# Patient Record
Sex: FEMALE | Race: WHITE | NOT HISPANIC OR LATINO | Employment: FULL TIME | URBAN - NONMETROPOLITAN AREA
[De-identification: names, ages, dates, MRNs, and addresses within clinical notes are randomized per-mention and may not be internally consistent; named-entity substitution may affect disease eponyms.]

---

## 2017-01-06 RX ORDER — WARFARIN SODIUM 2.5 MG/1
2.5 TABLET ORAL DAILY
Qty: 90 TABLET | Refills: 1 | Status: SHIPPED | OUTPATIENT
Start: 2017-01-06 | End: 2017-06-01 | Stop reason: SDUPTHER

## 2017-01-06 RX ORDER — WARFARIN SODIUM 5 MG/1
5 TABLET ORAL
Qty: 90 TABLET | Refills: 1 | Status: SHIPPED | OUTPATIENT
Start: 2017-01-06 | End: 2017-04-18 | Stop reason: SDUPTHER

## 2017-01-09 ENCOUNTER — LAB (OUTPATIENT)
Dept: LAB | Facility: HOSPITAL | Age: 32
End: 2017-01-09

## 2017-01-09 ENCOUNTER — ANTICOAGULATION VISIT (OUTPATIENT)
Dept: CARDIOLOGY | Facility: CLINIC | Age: 32
End: 2017-01-09

## 2017-01-09 ENCOUNTER — TRANSCRIBE ORDERS (OUTPATIENT)
Dept: ADMINISTRATIVE | Facility: HOSPITAL | Age: 32
End: 2017-01-09

## 2017-01-09 DIAGNOSIS — Z79.01 LONG TERM (CURRENT) USE OF ANTICOAGULANTS: Primary | ICD-10-CM

## 2017-01-09 DIAGNOSIS — Z79.01 LONG TERM (CURRENT) USE OF ANTICOAGULANTS: ICD-10-CM

## 2017-01-09 LAB
INR PPP: 1.32 (ref 0.8–1.1)
PROTHROMBIN TIME: 15 SECONDS (ref 9.8–11.9)

## 2017-01-09 PROCEDURE — 85610 PROTHROMBIN TIME: CPT | Performed by: INTERNAL MEDICINE

## 2017-01-09 PROCEDURE — 36415 COLL VENOUS BLD VENIPUNCTURE: CPT

## 2017-01-09 NOTE — PATIENT INSTRUCTIONS
Spoke with pt, she has been eating more vegetables including broccoli and asparagus. She will take a one time dose of 12.5 mg tonight only then resume usual dose. Recheck in 1 week. ALVA

## 2017-01-09 NOTE — PROGRESS NOTES
I have reviewed the anticoagulation track calender, labs, and dosage adjustments made by Chantale Conde and I agree.    Electronically signed by ENMANUEL Finley 01/09/17 6:06 PM

## 2017-01-16 ENCOUNTER — LAB (OUTPATIENT)
Dept: LAB | Facility: HOSPITAL | Age: 32
End: 2017-01-16

## 2017-01-16 ENCOUNTER — TRANSCRIBE ORDERS (OUTPATIENT)
Dept: ADMINISTRATIVE | Facility: HOSPITAL | Age: 32
End: 2017-01-16

## 2017-01-16 ENCOUNTER — ANTICOAGULATION VISIT (OUTPATIENT)
Dept: CARDIOLOGY | Facility: CLINIC | Age: 32
End: 2017-01-16

## 2017-01-16 DIAGNOSIS — Z79.01 LONG TERM (CURRENT) USE OF ANTICOAGULANTS: ICD-10-CM

## 2017-01-16 DIAGNOSIS — Z79.01 LONG TERM (CURRENT) USE OF ANTICOAGULANTS: Primary | ICD-10-CM

## 2017-01-16 LAB
INR PPP: 2.18
INR PPP: 2.18 (ref 0.8–1.1)
PROTHROMBIN TIME: 24.8 SECONDS (ref 9.8–11.9)

## 2017-01-16 PROCEDURE — 36415 COLL VENOUS BLD VENIPUNCTURE: CPT

## 2017-01-16 PROCEDURE — 85610 PROTHROMBIN TIME: CPT | Performed by: INTERNAL MEDICINE

## 2017-01-16 NOTE — PROGRESS NOTES
I have reviewed the anticoagulation track calender, labs, and dosage adjustments made by Amber Hess and I agree.    Electronically signed by ENMANUEL Finley 01/16/17 4:15 PM

## 2017-01-16 NOTE — PATIENT INSTRUCTIONS
Verified dose with PT- last week she had eaten a lot of green vegetables- instructed in new dose- recheck 1 week- LC

## 2017-01-24 ENCOUNTER — TRANSCRIBE ORDERS (OUTPATIENT)
Dept: ADMINISTRATIVE | Facility: HOSPITAL | Age: 32
End: 2017-01-24

## 2017-01-24 ENCOUNTER — LAB (OUTPATIENT)
Dept: LAB | Facility: HOSPITAL | Age: 32
End: 2017-01-24

## 2017-01-24 ENCOUNTER — ANTICOAGULATION VISIT (OUTPATIENT)
Dept: CARDIOLOGY | Facility: CLINIC | Age: 32
End: 2017-01-24

## 2017-01-24 DIAGNOSIS — Z79.01 LONG TERM (CURRENT) USE OF ANTICOAGULANTS: ICD-10-CM

## 2017-01-24 DIAGNOSIS — Z79.01 LONG TERM (CURRENT) USE OF ANTICOAGULANTS: Primary | ICD-10-CM

## 2017-01-24 LAB
INR PPP: 2.12 (ref 0.8–1.1)
PROTHROMBIN TIME: 24.1 SECONDS (ref 9.8–11.9)

## 2017-01-24 PROCEDURE — 36415 COLL VENOUS BLD VENIPUNCTURE: CPT

## 2017-01-24 PROCEDURE — 85610 PROTHROMBIN TIME: CPT | Performed by: INTERNAL MEDICINE

## 2017-01-24 NOTE — PATIENT INSTRUCTIONS
LVM for pt advising to continue current dose and recheck in 2 weeks.  Phone number left should she have any questions or concerns.

## 2017-01-30 ENCOUNTER — LAB (OUTPATIENT)
Dept: LAB | Facility: HOSPITAL | Age: 32
End: 2017-01-30

## 2017-01-30 ENCOUNTER — ANTICOAGULATION VISIT (OUTPATIENT)
Dept: CARDIOLOGY | Facility: CLINIC | Age: 32
End: 2017-01-30

## 2017-01-30 ENCOUNTER — TRANSCRIBE ORDERS (OUTPATIENT)
Dept: ADMINISTRATIVE | Facility: HOSPITAL | Age: 32
End: 2017-01-30

## 2017-01-30 DIAGNOSIS — Z79.01 LONG TERM (CURRENT) USE OF ANTICOAGULANTS: ICD-10-CM

## 2017-01-30 DIAGNOSIS — Z79.01 LONG TERM (CURRENT) USE OF ANTICOAGULANTS: Primary | ICD-10-CM

## 2017-01-30 LAB
INR PPP: 1.62 (ref 0.8–1.1)
PROTHROMBIN TIME: 18.4 SECONDS (ref 9.8–11.9)

## 2017-01-30 PROCEDURE — 36415 COLL VENOUS BLD VENIPUNCTURE: CPT

## 2017-01-30 PROCEDURE — 85610 PROTHROMBIN TIME: CPT | Performed by: INTERNAL MEDICINE

## 2017-02-06 ENCOUNTER — LAB (OUTPATIENT)
Dept: LAB | Facility: HOSPITAL | Age: 32
End: 2017-02-06

## 2017-02-06 ENCOUNTER — TRANSCRIBE ORDERS (OUTPATIENT)
Dept: CARDIOLOGY | Facility: CLINIC | Age: 32
End: 2017-02-06

## 2017-02-06 DIAGNOSIS — Z79.01 LONG TERM (CURRENT) USE OF ANTICOAGULANTS: Primary | ICD-10-CM

## 2017-02-06 DIAGNOSIS — Z79.01 LONG TERM (CURRENT) USE OF ANTICOAGULANTS: ICD-10-CM

## 2017-02-06 LAB
INR PPP: 1.46 (ref 0.8–1.1)
PROTHROMBIN TIME: 16.6 SECONDS (ref 9.8–11.9)

## 2017-02-06 PROCEDURE — 85610 PROTHROMBIN TIME: CPT | Performed by: INTERNAL MEDICINE

## 2017-02-06 PROCEDURE — 36415 COLL VENOUS BLD VENIPUNCTURE: CPT

## 2017-02-07 ENCOUNTER — ANTICOAGULATION VISIT (OUTPATIENT)
Dept: CARDIOLOGY | Facility: CLINIC | Age: 32
End: 2017-02-07

## 2017-02-07 LAB
INR PPP: 1.46
INR PPP: 1.46

## 2017-02-21 ENCOUNTER — ANTICOAGULATION VISIT (OUTPATIENT)
Dept: CARDIOLOGY | Facility: CLINIC | Age: 32
End: 2017-02-21

## 2017-02-21 ENCOUNTER — TRANSCRIBE ORDERS (OUTPATIENT)
Dept: ADMINISTRATIVE | Facility: HOSPITAL | Age: 32
End: 2017-02-21

## 2017-02-21 ENCOUNTER — LAB (OUTPATIENT)
Dept: LAB | Facility: HOSPITAL | Age: 32
End: 2017-02-21

## 2017-02-21 DIAGNOSIS — Z79.01 LONG TERM (CURRENT) USE OF ANTICOAGULANTS: ICD-10-CM

## 2017-02-21 DIAGNOSIS — Z79.01 LONG TERM (CURRENT) USE OF ANTICOAGULANTS: Primary | ICD-10-CM

## 2017-02-21 LAB
INR PPP: 1.73 (ref 0.8–1.1)
PROTHROMBIN TIME: 19.7 SECONDS (ref 9.8–11.9)

## 2017-02-21 PROCEDURE — 85610 PROTHROMBIN TIME: CPT | Performed by: INTERNAL MEDICINE

## 2017-02-21 PROCEDURE — 36415 COLL VENOUS BLD VENIPUNCTURE: CPT

## 2017-03-06 ENCOUNTER — LAB (OUTPATIENT)
Dept: LAB | Facility: HOSPITAL | Age: 32
End: 2017-03-06

## 2017-03-06 ENCOUNTER — ANTICOAGULATION VISIT (OUTPATIENT)
Dept: CARDIOLOGY | Facility: CLINIC | Age: 32
End: 2017-03-06

## 2017-03-06 ENCOUNTER — TRANSCRIBE ORDERS (OUTPATIENT)
Dept: ADMINISTRATIVE | Facility: HOSPITAL | Age: 32
End: 2017-03-06

## 2017-03-06 DIAGNOSIS — Z79.01 LONG TERM (CURRENT) USE OF ANTICOAGULANTS: Primary | ICD-10-CM

## 2017-03-06 DIAGNOSIS — Z79.01 LONG TERM (CURRENT) USE OF ANTICOAGULANTS: ICD-10-CM

## 2017-03-06 LAB
INR PPP: 1.6
INR PPP: 1.6 (ref 0.8–1.1)
PROTHROMBIN TIME: 18.3 SECONDS (ref 9.8–11.9)

## 2017-03-06 PROCEDURE — 36415 COLL VENOUS BLD VENIPUNCTURE: CPT

## 2017-03-06 PROCEDURE — 85610 PROTHROMBIN TIME: CPT | Performed by: INTERNAL MEDICINE

## 2017-03-10 ENCOUNTER — ANTICOAGULATION VISIT (OUTPATIENT)
Dept: CARDIOLOGY | Facility: CLINIC | Age: 32
End: 2017-03-10

## 2017-03-10 ENCOUNTER — LAB (OUTPATIENT)
Dept: LAB | Facility: HOSPITAL | Age: 32
End: 2017-03-10

## 2017-03-10 ENCOUNTER — TRANSCRIBE ORDERS (OUTPATIENT)
Dept: ADMINISTRATIVE | Facility: HOSPITAL | Age: 32
End: 2017-03-10

## 2017-03-10 DIAGNOSIS — Z79.01 LONG TERM (CURRENT) USE OF ANTICOAGULANTS: Primary | ICD-10-CM

## 2017-03-10 DIAGNOSIS — Z79.01 LONG TERM (CURRENT) USE OF ANTICOAGULANTS: ICD-10-CM

## 2017-03-10 LAB
INR PPP: 1.98 (ref 0.8–1.1)
INR PPP: 3.2
PROTHROMBIN TIME: 22.9 SECONDS (ref 9.8–11.9)

## 2017-03-10 PROCEDURE — 85610 PROTHROMBIN TIME: CPT | Performed by: INTERNAL MEDICINE

## 2017-03-10 PROCEDURE — 36415 COLL VENOUS BLD VENIPUNCTURE: CPT

## 2017-03-10 NOTE — PROGRESS NOTES
I have reviewed the anticoagulation track calender, labs, and dosage adjustments made by Cori Gonsalves RN and I agree.    Electronically signed by ENMANUEL Finley 03/10/17 2:01 PM

## 2017-03-14 ENCOUNTER — LAB (OUTPATIENT)
Dept: LAB | Facility: HOSPITAL | Age: 32
End: 2017-03-14

## 2017-03-14 ENCOUNTER — TRANSCRIBE ORDERS (OUTPATIENT)
Dept: ADMINISTRATIVE | Facility: HOSPITAL | Age: 32
End: 2017-03-14

## 2017-03-14 DIAGNOSIS — Z79.01 LONG TERM (CURRENT) USE OF ANTICOAGULANTS: Primary | ICD-10-CM

## 2017-03-14 DIAGNOSIS — Z79.01 LONG TERM (CURRENT) USE OF ANTICOAGULANTS: ICD-10-CM

## 2017-03-14 LAB
INR PPP: 2.06 (ref 0.8–1.1)
PROTHROMBIN TIME: 23.9 SECONDS (ref 9.8–11.9)

## 2017-03-14 PROCEDURE — 36415 COLL VENOUS BLD VENIPUNCTURE: CPT

## 2017-03-14 PROCEDURE — 85610 PROTHROMBIN TIME: CPT | Performed by: INTERNAL MEDICINE

## 2017-03-15 ENCOUNTER — ANTICOAGULATION VISIT (OUTPATIENT)
Dept: CARDIOLOGY | Facility: CLINIC | Age: 32
End: 2017-03-15

## 2017-03-21 ENCOUNTER — TRANSCRIBE ORDERS (OUTPATIENT)
Dept: ADMINISTRATIVE | Facility: HOSPITAL | Age: 32
End: 2017-03-21

## 2017-03-21 ENCOUNTER — LAB (OUTPATIENT)
Dept: LAB | Facility: HOSPITAL | Age: 32
End: 2017-03-21

## 2017-03-21 ENCOUNTER — ANTICOAGULATION VISIT (OUTPATIENT)
Dept: CARDIOLOGY | Facility: CLINIC | Age: 32
End: 2017-03-21

## 2017-03-21 DIAGNOSIS — Z79.01 LONG TERM (CURRENT) USE OF ANTICOAGULANTS: Primary | ICD-10-CM

## 2017-03-21 DIAGNOSIS — Z79.01 LONG TERM (CURRENT) USE OF ANTICOAGULANTS: ICD-10-CM

## 2017-03-21 LAB
INR PPP: 1.08 (ref 0.8–1.1)
PROTHROMBIN TIME: 12.1 SECONDS (ref 9.8–11.9)

## 2017-03-21 PROCEDURE — 85610 PROTHROMBIN TIME: CPT | Performed by: INTERNAL MEDICINE

## 2017-03-21 PROCEDURE — 36415 COLL VENOUS BLD VENIPUNCTURE: CPT

## 2017-03-21 NOTE — PATIENT INSTRUCTIONS
Patient say that she had not missed any doses or new medicine.  Gave her a new dosage and to recheck 1 week  AH

## 2017-03-24 NOTE — PROGRESS NOTES
I have reviewed the anticoagulation track calender, labs, and dosage adjustments made by Cori Gonsalves RN and I agree.    Electronically signed by ENMANUEL Finley 03/24/17 4:29 PM

## 2017-03-28 ENCOUNTER — LAB (OUTPATIENT)
Dept: LAB | Facility: HOSPITAL | Age: 32
End: 2017-03-28

## 2017-03-28 ENCOUNTER — ANTICOAGULATION VISIT (OUTPATIENT)
Dept: CARDIOLOGY | Facility: CLINIC | Age: 32
End: 2017-03-28

## 2017-03-28 ENCOUNTER — TRANSCRIBE ORDERS (OUTPATIENT)
Dept: ADMINISTRATIVE | Facility: HOSPITAL | Age: 32
End: 2017-03-28

## 2017-03-28 DIAGNOSIS — Z79.01 LONG TERM (CURRENT) USE OF ANTICOAGULANTS: Primary | ICD-10-CM

## 2017-03-28 DIAGNOSIS — Z79.01 LONG TERM (CURRENT) USE OF ANTICOAGULANTS: ICD-10-CM

## 2017-03-28 LAB
INR PPP: 2.15 (ref 0.8–1.1)
PROTHROMBIN TIME: 25 SECONDS (ref 9.8–11.9)

## 2017-03-28 PROCEDURE — 85610 PROTHROMBIN TIME: CPT | Performed by: INTERNAL MEDICINE

## 2017-03-28 PROCEDURE — 36415 COLL VENOUS BLD VENIPUNCTURE: CPT

## 2017-04-05 ENCOUNTER — TRANSCRIBE ORDERS (OUTPATIENT)
Dept: ADMINISTRATIVE | Facility: HOSPITAL | Age: 32
End: 2017-04-05

## 2017-04-05 ENCOUNTER — LAB (OUTPATIENT)
Dept: LAB | Facility: HOSPITAL | Age: 32
End: 2017-04-05

## 2017-04-05 ENCOUNTER — ANTICOAGULATION VISIT (OUTPATIENT)
Dept: CARDIOLOGY | Facility: CLINIC | Age: 32
End: 2017-04-05

## 2017-04-05 DIAGNOSIS — Z79.01 LONG TERM (CURRENT) USE OF ANTICOAGULANTS: Primary | ICD-10-CM

## 2017-04-05 DIAGNOSIS — Z79.01 LONG TERM (CURRENT) USE OF ANTICOAGULANTS: ICD-10-CM

## 2017-04-05 LAB
INR PPP: 1.3 (ref 0.8–1.1)
PROTHROMBIN TIME: 14.7 SECONDS (ref 9.8–11.9)

## 2017-04-05 PROCEDURE — 85610 PROTHROMBIN TIME: CPT | Performed by: INTERNAL MEDICINE

## 2017-04-05 PROCEDURE — 36415 COLL VENOUS BLD VENIPUNCTURE: CPT

## 2017-04-06 NOTE — PROGRESS NOTES
I have reviewed the anticoagulation track calender, labs, and dosage adjustments made by Cori Gonsalves RN and I agree.    Electronically signed by ENMANUEL Finley 04/06/17 12:36 PM

## 2017-04-12 ENCOUNTER — TRANSCRIBE ORDERS (OUTPATIENT)
Dept: ADMINISTRATIVE | Facility: HOSPITAL | Age: 32
End: 2017-04-12

## 2017-04-12 ENCOUNTER — ANTICOAGULATION VISIT (OUTPATIENT)
Dept: CARDIOLOGY | Facility: CLINIC | Age: 32
End: 2017-04-12

## 2017-04-12 ENCOUNTER — LAB (OUTPATIENT)
Dept: LAB | Facility: HOSPITAL | Age: 32
End: 2017-04-12

## 2017-04-12 DIAGNOSIS — Z79.01 LONG TERM (CURRENT) USE OF ANTICOAGULANTS: ICD-10-CM

## 2017-04-12 DIAGNOSIS — Z79.01 LONG TERM (CURRENT) USE OF ANTICOAGULANTS: Primary | ICD-10-CM

## 2017-04-12 DIAGNOSIS — Z95.2 S/P AVR (AORTIC VALVE REPLACEMENT): Primary | ICD-10-CM

## 2017-04-12 LAB
INR PPP: 1.53 (ref 0.8–1.1)
PROTHROMBIN TIME: 17.4 SECONDS (ref 9.8–11.9)

## 2017-04-12 PROCEDURE — 36415 COLL VENOUS BLD VENIPUNCTURE: CPT

## 2017-04-12 PROCEDURE — 85610 PROTHROMBIN TIME: CPT

## 2017-04-18 RX ORDER — WARFARIN SODIUM 5 MG/1
5 TABLET ORAL
Qty: 90 TABLET | Refills: 1 | Status: SHIPPED | OUTPATIENT
Start: 2017-04-18 | End: 2017-04-28 | Stop reason: SDUPTHER

## 2017-04-24 ENCOUNTER — LAB (OUTPATIENT)
Dept: LAB | Facility: HOSPITAL | Age: 32
End: 2017-04-24

## 2017-04-24 ENCOUNTER — ANTICOAGULATION VISIT (OUTPATIENT)
Dept: CARDIOLOGY | Facility: CLINIC | Age: 32
End: 2017-04-24

## 2017-04-24 DIAGNOSIS — Z95.2 S/P AVR (AORTIC VALVE REPLACEMENT): ICD-10-CM

## 2017-04-24 LAB
INR PPP: 1.75 (ref 0.8–1.1)
PROTHROMBIN TIME: 20.1 SECONDS (ref 9.8–11.9)

## 2017-04-24 PROCEDURE — 36415 COLL VENOUS BLD VENIPUNCTURE: CPT

## 2017-04-24 PROCEDURE — 85610 PROTHROMBIN TIME: CPT | Performed by: INTERNAL MEDICINE

## 2017-04-28 RX ORDER — WARFARIN SODIUM 5 MG/1
5 TABLET ORAL
Qty: 90 TABLET | Refills: 1 | Status: SHIPPED | OUTPATIENT
Start: 2017-04-28 | End: 2017-06-01 | Stop reason: SDUPTHER

## 2017-05-08 ENCOUNTER — OFFICE VISIT (OUTPATIENT)
Dept: CARDIOLOGY | Facility: CLINIC | Age: 32
End: 2017-05-08

## 2017-05-08 VITALS
HEIGHT: 62 IN | SYSTOLIC BLOOD PRESSURE: 128 MMHG | BODY MASS INDEX: 31.1 KG/M2 | OXYGEN SATURATION: 99 % | WEIGHT: 169 LBS | HEART RATE: 62 BPM | DIASTOLIC BLOOD PRESSURE: 86 MMHG

## 2017-05-08 DIAGNOSIS — I35.0 AORTIC VALVE STENOSIS, UNSPECIFIED ETIOLOGY: Primary | ICD-10-CM

## 2017-05-08 PROCEDURE — 99213 OFFICE O/P EST LOW 20 MIN: CPT | Performed by: INTERNAL MEDICINE

## 2017-05-15 ENCOUNTER — ANTICOAGULATION VISIT (OUTPATIENT)
Dept: CARDIOLOGY | Facility: CLINIC | Age: 32
End: 2017-05-15

## 2017-05-15 ENCOUNTER — LAB (OUTPATIENT)
Dept: LAB | Facility: HOSPITAL | Age: 32
End: 2017-05-15

## 2017-05-15 DIAGNOSIS — Z95.2 S/P AVR (AORTIC VALVE REPLACEMENT): ICD-10-CM

## 2017-05-15 LAB
INR PPP: 1.91 (ref 0.8–1.1)
PROTHROMBIN TIME: 22.1 SECONDS (ref 9.8–11.9)

## 2017-05-15 PROCEDURE — 85610 PROTHROMBIN TIME: CPT | Performed by: INTERNAL MEDICINE

## 2017-05-15 PROCEDURE — 36415 COLL VENOUS BLD VENIPUNCTURE: CPT

## 2017-06-01 RX ORDER — WARFARIN SODIUM 5 MG/1
5 TABLET ORAL
Qty: 120 TABLET | Refills: 0 | Status: SHIPPED | OUTPATIENT
Start: 2017-06-01 | End: 2018-04-24 | Stop reason: SDUPTHER

## 2017-06-01 RX ORDER — WARFARIN SODIUM 2.5 MG/1
2.5 TABLET ORAL DAILY
Qty: 120 TABLET | Refills: 0 | Status: SHIPPED | OUTPATIENT
Start: 2017-06-01 | End: 2017-11-16 | Stop reason: SDUPTHER

## 2017-06-06 ENCOUNTER — LAB (OUTPATIENT)
Dept: LAB | Facility: HOSPITAL | Age: 32
End: 2017-06-06

## 2017-06-06 DIAGNOSIS — Z95.2 S/P AVR (AORTIC VALVE REPLACEMENT): ICD-10-CM

## 2017-06-06 LAB
INR PPP: 2.86 (ref 0.8–1.1)
PROTHROMBIN TIME: 34 SECONDS (ref 9.8–11.9)

## 2017-06-06 PROCEDURE — 36415 COLL VENOUS BLD VENIPUNCTURE: CPT

## 2017-06-06 PROCEDURE — 85610 PROTHROMBIN TIME: CPT | Performed by: INTERNAL MEDICINE

## 2017-06-07 ENCOUNTER — ANTICOAGULATION VISIT (OUTPATIENT)
Dept: CARDIOLOGY | Facility: CLINIC | Age: 32
End: 2017-06-07

## 2017-06-09 NOTE — PROGRESS NOTES
I have reviewed the anticoagulation track calender, labs, and dosage adjustments made by Cori Gonsalves RN and I agree.    Electronically signed by ENMANUEL Lees 06/09/17 12:02 PM

## 2017-06-30 ENCOUNTER — LAB (OUTPATIENT)
Dept: LAB | Facility: HOSPITAL | Age: 32
End: 2017-06-30

## 2017-06-30 ENCOUNTER — ANTICOAGULATION VISIT (OUTPATIENT)
Dept: CARDIOLOGY | Facility: CLINIC | Age: 32
End: 2017-06-30

## 2017-06-30 DIAGNOSIS — Z95.2 S/P AVR (AORTIC VALVE REPLACEMENT): ICD-10-CM

## 2017-06-30 LAB
INR PPP: 2.14 (ref 0.9–1.1)
PROTHROMBIN TIME: 24.2 SECONDS (ref 11–15.4)

## 2017-06-30 PROCEDURE — 85610 PROTHROMBIN TIME: CPT | Performed by: INTERNAL MEDICINE

## 2017-06-30 PROCEDURE — 36415 COLL VENOUS BLD VENIPUNCTURE: CPT

## 2017-08-03 ENCOUNTER — ANTICOAGULATION VISIT (OUTPATIENT)
Dept: CARDIOLOGY | Facility: CLINIC | Age: 32
End: 2017-08-03

## 2017-08-03 ENCOUNTER — LAB (OUTPATIENT)
Dept: LAB | Facility: HOSPITAL | Age: 32
End: 2017-08-03

## 2017-08-03 DIAGNOSIS — Z95.2 S/P AVR (AORTIC VALVE REPLACEMENT): ICD-10-CM

## 2017-08-03 LAB
INR PPP: 3.19 (ref 0.9–1.1)
PROTHROMBIN TIME: 33.1 SECONDS (ref 11–15.4)

## 2017-08-03 PROCEDURE — 85610 PROTHROMBIN TIME: CPT | Performed by: INTERNAL MEDICINE

## 2017-08-03 PROCEDURE — 36415 COLL VENOUS BLD VENIPUNCTURE: CPT

## 2017-08-04 NOTE — PROGRESS NOTES
I have reviewed the anticoagulation track calender, labs, and dosage adjustments made by Cori Gonsalves RN and I agree.    Electronically signed by ENMANUEL Lees 08/04/17 12:31 PM

## 2017-08-16 ENCOUNTER — LAB (OUTPATIENT)
Dept: LAB | Facility: HOSPITAL | Age: 32
End: 2017-08-16

## 2017-08-16 ENCOUNTER — ANTICOAGULATION VISIT (OUTPATIENT)
Dept: CARDIOLOGY | Facility: CLINIC | Age: 32
End: 2017-08-16

## 2017-08-16 DIAGNOSIS — Z95.2 S/P AVR (AORTIC VALVE REPLACEMENT): ICD-10-CM

## 2017-08-16 LAB
INR PPP: 2.76 (ref 0.9–1.1)
PROTHROMBIN TIME: 29.6 SECONDS (ref 11–15.4)

## 2017-08-16 PROCEDURE — 85610 PROTHROMBIN TIME: CPT | Performed by: INTERNAL MEDICINE

## 2017-08-16 PROCEDURE — 36415 COLL VENOUS BLD VENIPUNCTURE: CPT

## 2017-08-25 ENCOUNTER — LAB (OUTPATIENT)
Dept: LAB | Facility: HOSPITAL | Age: 32
End: 2017-08-25

## 2017-08-25 ENCOUNTER — ANTICOAGULATION VISIT (OUTPATIENT)
Dept: CARDIOLOGY | Facility: CLINIC | Age: 32
End: 2017-08-25

## 2017-08-25 ENCOUNTER — TRANSCRIBE ORDERS (OUTPATIENT)
Dept: CARDIOLOGY | Facility: CLINIC | Age: 32
End: 2017-08-25

## 2017-08-25 DIAGNOSIS — Z79.01 ANTICOAGULANT LONG-TERM USE: ICD-10-CM

## 2017-08-25 DIAGNOSIS — Z79.01 ANTICOAGULANT LONG-TERM USE: Primary | ICD-10-CM

## 2017-08-25 LAB
INR PPP: 2.77 (ref 0.9–1.1)
PROTHROMBIN TIME: 29.7 SECONDS (ref 11–15.4)

## 2017-08-25 PROCEDURE — 85610 PROTHROMBIN TIME: CPT | Performed by: INTERNAL MEDICINE

## 2017-08-25 PROCEDURE — 36415 COLL VENOUS BLD VENIPUNCTURE: CPT

## 2017-09-06 ENCOUNTER — ANTICOAGULATION VISIT (OUTPATIENT)
Dept: PHARMACY | Facility: HOSPITAL | Age: 32
End: 2017-09-06

## 2017-09-06 ENCOUNTER — LAB (OUTPATIENT)
Dept: LAB | Facility: HOSPITAL | Age: 32
End: 2017-09-06

## 2017-09-06 DIAGNOSIS — Z95.2 HX OF AORTIC VALVE REPLACEMENT, MECHANICAL: ICD-10-CM

## 2017-09-06 DIAGNOSIS — Z95.2 S/P AVR (AORTIC VALVE REPLACEMENT): ICD-10-CM

## 2017-09-06 LAB
INR PPP: 2.7 (ref 0.9–1.1)
PROTHROMBIN TIME: 29.1 SECONDS (ref 11–15.4)

## 2017-09-06 PROCEDURE — 85610 PROTHROMBIN TIME: CPT | Performed by: INTERNAL MEDICINE

## 2017-09-06 PROCEDURE — 36415 COLL VENOUS BLD VENIPUNCTURE: CPT

## 2017-09-06 RX ORDER — LORATADINE 10 MG/1
10 CAPSULE, LIQUID FILLED ORAL DAILY PRN
COMMUNITY
End: 2017-11-13

## 2017-09-06 NOTE — PROGRESS NOTES
Anticoagulation Clinic - Remote Progress Note  REMOTE LAB    Indication: mechanical AVR (On-X)  Referring Provider: Latoya  Initial Warfarin Start Date:   Goal INR: 1.5-2.5  Current Drug Interactions: aspirin    Diet: one serving of something green once weekly (salads with raw spinach, asparagus -- avoids broccoli, etc)  Alcohol:   Tobacco:   OTC Pain Medication:    INR History:  Date 8/16 8/25 9/6         Total Weekly Dose 57.5 mg 57.5 mg 52.5 mg         INR 2.76 2.77 2.70         Notes   fewer GLV           Phone Interview:  Tablet Strength: pt has 5mg tablets  Current Maintenance Dose: 7.5mg daily  Patient Findings      Positives Change in medications, Change in diet/appetite     Negatives Signs/symptoms of thrombosis, Signs/symptoms of bleeding, Laboratory test error suspected, Change in health, Change in alcohol use, Change in activity, Upcoming invasive procedure, Emergency department visit, Upcoming dental procedure, Missed doses, Extra doses, Hospital admission, Bruising, Other complaints     Comments Pt has been using daily loratadine until just recently -- she otherwise denies recent changes with her medications. She has been very busy recently and has not been cooking as much, so her GLV intake has been lower than usual. She prefers salads and asparagus and avoids anything of higher vitamin K content (broccoli, brussels sprouts, cooked greens, etc.)       Patient Contact Info: [phone]   Lab Contact Info: [facility]    Plan:  1. INR is slightly supratherapeutic again today despite recent dose decrease. Instructed pt to incorporate 2 salads into her diet each week (raw spinach), and continue warfarin 7.5mg daily for now.   2. Repeat INR next Friday 9/15.  3. Verbal information provided over the phone to Mrs. Queen. She RBV dosing instructions, expresses understanding, and has no further questions at this time.    Grecia Segovia [facility]  9/6/2017  4:49 PM

## 2017-09-15 ENCOUNTER — LAB (OUTPATIENT)
Dept: LAB | Facility: HOSPITAL | Age: 32
End: 2017-09-15

## 2017-09-15 ENCOUNTER — ANTICOAGULATION VISIT (OUTPATIENT)
Dept: PHARMACY | Facility: HOSPITAL | Age: 32
End: 2017-09-15

## 2017-09-15 DIAGNOSIS — Z95.2 HX OF AORTIC VALVE REPLACEMENT, MECHANICAL: ICD-10-CM

## 2017-09-15 DIAGNOSIS — Z95.2 S/P AVR (AORTIC VALVE REPLACEMENT): ICD-10-CM

## 2017-09-15 LAB
INR PPP: 1.71 (ref 0.9–1.1)
PROTHROMBIN TIME: 20.3 SECONDS (ref 11–15.4)

## 2017-09-15 PROCEDURE — 85610 PROTHROMBIN TIME: CPT | Performed by: INTERNAL MEDICINE

## 2017-09-15 PROCEDURE — 36415 COLL VENOUS BLD VENIPUNCTURE: CPT

## 2017-09-15 NOTE — PROGRESS NOTES
Anticoagulation Clinic - Remote Progress Note  REMOTE LAB    Indication: mechanical AVR (On-X)  Referring Provider: Latoya  Initial Warfarin Start Date:   Goal INR: 1.5-2.5  Current Drug Interactions: aspirin    Diet: one serving of something green once weekly (salads with raw spinach, asparagus -- avoids broccoli, etc)  Alcohol:   Tobacco:   OTC Pain Medication:    INR History:  Date 8/16 8/25 9/6 9/15        Total Weekly Dose 57.5 mg 57.5 mg 52.5 mg 37.5-45 mg        INR 2.76 2.77 2.70 1.7        Notes   fewer GLV miss dose x 2?          Phone Interview:  Tablet Strength: pt has 5mg tablets  Current Maintenance Dose: 7.5mg daily    Patient Findings      Positives Missed doses     Negatives Signs/symptoms of thrombosis, Signs/symptoms of bleeding, Laboratory test error suspected, Change in health, Change in alcohol use, Change in activity, Upcoming invasive procedure, Emergency department visit, Upcoming dental procedure, Extra doses, Change in medications, Change in diet/appetite, Hospital admission, Bruising, Other complaints     Comments She believes she missed two doses earlier in week of last week.       Patient Contact Info: [phone]   Lab Contact Info: [facility]    Plan:  1. INR is therapeutic today (1.7). Goal range 1.5-2.5  Instructed pt to incorporate 2 salads into her diet each week (raw spinach), and continue warfarin 7.5mg daily for now.   2. Repeat INR 2 weeks Friday 9/29. Although she is therapeutic today at 1.7, she believes she missed two doses last week. She says at one time she was on 10 mg daily and has been steadily requiring less warfarin, would like to get back to 4 week checks--as long as she..maintains goal range.  3. Verbal information provided over the phone to Mrs. Queen. She RBV dosing instructions, expresses understanding, and has no further questions at this time.    Genaro Chu formerly [facility]  9/15/2017  2:51 PM

## 2017-10-02 ENCOUNTER — ANTICOAGULATION VISIT (OUTPATIENT)
Dept: PHARMACY | Facility: HOSPITAL | Age: 32
End: 2017-10-02

## 2017-10-02 ENCOUNTER — LAB (OUTPATIENT)
Dept: LAB | Facility: HOSPITAL | Age: 32
End: 2017-10-02

## 2017-10-02 DIAGNOSIS — Z95.2 HX OF AORTIC VALVE REPLACEMENT, MECHANICAL: ICD-10-CM

## 2017-10-02 DIAGNOSIS — Z95.2 S/P AVR (AORTIC VALVE REPLACEMENT): ICD-10-CM

## 2017-10-02 LAB
INR PPP: 1.55 (ref 0.9–1.1)
PROTHROMBIN TIME: 18.7 SECONDS (ref 11–15.4)

## 2017-10-02 PROCEDURE — 36415 COLL VENOUS BLD VENIPUNCTURE: CPT

## 2017-10-02 PROCEDURE — 85610 PROTHROMBIN TIME: CPT | Performed by: INTERNAL MEDICINE

## 2017-10-02 NOTE — PROGRESS NOTES
Anticoagulation Clinic - Remote Progress Note  REMOTE LAB    Indication: mechanical AVR (On-X)  Referring Provider: Latoya  Initial Warfarin Start Date:   Goal INR: 1.5-2.5  Current Drug Interactions: aspirin    Diet: one serving of something green once weekly (salads with raw spinach, asparagus -- avoids broccoli, etc)  Alcohol: none  Tobacco: none  OTC Pain Medication: APAP PRN    INR History:  Date 8/16 8/25 9/6 9/15 10/2       Total Weekly Dose 57.5 mg 57.5 mg 52.5 mg 37.5-45 mg 52.5 mg 55 mg      INR 2.76 2.77 2.70 1.7 1.55       Notes   fewer GLV miss dose x 2?          Phone Interview:  Tablet Strength: pt has 5mg tablets  Current Maintenance Dose: 7.5mg daily  Patient Findings      Positives Change in diet/appetite     Negatives Signs/symptoms of thrombosis, Signs/symptoms of bleeding, Laboratory test error suspected, Change in health, Change in alcohol use, Change in activity, Upcoming invasive procedure, Emergency department visit, Upcoming dental procedure, Missed doses, Extra doses, Change in medications, Hospital admission, Bruising, Other complaints     Comments Ms. Queen has been eating 2 weekly salads of spring mix lettuce containing raw kale. She otherwise denies changes or complications.     Patient Contact Info: [phone]   Lab Contact Info: [facility]    Plan:  1. INR is therapeutic today at the low end of pt's goal range. Given significant drop with recent dose decrease, instructed Ms. Queen to increase her weekly warfarin dose ~5% to 7.5mg daily except 10mg on Mondays. She will also transition to weekly salads using a new spring mix without raw kale.  2. Repeat INR 2 weeks to assess dose change.  3. Verbal information provided over the phone to Mrs. Queen. She RBV dosing instructions, expresses understanding, and has no further questions at this time.    Grecia Segovia [facility]  10/2/2017  3:09 PM

## 2017-10-16 ENCOUNTER — LAB (OUTPATIENT)
Dept: LAB | Facility: HOSPITAL | Age: 32
End: 2017-10-16

## 2017-10-16 ENCOUNTER — ANTICOAGULATION VISIT (OUTPATIENT)
Dept: PHARMACY | Facility: HOSPITAL | Age: 32
End: 2017-10-16

## 2017-10-16 DIAGNOSIS — Z95.2 HX OF AORTIC VALVE REPLACEMENT, MECHANICAL: ICD-10-CM

## 2017-10-16 DIAGNOSIS — Z95.2 S/P AVR (AORTIC VALVE REPLACEMENT): ICD-10-CM

## 2017-10-16 LAB
INR PPP: 2.04 (ref 0.9–1.1)
PROTHROMBIN TIME: 23.3 SECONDS (ref 11–15.4)

## 2017-10-16 PROCEDURE — 85610 PROTHROMBIN TIME: CPT | Performed by: INTERNAL MEDICINE

## 2017-10-16 PROCEDURE — 36415 COLL VENOUS BLD VENIPUNCTURE: CPT

## 2017-10-16 NOTE — PROGRESS NOTES
Anticoagulation Clinic - Remote Progress Note  REMOTE LAB    Indication: mechanical AVR (On-X)  Referring Provider: Latoya  Initial Warfarin Start Date:   Goal INR: 1.5-2.5  Current Drug Interactions: aspirin    Diet: one serving of something green once weekly (salads with raw spinach, asparagus -- avoids broccoli, etc)  Alcohol: none  Tobacco: none  OTC Pain Medication: APAP PRN    INR History:  Date 8/16 8/25 9/6 9/15 10/2 10/16      Total Weekly Dose 57.5 mg 57.5 mg 52.5 mg 37.5-45 mg 52.5 mg 52.5 mg      INR 2.76 2.77 2.70 1.7 1.55 2.04      Notes   fewer GLV miss dose x 2?  Thrive        Phone Interview:  Tablet Strength: pt has 5mg tablets  Current Maintenance Dose: 7.5mg daily  Patient Findings      Negatives Signs/symptoms of thrombosis, Signs/symptoms of bleeding, Laboratory test error suspected, Change in health, Change in alcohol use, Change in activity, Upcoming invasive procedure, Emergency department visit, Upcoming dental procedure, Missed doses, Extra doses, Change in medications, Change in diet/appetite, Hospital admission, Bruising, Other complaints     Comments Mrs. Queen started a system called THRIVE about two weeks ago (MVI + shake + patch) -- she does not think that the products contain any vitamin K (upon further investigation, the supplement appears to contain vitamin A, vitamin B complex, chromium, selenium, vanadium + strains of lactobacillus + caffeine + glucosamine + bernadette + CoQ10 -- will discuss potential DDIs with warfarin at follow up). She reports that she has not really eaten any salads this past week, but she has had other sources of vitamin K (green beans). She has also cut coffee out of her diet.     Patient Contact Info: [phone]   Lab Contact Info: [facility]    Plan:  1. INR is therapeutic today despite pt varying from suggested dosing regimen. For now, instructed Mrs. Queen to maintain her current vitamin K intake and continue warfarin 7.5mg daily.  2. Repeat INR in 2  weeks.  3. Verbal information provided over the phone to Mrs. Queen. She RBV dosing instructions, expresses understanding, and has no further questions at this time.    Grecia Segovia RPH  10/16/2017  2:36 PM

## 2017-10-30 ENCOUNTER — LAB (OUTPATIENT)
Dept: LAB | Facility: HOSPITAL | Age: 32
End: 2017-10-30

## 2017-10-30 ENCOUNTER — ANTICOAGULATION VISIT (OUTPATIENT)
Dept: PHARMACY | Facility: HOSPITAL | Age: 32
End: 2017-10-30

## 2017-10-30 DIAGNOSIS — Z95.2 S/P AVR (AORTIC VALVE REPLACEMENT): ICD-10-CM

## 2017-10-30 DIAGNOSIS — Z95.2 HX OF AORTIC VALVE REPLACEMENT, MECHANICAL: ICD-10-CM

## 2017-10-30 LAB
INR PPP: 2.93 (ref 0.9–1.1)
PROTHROMBIN TIME: 31 SECONDS (ref 11–15.4)

## 2017-10-30 PROCEDURE — 85610 PROTHROMBIN TIME: CPT | Performed by: INTERNAL MEDICINE

## 2017-10-30 PROCEDURE — 36415 COLL VENOUS BLD VENIPUNCTURE: CPT

## 2017-10-30 NOTE — PROGRESS NOTES
"Anticoagulation Clinic - Remote Progress Note  REMOTE LAB    Indication: mechanical AVR (On-X)  Referring Provider: Latoya  Initial Warfarin Start Date:   Goal INR: 1.5-2.5  Current Drug Interactions: aspirin    Diet: one serving of something green once weekly (salads with raw spinach, asparagus -- avoids broccoli, etc)  Alcohol: none  Tobacco: none  OTC Pain Medication: APAP PRN    INR History:  Date 8/16 8/25 9/6 9/15 10/2 10/16 10/30     Total Weekly Dose 57.5 mg 57.5 mg 52.5 mg 37.5-45 mg 52.5 mg 52.5 mg 52.5 mg     INR 2.76 2.77 2.70 1.7 1.55 2.04 2.93     Notes   fewer GLV miss dose x 2?  Thrive Thrive       Phone Interview:  Tablet Strength: pt has 5mg tablets  Current Maintenance Dose: 7.5mg daily  Patient Findings      Negatives Signs/symptoms of thrombosis, Signs/symptoms of bleeding, Laboratory test error suspected, Change in health, Change in alcohol use, Change in activity, Upcoming invasive procedure, Emergency department visit, Upcoming dental procedure, Missed doses, Extra doses, Change in medications, Change in diet/appetite, Hospital admission, Bruising, Other complaints     Comments Mrs. Queen reports no recent changes or complications. We discussed her THRIVE multivitamin / supplement system and the potential interacting components it contains (including but not limited to: vitamin A, strains of lactobacillus, glucosamine, bernadette, CoQ10). Recommended that she instead resume her prior MVI or look for a [employer] product (USP), keeping in mind that most MVIs contain small amounts of vitamin K.     Patient Contact Info: [phone]   Lab Contact Info: [facility]    Plan:  1. INR is supratherapeutic today, possibly a result of recent initiation of THRIVE MVI system. Discussed this vitamin/supplement at length and the potential DDIs within, especially those in the \"proprietary blend.\" For this reason, Mrs. Queen has decided to stop this system and re-start her previous MVI. She will also take " partial dose warfarin 5mg tonight, but then continue warfarin 7.5mg daily thereafter.  2. Repeat INR in 2 weeks with cardiology follow up / ECHO.  3. Verbal information provided over the phone to Mrs. Queen. She RBV dosing instructions, expresses understanding, and has no further questions at this time.    Grecia Segovia [facility]  10/30/2017  12:20 PM

## 2017-11-13 ENCOUNTER — HOSPITAL ENCOUNTER (OUTPATIENT)
Dept: CARDIOLOGY | Facility: HOSPITAL | Age: 32
Discharge: HOME | End: 2017-11-13
Attending: INTERNAL MEDICINE | Admitting: INTERNAL MEDICINE

## 2017-11-13 ENCOUNTER — OFFICE VISIT (OUTPATIENT)
Dept: CARDIOLOGY | Facility: CLINIC | Age: 32
End: 2017-11-13

## 2017-11-13 VITALS
BODY MASS INDEX: 31.98 KG/M2 | SYSTOLIC BLOOD PRESSURE: 134 MMHG | HEIGHT: 62 IN | WEIGHT: 173.8 LBS | HEART RATE: 68 BPM | DIASTOLIC BLOOD PRESSURE: 88 MMHG

## 2017-11-13 DIAGNOSIS — I35.0 AORTIC VALVE STENOSIS, UNSPECIFIED ETIOLOGY: ICD-10-CM

## 2017-11-13 DIAGNOSIS — I35.0 NONRHEUMATIC AORTIC VALVE STENOSIS: Primary | ICD-10-CM

## 2017-11-13 DIAGNOSIS — R00.2 PALPITATIONS: ICD-10-CM

## 2017-11-13 PROCEDURE — 99213 OFFICE O/P EST LOW 20 MIN: CPT | Performed by: INTERNAL MEDICINE

## 2017-11-13 PROCEDURE — 93306 TTE W/DOPPLER COMPLETE: CPT

## 2017-11-13 PROCEDURE — 93306 TTE W/DOPPLER COMPLETE: CPT | Performed by: INTERNAL MEDICINE

## 2017-11-13 NOTE — PROGRESS NOTES
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          Chief Complaint   Patient presents with   • Follow-up       aortic stenosis, aortic inuffficiency         PROBLEM LIST:   1. Bicuspid aortic valve with combined aortic stenosis/aortic insufficiency.  a. In 2005, remote onset of auscultated murmur by primary care physician with ELVIRA per Dr. Ortiz with bicuspid findings.   b. 9/16 #25 AVR On X Dacron tube  graft & root replacement  w coronary reimplantation-Dr Bazzi         I. periop tamponade requiring window  C. 11/17 echo acceptable AVR , EF 60%  2. Atypical chest pain.  a. On 4/16/2012, GXT stress test revealing 12.8 MET exercise. T-wave inversion in inferior leads and in leads 4 - leads 6 and no exercise induced arrhythmias.  3. Palpitations.   a. On 4/17/2012, 24-hour Holter monitor revealing heart rate ranging from  beats per minute with 56 PVCs, no pauses.  4. Childhood asthma.  5. Surgical history: None.  6. G1, P2, currently 27 weeks as of 07/16/2014.      Chief Complaint   Patient presents with   • Shortness of Breath   • Fatigue           Allergies  Allergies   Allergen Reactions   • Flagyl [Metronidazole] Itching       Current Medications    Current Outpatient Prescriptions:   •  metoprolol tartrate (LOPRESSOR) 25 MG tablet, Take 0.5 tablets by mouth 2 (Two) Times a Day., Disp: 90 tablet, Rfl: 3  •  Multiple Vitamins-Minerals (THRIVE FOR LIFE WOMENS PO), , Disp: , Rfl:   •  warfarin (COUMADIN) 2.5 MG tablet, Take 1 tablet by mouth Daily. Or as directed, Disp: 120 tablet, Rfl: 0  •  warfarin (COUMADIN) 5 MG tablet, Take 1 tablet by mouth Daily. Or as directed, Disp: 120 tablet, Rfl: 0  •  aspirin 81 MG EC tablet, Take 81 mg by mouth Daily., Disp: , Rfl:     History of Present Illness  "    Pt presents in fu post avr + root repair.    She notes significant fatigue and has had recent serologies that did not reveal significant abnormality.  She has young children that occasionally sleeps with her and this causes no rest.  She has only been taking her beta-blockade half tablet once daily and is interested in discontinuation  ROS:  All systems have been reviewed and are negative with the exception of those mentioned in the HPI.    OBJECTIVE:  Vitals:    11/13/17 0923   BP: 134/88   BP Location: Left arm   Patient Position: Sitting   Pulse: 68   Weight: 173 lb 12.8 oz (78.8 kg)   Height: 62\" (157.5 cm)     Physical Exam   Constitutional: She appears well-developed and well-nourished.   Neck: Normal range of motion. Neck supple. No hepatojugular reflux and no JVD present. Carotid bruit is not present. No tracheal deviation present. No thyromegaly present.   Cardiovascular: Normal rate, regular rhythm, S1 normal, S2 normal, intact distal pulses and normal pulses.  PMI is not displaced.  Exam reveals no gallop, no distant heart sounds, no friction rub, no midsystolic click and no opening snap.    No murmur heard.  Pulses:       Radial pulses are 2+ on the right side, and 2+ on the left side.        Dorsalis pedis pulses are 2+ on the right side, and 2+ on the left side.        Posterior tibial pulses are 2+ on the right side, and 2+ on the left side.   Crisp click   Pulmonary/Chest: Effort normal and breath sounds normal. She has no wheezes. She has no rales.   Abdominal: Soft. Bowel sounds are normal. She exhibits no mass. There is no tenderness. There is no guarding.       Diagnostic Data:  Procedures      ASSESSMENT:   Diagnosis Plan   1. Nonrheumatic aortic valve stenosis     2. Palpitations         PLAN:  Bicuspid AV /asc ao aneurysm post repair w nl lvef.    She has my blessings to wean and discontinue beta-blockade and I haven't delineated plan for such.    Fu 6 month w echo then yearly.    Hans " ENMANUEL Pelletier, thank you for referring Ms. Queen for evaluation.  I have forwarded my electronically generated recommendations to you for review.  Please do not hesitate to call with any questions.      Niels Klein MD, FACC

## 2017-11-14 LAB
BH CV ECHO MEAS - AO MAX PG (FULL): -2.8 MMHG
BH CV ECHO MEAS - AO MAX PG: 10.5 MMHG
BH CV ECHO MEAS - AO MEAN PG (FULL): 0 MMHG
BH CV ECHO MEAS - AO MEAN PG: 6 MMHG
BH CV ECHO MEAS - AO ROOT AREA (BSA CORRECTED): 1.1
BH CV ECHO MEAS - AO ROOT AREA: 3.8 CM^2
BH CV ECHO MEAS - AO ROOT DIAM: 2.2 CM
BH CV ECHO MEAS - AO V2 MAX: 162 CM/SEC
BH CV ECHO MEAS - AO V2 MEAN: 116 CM/SEC
BH CV ECHO MEAS - AO V2 VTI: 34.4 CM
BH CV ECHO MEAS - AVA(I,A): 3.4 CM^2
BH CV ECHO MEAS - AVA(I,D): 3.4 CM^2
BH CV ECHO MEAS - AVA(V,A): 3.9 CM^2
BH CV ECHO MEAS - AVA(V,D): 3.9 CM^2
BH CV ECHO MEAS - BSA(HAYCOCK): 2 M^2
BH CV ECHO MEAS - BSA: 2 M^2
BH CV ECHO MEAS - BZI_BMI: 23.7 KILOGRAMS/M^2
BH CV ECHO MEAS - BZI_METRIC_HEIGHT: 182.9 CM
BH CV ECHO MEAS - BZI_METRIC_WEIGHT: 79.4 KG
BH CV ECHO MEAS - CONTRAST EF (2CH): 55.8 ML/M^2
BH CV ECHO MEAS - CONTRAST EF 4CH: 60.4 ML/M^2
BH CV ECHO MEAS - EDV(CUBED): 83.5 ML
BH CV ECHO MEAS - EDV(MOD-SP2): 77 ML
BH CV ECHO MEAS - EDV(MOD-SP4): 111 ML
BH CV ECHO MEAS - EDV(TEICH): 86.3 ML
BH CV ECHO MEAS - EF(CUBED): 75.9 %
BH CV ECHO MEAS - EF(MOD-SP2): 55.8 %
BH CV ECHO MEAS - EF(MOD-SP4): 60.4 %
BH CV ECHO MEAS - EF(TEICH): 68.1 %
BH CV ECHO MEAS - ESV(CUBED): 20.1 ML
BH CV ECHO MEAS - ESV(MOD-SP2): 34 ML
BH CV ECHO MEAS - ESV(MOD-SP4): 44 ML
BH CV ECHO MEAS - ESV(TEICH): 27.5 ML
BH CV ECHO MEAS - FS: 37.8 %
BH CV ECHO MEAS - IVS/LVPW: 1.1
BH CV ECHO MEAS - IVSD: 0.99 CM
BH CV ECHO MEAS - LA DIMENSION: 3.3 CM
BH CV ECHO MEAS - LA/AO: 1.5
BH CV ECHO MEAS - LV DIASTOLIC VOL/BSA (35-75): 55.1 ML/M^2
BH CV ECHO MEAS - LV MASS(C)D: 135.4 GRAMS
BH CV ECHO MEAS - LV MASS(C)DI: 67.3 GRAMS/M^2
BH CV ECHO MEAS - LV MAX PG: 13.2 MMHG
BH CV ECHO MEAS - LV MEAN PG: 6 MMHG
BH CV ECHO MEAS - LV SYSTOLIC VOL/BSA (12-30): 21.9 ML/M^2
BH CV ECHO MEAS - LV V1 MAX: 182 CM/SEC
BH CV ECHO MEAS - LV V1 MEAN: 116 CM/SEC
BH CV ECHO MEAS - LV V1 VTI: 33.4 CM
BH CV ECHO MEAS - LVIDD: 4.4 CM
BH CV ECHO MEAS - LVIDS: 2.7 CM
BH CV ECHO MEAS - LVLD AP2: 7.5 CM
BH CV ECHO MEAS - LVLD AP4: 7.9 CM
BH CV ECHO MEAS - LVLS AP2: 5.8 CM
BH CV ECHO MEAS - LVLS AP4: 6.4 CM
BH CV ECHO MEAS - LVOT AREA (M): 3.5 CM^2
BH CV ECHO MEAS - LVOT AREA: 3.5 CM^2
BH CV ECHO MEAS - LVOT DIAM: 2.1 CM
BH CV ECHO MEAS - LVPWD: 0.9 CM
BH CV ECHO MEAS - MV A MAX VEL: 48.4 CM/SEC
BH CV ECHO MEAS - MV E MAX VEL: 77 CM/SEC
BH CV ECHO MEAS - MV E/A: 1.6
BH CV ECHO MEAS - PA ACC SLOPE: 671 CM/SEC^2
BH CV ECHO MEAS - PA ACC TIME: 0.12 SEC
BH CV ECHO MEAS - PA PR(ACCEL): 26.8 MMHG
BH CV ECHO MEAS - RAP SYSTOLE: 8 MMHG
BH CV ECHO MEAS - RVDD: 2.6 CM
BH CV ECHO MEAS - RVSP: 25 MMHG
BH CV ECHO MEAS - SI(AO): 65 ML/M^2
BH CV ECHO MEAS - SI(CUBED): 31.5 ML/M^2
BH CV ECHO MEAS - SI(LVOT): 57.5 ML/M^2
BH CV ECHO MEAS - SI(MOD-SP2): 21.4 ML/M^2
BH CV ECHO MEAS - SI(MOD-SP4): 33.3 ML/M^2
BH CV ECHO MEAS - SI(TEICH): 29.2 ML/M^2
BH CV ECHO MEAS - SV(AO): 130.8 ML
BH CV ECHO MEAS - SV(CUBED): 63.3 ML
BH CV ECHO MEAS - SV(LVOT): 115.7 ML
BH CV ECHO MEAS - SV(MOD-SP2): 43 ML
BH CV ECHO MEAS - SV(MOD-SP4): 67 ML
BH CV ECHO MEAS - SV(TEICH): 58.8 ML
BH CV ECHO MEAS - TAPSE (>1.6): 1.7 CM2
BH CV ECHO MEAS - TR MAX VEL: 207 CM/SEC
BH CV VAS BP LEFT ARM: NORMAL MMHG
BH CV XLRA - RV BASE: 2.9 CM
BH CV XLRA - RV LENGTH: 5.7 CM
BH CV XLRA - RV MID: 2.7 CM
LEFT ATRIUM VOLUME INDEX: 16 ML/M2
LV EF 2D ECHO EST: 60 %

## 2017-11-16 RX ORDER — WARFARIN SODIUM 2.5 MG/1
TABLET ORAL
Qty: 120 TABLET | Refills: 0 | Status: SHIPPED | OUTPATIENT
Start: 2017-11-16 | End: 2018-01-18 | Stop reason: SDUPTHER

## 2017-11-20 ENCOUNTER — ANTICOAGULATION VISIT (OUTPATIENT)
Dept: PHARMACY | Facility: HOSPITAL | Age: 32
End: 2017-11-20

## 2017-11-20 ENCOUNTER — LAB (OUTPATIENT)
Dept: LAB | Facility: HOSPITAL | Age: 32
End: 2017-11-20

## 2017-11-20 DIAGNOSIS — Z95.2 HX OF AORTIC VALVE REPLACEMENT, MECHANICAL: ICD-10-CM

## 2017-11-20 DIAGNOSIS — Z95.2 S/P AVR (AORTIC VALVE REPLACEMENT): ICD-10-CM

## 2017-11-20 LAB
INR PPP: 1.42 (ref 0.9–1.1)
PROTHROMBIN TIME: 17.5 SECONDS (ref 11–15.4)

## 2017-11-20 PROCEDURE — 85610 PROTHROMBIN TIME: CPT

## 2017-11-20 PROCEDURE — 36415 COLL VENOUS BLD VENIPUNCTURE: CPT

## 2017-11-20 NOTE — PROGRESS NOTES
Anticoagulation Clinic - Remote Progress Note  REMOTE LAB    Indication: mechanical AVR (On-X)  Referring Provider: Latoya  Initial Warfarin Start Date:   Goal INR: 1.5-2.5  Current Drug Interactions: aspirin    Diet: one serving of something green once weekly (salads with raw spinach, asparagus -- avoids broccoli, etc)  Alcohol: none  Tobacco: none  OTC Pain Medication: APAP PRN    INR History:  Date 8/16 8/25 9/6 9/15 10/2 10/16 10/30 11/20    Total Weekly Dose 57.5 mg 57.5 mg 52.5 mg 37.5-45 mg 52.5 mg 52.5 mg 52.5 mg 45mg 55 mg   INR 2.76 2.77 2.70 1.7 1.55 2.04 2.93 1.42    Notes   fewer GLV miss dose x 2?  Thrive Thrive miss dose x 1      Phone Interview:  Tablet Strength: pt has 5mg tablets  Current Maintenance Dose: 7.5mg daily  Patient Findings      Positives Missed doses     Negatives Signs/symptoms of thrombosis, Signs/symptoms of bleeding, Laboratory test error suspected, Change in health, Change in alcohol use, Change in activity, Upcoming invasive procedure, Emergency department visit, Upcoming dental procedure, Extra doses, Change in medications, Change in diet/appetite, Hospital admission, Bruising, Other complaints     Comments Mrs. Queen reports missing her dose of warfarin last Tuesday night, so we discussed that she can take a missed dose if she realizes within about 12 hours of normal admin time. She verbalized understanding.      Patient Contact Info: [phone]   Lab Contact Info: [facility]    Plan:  1. INR is slightly subtherapeutic today, likely a result of missed dose last week. For now (given today's reading + concern for dietary changes with upcoming holiday), instructed Mrs. Queen to increase her dose 5% to warfarin 7.5mg daily except 10mg on Mondays.  2. Repeat INR in 2 weeks.  3. Verbal information provided over the phone to Mrs. Queen. She RBV dosing instructions, expresses understanding with teach back, and she has no further questions at this time.    Grecia Segovia,  [facility]  11/20/2017  3:18 PM

## 2017-12-08 ENCOUNTER — TRANSCRIBE ORDERS (OUTPATIENT)
Dept: CARDIOLOGY | Facility: CLINIC | Age: 32
End: 2017-12-08

## 2017-12-08 ENCOUNTER — LAB (OUTPATIENT)
Dept: LAB | Facility: HOSPITAL | Age: 32
End: 2017-12-08

## 2017-12-08 ENCOUNTER — ANTICOAGULATION VISIT (OUTPATIENT)
Dept: PHARMACY | Facility: HOSPITAL | Age: 32
End: 2017-12-08

## 2017-12-08 DIAGNOSIS — Z95.2 HX OF AORTIC VALVE REPLACEMENT, MECHANICAL: ICD-10-CM

## 2017-12-08 DIAGNOSIS — Z95.2 S/P AVR (AORTIC VALVE REPLACEMENT): ICD-10-CM

## 2017-12-08 LAB
INR PPP: 2.55 (ref 0.9–1.1)
PROTHROMBIN TIME: 27.8 SECONDS (ref 11–15.4)

## 2017-12-08 PROCEDURE — 85610 PROTHROMBIN TIME: CPT

## 2017-12-08 PROCEDURE — 36415 COLL VENOUS BLD VENIPUNCTURE: CPT

## 2017-12-08 NOTE — PROGRESS NOTES
Anticoagulation Clinic - Remote Progress Note  REMOTE LAB    Indication: mechanical AVR (On-X)  Referring Provider: Latoya  Initial Warfarin Start Date:   Goal INR: 1.5-2.5  Current Drug Interactions: aspirin    Diet: one serving of something green once weekly (salads with raw spinach, asparagus -- avoids broccoli, etc)  Alcohol: none  Tobacco: none  OTC Pain Medication: APAP PRN    INR History:  Date 8/16 8/25 9/6 9/15 10/2 10/16 10/30 11/20 12/8   Total Weekly Dose 57.5 mg 57.5 mg 52.5 mg 37.5-45 mg 52.5 mg 52.5 mg 52.5 mg 45mg 55 mg   INR 2.76 2.77 2.70 1.7 1.55 2.04 2.93 1.42 2.55   Notes   fewer GLV miss dose x 2?  Thrive Thrive miss dose x 1 ill; Dex / Roceph IM inj.     Phone Interview:  Tablet Strength: pt has 5mg tablets  Current Maintenance Dose: 7.5mg daily except 10mg on Mondays  Patient Findings      Positives Change in health, Change in medications     Negatives Signs/symptoms of thrombosis, Signs/symptoms of bleeding, Laboratory test error suspected, Change in alcohol use, Change in activity, Upcoming invasive procedure, Emergency department visit, Upcoming dental procedure, Missed doses, Extra doses, Change in diet/appetite, Hospital admission, Bruising, Other complaints     Comments Mrs. Queen was diagnosed with bronchitis last week and received two shots at PCP's office ([facility] -- called to verify: pt received Rocephin + Dexamethasone injections). She is feeling much better now and denies additional changes or s/sx of bleeding at this time.     Patient Contact Info: [phone]   Lab Contact Info: [facility]    Plan:  1. INR is slightly supratherapeutic (goal 1.5-2.5), likely a result of recent illness and abx/steorid injections. For now, instructed Mrs. Queen to have an extra serving of GLV tonight / tomorrow, then continue warfarin 7.5mg daily except 10mg on Mondays.  2. Repeat INR in 2 weeks.  3. Verbal information provided over the phone to Mrs. Queen. She RBV dosing  instructions, expresses understanding with teach back, and she has no further questions at this time.    Grecia Segovia [facility]  12/8/2017  12:17 PM

## 2017-12-11 ENCOUNTER — TRANSCRIBE ORDERS (OUTPATIENT)
Dept: ADMINISTRATIVE | Facility: HOSPITAL | Age: 32
End: 2017-12-11

## 2017-12-11 ENCOUNTER — HOSPITAL ENCOUNTER (OUTPATIENT)
Dept: GENERAL RADIOLOGY | Facility: HOSPITAL | Age: 32
Discharge: HOME | End: 2017-12-11
Admitting: NURSE PRACTITIONER

## 2017-12-11 DIAGNOSIS — R05.9 COUGH: Primary | ICD-10-CM

## 2017-12-11 DIAGNOSIS — R50.9 HYPERTHERMIA-INDUCED DEFECT: ICD-10-CM

## 2017-12-11 DIAGNOSIS — R05.9 COUGH: ICD-10-CM

## 2017-12-11 PROCEDURE — 71020 XR CHEST PA AND LATERAL: CPT | Performed by: RADIOLOGY

## 2017-12-11 PROCEDURE — 71020 HC CHEST PA AND LATERAL: CPT

## 2017-12-26 ENCOUNTER — LAB (OUTPATIENT)
Dept: LAB | Facility: HOSPITAL | Age: 32
End: 2017-12-26

## 2017-12-26 ENCOUNTER — TRANSCRIBE ORDERS (OUTPATIENT)
Dept: ADMINISTRATIVE | Facility: HOSPITAL | Age: 32
End: 2017-12-26
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
## 2017-12-26 DIAGNOSIS — Z95.2 HEART VALVE REPLACED BY TRANSPLANT: ICD-10-CM

## 2017-12-26 LAB
INR PPP: 2.72 (ref 0.9–1.1)
PROTHROMBIN TIME: 29.3 SECONDS (ref 11–15.4)

## 2017-12-26 PROCEDURE — 85610 PROTHROMBIN TIME: CPT

## 2017-12-26 PROCEDURE — 36415 COLL VENOUS BLD VENIPUNCTURE: CPT

## 2017-12-27 ENCOUNTER — ANTICOAGULATION VISIT (OUTPATIENT)
Dept: PHARMACY | Facility: HOSPITAL | Age: 32
End: 2017-12-27

## 2017-12-27 DIAGNOSIS — Z95.2 HX OF AORTIC VALVE REPLACEMENT, MECHANICAL: ICD-10-CM

## 2017-12-27 NOTE — PROGRESS NOTES
Anticoagulation Clinic - Remote Progress Note  REMOTE LAB    Indication: mechanical AVR (On-X)  Referring Provider: Latoya  Initial Warfarin Start Date:   Goal INR: 1.5-2.5  Current Drug Interactions: aspirin    Diet: one serving of something green once weekly (salads with raw spinach, asparagus -- avoids broccoli, etc)  Alcohol: none  Tobacco: none  OTC Pain Medication: APAP PRN    INR History:  Date 8/16 8/25 9/6 9/15 10/2 10/16 10/30 11/20 12/8   Total Weekly Dose 57.5 mg 57.5 mg 52.5 mg 37.5-45 mg 52.5 mg 52.5 mg 52.5 mg 45mg 55 mg   INR 2.76 2.77 2.70 1.7 1.55 2.04 2.93 1.42 2.55   Notes   fewer GLV miss dose x 2?  Thrive Thrive miss dose x 1 ill; Dex / Roceph IM inj.     Date 12/26           Total Weekly Dose 55mg 52.5 mg          INR 2.72           Notes flu; Tamiflu             Phone Interview:  Tablet Strength: pt has 5mg tablets  Current Maintenance Dose: 7.5mg daily except 10mg on Mondays  Patient Findings      Positives Change in health, Change in medications     Negatives Signs/symptoms of thrombosis, Signs/symptoms of bleeding, Laboratory test error suspected, Change in alcohol use, Change in activity, Upcoming invasive procedure, Emergency department visit, Upcoming dental procedure, Missed doses, Extra doses, Change in diet/appetite, Hospital admission, Bruising, Other complaints     Comments Mrs. Queen was diagnosed with influenza A and B, and she was prescribed Tamiflu (on/around 12/11). INR elevation expected about 5 days after Tamiflu initiation.     Patient Contact Info: [phone]   Lab Contact Info:  [facility]    Plan:  1. INR is supratherapeutic, so instructed Mrs. Queen to lower her weekly dose (4.5%) to warfarin 7.5mg daily.  2. Repeat INR in 2 weeks.  3. Verbal information provided over the phone to Mrs. Queen. She RBV dosing instructions, expresses understanding with teach back, and she has no further questions at this time.    Grecia Segovia [facility]  12/27/2017  9:00 AM

## 2018-01-09 ENCOUNTER — TRANSCRIBE ORDERS (OUTPATIENT)
Dept: ADMINISTRATIVE | Facility: HOSPITAL | Age: 33
End: 2018-01-09

## 2018-01-09 ENCOUNTER — LAB (OUTPATIENT)
Dept: LAB | Facility: HOSPITAL | Age: 33
End: 2018-01-09

## 2018-01-09 DIAGNOSIS — Z95.2 S/P AVR (AORTIC VALVE REPLACEMENT): ICD-10-CM

## 2018-01-09 LAB
INR PPP: 2.3 (ref 0.9–1.1)
PROTHROMBIN TIME: 25.6 SECONDS (ref 11–15.4)

## 2018-01-09 PROCEDURE — 85610 PROTHROMBIN TIME: CPT

## 2018-01-09 PROCEDURE — 36415 COLL VENOUS BLD VENIPUNCTURE: CPT

## 2018-01-10 ENCOUNTER — ANTICOAGULATION VISIT (OUTPATIENT)
Dept: PHARMACY | Facility: HOSPITAL | Age: 33
End: 2018-01-10

## 2018-01-10 DIAGNOSIS — Z95.2 HX OF AORTIC VALVE REPLACEMENT, MECHANICAL: ICD-10-CM

## 2018-01-10 RX ORDER — LORATADINE 10 MG/1
CAPSULE, LIQUID FILLED ORAL AS NEEDED
COMMUNITY
End: 2019-05-16 | Stop reason: ALTCHOICE

## 2018-01-10 NOTE — PROGRESS NOTES
Anticoagulation Clinic - Remote Progress Note  REMOTE LAB    Indication: mechanical AVR (On-X)  Referring Provider: Latoya  Initial Warfarin Start Date:   Goal INR: 1.5-2.5  Current Drug Interactions: aspirin    Diet: one serving of something green once weekly (salads with raw spinach, asparagus -- avoids broccoli, etc)  Alcohol: none  Tobacco: none  OTC Pain Medication: APAP PRN    INR History:  Date 8/16 8/25 9/6 9/15 10/2 10/16 10/30 11/20 12/8   Total Weekly Dose 57.5 mg 57.5 mg 52.5 mg 37.5-45 mg 52.5 mg 52.5 mg 52.5 mg 45mg 55 mg   INR 2.76 2.77 2.70 1.7 1.55 2.04 2.93 1.42 2.55   Notes   fewer GLV miss dose x 2?  Thrive Thrive miss dose x 1 ill; Dex / Roceph IM inj.     Date 12/26 1/9          Total Weekly Dose 55mg 52.5 mg          INR 2.72 2.3          Notes flu; Tamiflu             Phone Interview:  Tablet Strength: pt has 5mg tablets  Current Maintenance Dose: 7.5mg daily except 10mg on Mondays  Patient Findings      Positives Change in medications     Negatives Signs/symptoms of thrombosis, Signs/symptoms of bleeding, Laboratory test error suspected, Change in health, Change in alcohol use, Change in activity, Upcoming invasive procedure, Emergency department visit, Upcoming dental procedure, Missed doses, Extra doses, Change in diet/appetite, Hospital admission, Bruising, Other complaints     Comments Patient is taking claritin for allergies. Patient admits to eating broccoli last Thursday which is out of the ordinary for patient.      Patient Contact Info: [phone]   Lab Contact Info: [facility]    Plan:  1. INR is therapeutic today following dose adjustment. Therefore instructed Mrs. Queen to continue warfarin 7.5mg daily.  2. Repeat INR in 2 weeks 1/23/18.  3. Verbal information provided over the phone to Mrs. Queen. She RBV dosing instructions, expresses understanding with teach back, and she has no further questions at this time.    Carmelita Morales, PharmD  1/10/2018  11:12 AM

## 2018-01-18 RX ORDER — WARFARIN SODIUM 2.5 MG/1
TABLET ORAL
Qty: 60 TABLET | Refills: 1 | Status: SHIPPED | OUTPATIENT
Start: 2018-01-18 | End: 2018-10-17 | Stop reason: SDUPTHER

## 2018-01-22 ENCOUNTER — LAB (OUTPATIENT)
Dept: LAB | Facility: HOSPITAL | Age: 33
End: 2018-01-22

## 2018-01-22 ENCOUNTER — ANTICOAGULATION VISIT (OUTPATIENT)
Dept: PHARMACY | Facility: HOSPITAL | Age: 33
End: 2018-01-22

## 2018-01-22 DIAGNOSIS — Z95.2 HX OF AORTIC VALVE REPLACEMENT, MECHANICAL: ICD-10-CM

## 2018-01-22 DIAGNOSIS — Z95.2 S/P AVR (AORTIC VALVE REPLACEMENT): ICD-10-CM

## 2018-01-22 LAB
INR PPP: 2.35 (ref 0.9–1.1)
PROTHROMBIN TIME: 26 SECONDS (ref 11–15.4)

## 2018-01-22 PROCEDURE — 36415 COLL VENOUS BLD VENIPUNCTURE: CPT

## 2018-01-22 PROCEDURE — 85610 PROTHROMBIN TIME: CPT

## 2018-01-22 NOTE — PROGRESS NOTES
Anticoagulation Clinic - Remote Progress Note  REMOTE LAB    Indication: mechanical AVR (On-X)  Referring Provider: Latoya  Initial Warfarin Start Date:   Goal INR: 1.5-2.5  Current Drug Interactions: aspirin    Diet: one serving of something green once weekly (salads with raw spinach, asparagus -- avoids broccoli, etc)  Alcohol: none  Tobacco: none  OTC Pain Medication: APAP PRN    INR History:  Date 8/16 8/25 9/6 9/15 10/2 10/16 10/30 11/20 12/8   Total Weekly Dose 57.5 mg 57.5 mg 52.5 mg 37.5-45 mg 52.5 mg 52.5 mg 52.5 mg 45mg 55 mg   INR 2.76 2.77 2.70 1.7 1.55 2.04 2.93 1.42 2.55   Notes   fewer GLV miss dose x 2?  Thrive Thrive miss dose x 1 ill; Dex / Roceph IM inj.     Date 12/26 1/9 1/22         Total Weekly Dose 55mg 52.5 mg 52.5mg         INR 2.72 2.3 2.35         Notes flu; Tamiflu             Phone Interview:  Tablet Strength: pt has 5mg tablets  Current Maintenance Dose: 7.5mg daily except 10mg on Mondays    Patient Contact Info: [phone]   Lab Contact Info: [facility]    Plan:  1. INR is therapeutic today at 2.35. Instructed Mrs. Queen to continue warfarin 7.5mg daily.  2. Repeat INR in 3 weeks. (2/12)  3. Verbal information provided over the phone to Mrs. Queen. She RBV dosing instructions, expresses understanding with teach back, and she has no further questions at this time.  4. Patient declines warfarin refills.     Roxana Laura, Pharmacy Technician  1/22/2018  12:08 PM

## 2018-01-22 NOTE — PROGRESS NOTES
Anticoagulation Clinic - Remote Progress Note  REMOTE LAB    Indication: mechanical AVR (On-X)  Referring Provider: Latoya  Initial Warfarin Start Date:   Goal INR: 1.5-2.5  Current Drug Interactions: aspirin    Diet: one serving of something green once weekly (salads with raw spinach, asparagus -- avoids broccoli, etc)  Alcohol: none  Tobacco: none  OTC Pain Medication: APAP PRN    INR History:  Date 8/16 8/25 9/6 9/15 10/2 10/16 10/30 11/20 12/8   Total Weekly Dose 57.5 mg 57.5 mg 52.5 mg 37.5-45 mg 52.5 mg 52.5 mg 52.5 mg 45mg 55 mg   INR 2.76 2.77 2.70 1.7 1.55 2.04 2.93 1.42 2.55   Notes   fewer GLV miss dose x 2?  Thrive Thrive miss dose x 1 ill; Dex / Roceph IM inj.     Date 12/26 1/9 1/22         Total Weekly Dose 55mg 52.5 mg 52.5mg         INR 2.72 2.3 2.35         Notes flu; Tamiflu             Phone Interview:  Tablet Strength: pt has 5mg tablets  Current Maintenance Dose: 7.5mg daily  Patient Findings      Negatives Signs/symptoms of thrombosis, Signs/symptoms of bleeding, Laboratory test error suspected, Change in health, Change in alcohol use, Change in activity, Upcoming invasive procedure, Emergency department visit, Upcoming dental procedure, Missed doses, Extra doses, Change in medications, Change in diet/appetite, Hospital admission, Bruising, Other complaints     Patient Contact Info: [phone]   Lab Contact Info: [facility]    Plan:  1. INR is therapeutic today at 2.35. Therefore instructed Mrs. Queen to continue warfarin 7.5mg daily.  2. Repeat INR in 2 weeks.  3. Verbal information provided over the phone to Mrs. Queen. She RBV dosing instructions, expresses understanding with teach back, and she has no further questions at this time.    Calista Lam, Pharmacy Intern  1/22/2018  12:23 PM     Grecia BROUSSARD, [facility], have reviewed the note in full and agree with the assessment and plan.  01/22/18  1:15 PM

## 2018-02-06 ENCOUNTER — LAB (OUTPATIENT)
Dept: LAB | Facility: HOSPITAL | Age: 33
End: 2018-02-06

## 2018-02-06 DIAGNOSIS — Z95.2 S/P AVR (AORTIC VALVE REPLACEMENT): ICD-10-CM

## 2018-02-06 LAB
INR PPP: 2.68 (ref 0.9–1.1)
PROTHROMBIN TIME: 28.9 SECONDS (ref 11–15.4)

## 2018-02-06 PROCEDURE — 36415 COLL VENOUS BLD VENIPUNCTURE: CPT

## 2018-02-06 PROCEDURE — 85610 PROTHROMBIN TIME: CPT

## 2018-02-07 ENCOUNTER — ANTICOAGULATION VISIT (OUTPATIENT)
Dept: PHARMACY | Facility: HOSPITAL | Age: 33
End: 2018-02-07

## 2018-02-07 DIAGNOSIS — Z95.2 HX OF AORTIC VALVE REPLACEMENT, MECHANICAL: ICD-10-CM

## 2018-02-07 NOTE — PROGRESS NOTES
Anticoagulation Clinic - Remote Progress Note  REMOTE LAB    Indication: mechanical AVR (On-X)  Referring Provider: Latoya  Initial Warfarin Start Date:   Goal INR: 1.5-2.5  Current Drug Interactions: aspirin    Diet: one serving of something green once weekly (salads with raw spinach, asparagus -- avoids broccoli, etc)  Alcohol: none  Tobacco: none  OTC Pain Medication: APAP PRN    INR History:  Date 8/16 8/25 9/6 9/15 10/2 10/16 10/30 11/20 12/8   Total Weekly Dose 57.5 mg 57.5 mg 52.5 mg 37.5-45 mg 52.5 mg 52.5 mg 52.5 mg 45mg 55 mg   INR 2.76 2.77 2.70 1.7 1.55 2.04 2.93 1.42 2.55   Notes   fewer GLV miss dose x 2?  Thrive Thrive miss dose x 1 ill; Dex / Roceph IM inj.     Date 12/26 1/9 1/22 2/6        Total Weekly Dose 55mg 52.5 mg 52.5mg 52.5mg        INR 2.72 2.3 2.35 2.68        Notes flu; Tamiflu             Phone Interview:  Tablet Strength: pt has 5mg tablets  Current Maintenance Dose: 7.5mg daily  Patient Contact Info: [phone]   Lab Contact Info: [facility]  Patient Findings      Positives Change in medications     Negatives Signs/symptoms of thrombosis, Signs/symptoms of bleeding, Laboratory test error suspected, Change in health, Change in alcohol use, Change in activity, Upcoming invasive procedure, Emergency department visit, Upcoming dental procedure, Missed doses, Extra doses, Change in diet/appetite, Hospital admission, Bruising, Other complaints     Comments Mrs. Queen has started taking a gummy MVI. She otherwise denies changes in medications, diet, or health.      Plan:  1. INR is slightly supratherapeutic today, uncertain the cause. For now, instructed Mrs. Queen to have a serving of GLV tonight and continue warfarin 7.5mg daily.  2. Repeat INR in 2 weeks. If INR remains elevated, will consider dose decrease at that time (50mg weekly).   3. Verbal information provided over the phone to Mrs. Queen. She RBV dosing instructions, expresses understanding with teach back, and she has no  further questions at this time.    Grecia Segovia [facility]  2/7/2018  11:56 AM

## 2018-02-22 ENCOUNTER — LAB (OUTPATIENT)
Dept: LAB | Facility: HOSPITAL | Age: 33
End: 2018-02-22

## 2018-02-22 ENCOUNTER — ANTICOAGULATION VISIT (OUTPATIENT)
Dept: PHARMACY | Facility: HOSPITAL | Age: 33
End: 2018-02-22

## 2018-02-22 DIAGNOSIS — Z95.2 S/P AVR (AORTIC VALVE REPLACEMENT): ICD-10-CM

## 2018-02-22 DIAGNOSIS — Z95.2 HX OF AORTIC VALVE REPLACEMENT, MECHANICAL: ICD-10-CM

## 2018-02-22 LAB
INR PPP: 2.3 (ref 0.9–1.1)
PROTHROMBIN TIME: 25.6 SECONDS (ref 11–15.4)

## 2018-02-22 PROCEDURE — 36415 COLL VENOUS BLD VENIPUNCTURE: CPT

## 2018-02-22 PROCEDURE — 85610 PROTHROMBIN TIME: CPT

## 2018-02-22 NOTE — PROGRESS NOTES
Anticoagulation Clinic - Remote Progress Note  REMOTE LAB    Indication: mechanical AVR (On-X)  Referring Provider: Latoya  Initial Warfarin Start Date:   Goal INR: 1.5-2.5  Current Drug Interactions: aspirin    Diet: one serving of something green once weekly (salads with raw spinach, asparagus -- avoids broccoli, etc)  Alcohol: none  Tobacco: none  OTC Pain Medication: APAP PRN    INR History:  Date 8/16 8/25 9/6 9/15 10/2 10/16 10/30 11/20 12/8   Total Weekly Dose 57.5 mg 57.5 mg 52.5 mg 37.5-45 mg 52.5 mg 52.5 mg 52.5 mg 45mg 55 mg   INR 2.76 2.77 2.70 1.7 1.55 2.04 2.93 1.42 2.55   Notes   fewer GLV miss dose x 2?  Thrive Thrive miss dose x 1 ill; Dex / Roceph IM inj.     Date 12/26 1/9 1/22 2/6 2/22       Total Weekly Dose 55mg 52.5 mg 52.5mg 52.5mg 52.5 mg       INR 2.72 2.3 2.35 2.68 2.3       Notes flu; Tamiflu             Phone Interview:  Tablet Strength: pt has 5mg tablets  Current Maintenance Dose: 7.5mg daily  Patient Contact Info: [phone]   Lab Contact Info:  [facility]    Patient Findings      Negatives Signs/symptoms of thrombosis, Signs/symptoms of bleeding, Laboratory test error suspected, Change in health, Change in alcohol use, Change in activity, Upcoming invasive procedure, Emergency department visit, Upcoming dental procedure, Missed doses, Extra doses, Change in medications, Change in diet/appetite, Hospital admission, Bruising, Other complaints     Plan:  1. INR is therapeutic today at 2.3. For now, instructed Mrs. Queen to continue warfarin 7.5mg daily.  2. Repeat INR in 2 weeks to ensure WNL.  3. Verbal information provided over the phone to Mrs. Queen. She RBV dosing instructions, expresses understanding with teach back, and she has no further questions at this time.    Lizy Mark [facility]  2/22/2018  2:54 PM

## 2018-03-13 ENCOUNTER — ANTICOAGULATION VISIT (OUTPATIENT)
Dept: PHARMACY | Facility: HOSPITAL | Age: 33
End: 2018-03-13

## 2018-03-13 ENCOUNTER — LAB (OUTPATIENT)
Dept: LAB | Facility: HOSPITAL | Age: 33
End: 2018-03-13

## 2018-03-13 DIAGNOSIS — Z95.2 HX OF AORTIC VALVE REPLACEMENT, MECHANICAL: ICD-10-CM

## 2018-03-13 DIAGNOSIS — Z95.2 S/P AVR (AORTIC VALVE REPLACEMENT): ICD-10-CM

## 2018-03-13 LAB
INR PPP: 2.55 (ref 0.9–1.1)
PROTHROMBIN TIME: 27.8 SECONDS (ref 11–15.4)

## 2018-03-13 PROCEDURE — 36415 COLL VENOUS BLD VENIPUNCTURE: CPT

## 2018-03-13 PROCEDURE — 85610 PROTHROMBIN TIME: CPT

## 2018-04-05 ENCOUNTER — LAB (OUTPATIENT)
Dept: LAB | Facility: HOSPITAL | Age: 33
End: 2018-04-05

## 2018-04-05 ENCOUNTER — TRANSCRIBE ORDERS (OUTPATIENT)
Dept: ADMINISTRATIVE | Facility: HOSPITAL | Age: 33
End: 2018-04-05

## 2018-04-05 DIAGNOSIS — R53.83 FATIGUE, UNSPECIFIED TYPE: ICD-10-CM

## 2018-04-05 DIAGNOSIS — E55.9 VITAMIN D DEFICIENCY: ICD-10-CM

## 2018-04-05 DIAGNOSIS — Z00.00 ROUTINE GENERAL MEDICAL EXAMINATION AT A HEALTH CARE FACILITY: ICD-10-CM

## 2018-04-05 DIAGNOSIS — E55.9 AVITAMINOSIS D: Primary | ICD-10-CM

## 2018-04-05 DIAGNOSIS — Z95.2 S/P AVR (AORTIC VALVE REPLACEMENT): ICD-10-CM

## 2018-04-05 LAB
25(OH)D3 SERPL-MCNC: 28 NG/ML
ALBUMIN SERPL-MCNC: 4 G/DL (ref 3.5–5)
ALBUMIN/GLOB SERPL: 1.3 G/DL (ref 1.5–2.5)
ALP SERPL-CCNC: 89 U/L (ref 35–104)
ALT SERPL W P-5'-P-CCNC: 16 U/L (ref 10–36)
ANION GAP SERPL CALCULATED.3IONS-SCNC: 7.4 MMOL/L (ref 3.6–11.2)
AST SERPL-CCNC: 21 U/L (ref 10–30)
BASOPHILS # BLD AUTO: 0.04 10*3/MM3 (ref 0–0.3)
BASOPHILS NFR BLD AUTO: 0.6 % (ref 0–2)
BILIRUB SERPL-MCNC: 0.3 MG/DL (ref 0.2–1.8)
BUN BLD-MCNC: 7 MG/DL (ref 7–21)
BUN/CREAT SERPL: 8.5 (ref 7–25)
CALCIUM SPEC-SCNC: 9.5 MG/DL (ref 7.7–10)
CHLORIDE SERPL-SCNC: 109 MMOL/L (ref 99–112)
CHOLEST SERPL-MCNC: 191 MG/DL (ref 0–200)
CO2 SERPL-SCNC: 24.6 MMOL/L (ref 24.3–31.9)
CREAT BLD-MCNC: 0.82 MG/DL (ref 0.43–1.29)
DEPRECATED RDW RBC AUTO: 41.8 FL (ref 37–54)
EOSINOPHIL # BLD AUTO: 0.39 10*3/MM3 (ref 0–0.7)
EOSINOPHIL NFR BLD AUTO: 6 % (ref 0–5)
ERYTHROCYTE [DISTWIDTH] IN BLOOD BY AUTOMATED COUNT: 13.6 % (ref 11.5–14.5)
GFR SERPL CREATININE-BSD FRML MDRD: 80 ML/MIN/1.73
GLOBULIN UR ELPH-MCNC: 3.1 GM/DL
GLUCOSE BLD-MCNC: 88 MG/DL (ref 70–110)
HCT VFR BLD AUTO: 40 % (ref 37–47)
HDLC SERPL-MCNC: 42 MG/DL (ref 60–100)
HGB BLD-MCNC: 13.5 G/DL (ref 12–16)
IMM GRANULOCYTES # BLD: 0.01 10*3/MM3 (ref 0–0.03)
IMM GRANULOCYTES NFR BLD: 0.2 % (ref 0–0.5)
INR PPP: 2.72 (ref 0.9–1.1)
LDLC SERPL CALC-MCNC: 104 MG/DL (ref 0–100)
LDLC/HDLC SERPL: 2.49 {RATIO}
LYMPHOCYTES # BLD AUTO: 2.2 10*3/MM3 (ref 1–3)
LYMPHOCYTES NFR BLD AUTO: 34.1 % (ref 21–51)
MCH RBC QN AUTO: 29 PG (ref 27–33)
MCHC RBC AUTO-ENTMCNC: 33.8 G/DL (ref 33–37)
MCV RBC AUTO: 85.8 FL (ref 80–94)
MONOCYTES # BLD AUTO: 0.27 10*3/MM3 (ref 0.1–0.9)
MONOCYTES NFR BLD AUTO: 4.2 % (ref 0–10)
NEUTROPHILS # BLD AUTO: 3.55 10*3/MM3 (ref 1.4–6.5)
NEUTROPHILS NFR BLD AUTO: 54.9 % (ref 30–70)
OSMOLALITY SERPL CALC.SUM OF ELEC: 278.6 MOSM/KG (ref 273–305)
PLATELET # BLD AUTO: 239 10*3/MM3 (ref 130–400)
PMV BLD AUTO: 9.9 FL (ref 6–10)
POTASSIUM BLD-SCNC: 4 MMOL/L (ref 3.5–5.3)
PROT SERPL-MCNC: 7.1 G/DL (ref 6–8)
PROTHROMBIN TIME: 29.2 SECONDS (ref 11–15.4)
RBC # BLD AUTO: 4.66 10*6/MM3 (ref 4.2–5.4)
SODIUM BLD-SCNC: 141 MMOL/L (ref 135–153)
T4 FREE SERPL-MCNC: 1.18 NG/DL (ref 0.89–1.76)
TRIGL SERPL-MCNC: 223 MG/DL (ref 0–150)
TSH SERPL DL<=0.05 MIU/L-ACNC: 2.5 MIU/ML (ref 0.55–4.78)
VLDLC SERPL-MCNC: 44.6 MG/DL
WBC NRBC COR # BLD: 6.46 10*3/MM3 (ref 4.5–12.5)

## 2018-04-05 PROCEDURE — 84443 ASSAY THYROID STIM HORMONE: CPT

## 2018-04-05 PROCEDURE — 84439 ASSAY OF FREE THYROXINE: CPT

## 2018-04-05 PROCEDURE — 80053 COMPREHEN METABOLIC PANEL: CPT

## 2018-04-05 PROCEDURE — 82306 VITAMIN D 25 HYDROXY: CPT

## 2018-04-05 PROCEDURE — 80061 LIPID PANEL: CPT

## 2018-04-05 PROCEDURE — 36415 COLL VENOUS BLD VENIPUNCTURE: CPT

## 2018-04-05 PROCEDURE — 85610 PROTHROMBIN TIME: CPT

## 2018-04-05 PROCEDURE — 85025 COMPLETE CBC W/AUTO DIFF WBC: CPT

## 2018-04-06 ENCOUNTER — ANTICOAGULATION VISIT (OUTPATIENT)
Dept: PHARMACY | Facility: HOSPITAL | Age: 33
End: 2018-04-06

## 2018-04-06 DIAGNOSIS — Z95.2 HX OF AORTIC VALVE REPLACEMENT, MECHANICAL: ICD-10-CM

## 2018-04-06 NOTE — PROGRESS NOTES
Anticoagulation Clinic - Remote Progress Note  REMOTE LAB    Indication: mechanical AVR (On-X)  Referring Provider: Latoya  Initial Warfarin Start Date:   Goal INR: 1.5-2.5  Current Drug Interactions: aspirin    Diet: one serving of something green once weekly (salads with raw spinach, asparagus -- avoids broccoli, etc)  Alcohol: none  Tobacco: none  OTC Pain Medication: APAP PRN    INR History:  Date 8/16 8/25 9/6 9/15 10/2 10/16 10/30 11/20 12/8   Total Weekly Dose 57.5 mg 57.5 mg 52.5 mg 37.5-45 mg 52.5 mg 52.5 mg 52.5 mg 45mg 55 mg   INR 2.76 2.77 2.70 1.7 1.55 2.04 2.93 1.42 2.55   Notes   fewer GLV miss dose x 2?  Thrive Thrive miss dose x 1 ill; Dex / Roceph IM inj.     Date 12/26 1/9 1/22 2/6 2/22 3/13 4/5     Total Weekly Dose 55mg 52.5 mg 52.5mg 52.5mg 52.5 mg 45 mg 50 mg 47.5 mg    INR 2.72 2.3 2.35 2.68 2.3 2.55 2.72     Notes flu; Tamiflu     1 miss        Phone Interview:  Tablet Strength: pt has 5mg tablets  Current Maintenance Dose: 7.5mg daily, except 5 mg on Tuesdays  Patient Contact Info: [phone]   Lab Contact Info:  [facility]    Patient Findings   Negatives Signs/symptoms of thrombosis, Signs/symptoms of bleeding, Laboratory test error suspected, Change in health, Change in alcohol use, Change in activity, Upcoming invasive procedure, Emergency department visit, Upcoming dental procedure, Missed doses, Extra doses, Change in medications, Change in diet/appetite, Hospital admission, Bruising, Other complaints   Comments Patient reports dose of warfarin 7.5 mg daily, except 5 mg on Tuesdays. Our records show that her 5 mg day was on Wednesdays. I updated the Anticoag Tracker to reflect how she has been taking warfarin.        Plan:  1. INR on 4/5 was slightly supratherapeutic again at 2.72 despite dose decrease at last appointment. Patient reports her dose as warfarin 7.5 mg daily, except 5 mg on Tuesdays (our records show 5 mg day is Wednesday - updated Anticoag Tracker). Instructed   Bret to decrease maintenance dose (5%) to warfarin 7.5mg daily except 5mg Tuesdays and Fridays.   2. Repeat INR in 2 weeks (4/19) to reassess.  3. Verbal information provided over the phone to Mrs. Queen. She RBV dosing instructions, expresses understanding with teach back, and she has no further questions at this time.    Missy Joshi RPH  4/6/2018  8:40 AM

## 2018-04-16 ENCOUNTER — LAB (OUTPATIENT)
Dept: LAB | Facility: HOSPITAL | Age: 33
End: 2018-04-16

## 2018-04-16 ENCOUNTER — ANTICOAGULATION VISIT (OUTPATIENT)
Dept: PHARMACY | Facility: HOSPITAL | Age: 33
End: 2018-04-16

## 2018-04-16 DIAGNOSIS — Z95.2 HISTORY OF MECHANICAL AORTIC VALVE REPLACEMENT: Primary | ICD-10-CM

## 2018-04-16 DIAGNOSIS — Z95.2 HISTORY OF MECHANICAL AORTIC VALVE REPLACEMENT: ICD-10-CM

## 2018-04-16 DIAGNOSIS — Z95.2 HX OF AORTIC VALVE REPLACEMENT, MECHANICAL: ICD-10-CM

## 2018-04-16 LAB
INR PPP: 2.57 (ref 0.9–1.1)
PROTHROMBIN TIME: 28 SECONDS (ref 11–15.4)

## 2018-04-16 PROCEDURE — 36415 COLL VENOUS BLD VENIPUNCTURE: CPT

## 2018-04-16 PROCEDURE — 85610 PROTHROMBIN TIME: CPT

## 2018-04-16 NOTE — PROGRESS NOTES
Anticoagulation Clinic - Remote Progress Note  REMOTE LAB    Indication: mechanical AVR (On-X)  Referring Provider: Latoya  Initial Warfarin Start Date:   Goal INR: 1.5-2.5  Current Drug Interactions: aspirin    Diet: one serving of something green once weekly (salads with raw spinach, asparagus -- avoids broccoli, etc)  Alcohol: none  Tobacco: none  OTC Pain Medication: APAP PRN    INR History:  Date 8/16 8/25 9/6 9/15 10/2 10/16 10/30 11/20 12/8   Total Weekly Dose 57.5 mg 57.5 mg 52.5 mg 37.5-45 mg 52.5 mg 52.5 mg 52.5 mg 45mg 55 mg   INR 2.76 2.77 2.70 1.7 1.55 2.04 2.93 1.42 2.55   Notes   fewer GLV miss dose x 2?  Thrive Thrive miss dose x 1 ill; Dex / Roceph IM inj.     Date 12/26 1/9 1/22 2/6 2/22 3/13 4/5 4/16    Total Weekly Dose 55mg 52.5 mg 52.5mg 52.5mg 52.5 mg 45 mg 50 mg 47.5 mg 45 mg   INR 2.72 2.3 2.35 2.68 2.3 2.55 2.72 2.57    Notes flu; Tamiflu     1 miss  2 miss      Phone Interview:  Tablet Strength: pt has 5mg tablets  Current Maintenance Dose: 7.5mg daily, except 5 mg on Tuesdays  Patient Contact Info: [phone]   Lab Contact Info: [facility]    Patient Findings   Positives Missed doses   Negatives Signs/symptoms of thrombosis, Signs/symptoms of bleeding, Laboratory test error suspected, Change in health, Change in alcohol use, Change in activity, Upcoming invasive procedure, Emergency department visit, Upcoming dental procedure, Extra doses, Change in medications, Change in diet/appetite, Hospital admission, Bruising, Other complaints   Comments Death in family or dear friend, that caused her to be distraught and she missed her warfarin over the prior weekend.     Plan:  1. INR on 4/16 was slightly supratherapeutic again at 2.57 despite dose decrease at last appointment and two missed doses over the previous weekend. Instructed Mrs. Queen to decrease maintenance dose (5%) to warfarin 7.5mg daily except 5mg Monday, Wednesdays and Fridays.   2. Repeat INR next week (4/24) to reassess.  Entered standing order for weekly PT/INR.  3. Verbal information provided over the phone to Mrs. Queen. She RBV dosing instructions, expresses understanding with teach back, and she has no further questions at this time.    Genaro Chu ODIN  4/16/2018  2:11 PM

## 2018-04-24 ENCOUNTER — ANTICOAGULATION VISIT (OUTPATIENT)
Dept: PHARMACY | Facility: HOSPITAL | Age: 33
End: 2018-04-24

## 2018-04-24 ENCOUNTER — LAB (OUTPATIENT)
Dept: LAB | Facility: HOSPITAL | Age: 33
End: 2018-04-24

## 2018-04-24 DIAGNOSIS — Z95.2 HX OF AORTIC VALVE REPLACEMENT, MECHANICAL: ICD-10-CM

## 2018-04-24 LAB
INR PPP: 2.34 (ref 0.9–1.1)
PROTHROMBIN TIME: 26 SECONDS (ref 11–15.4)

## 2018-04-24 PROCEDURE — 36415 COLL VENOUS BLD VENIPUNCTURE: CPT

## 2018-04-24 PROCEDURE — 85610 PROTHROMBIN TIME: CPT

## 2018-04-24 NOTE — PROGRESS NOTES
Anticoagulation Clinic - Remote Progress Note  REMOTE LAB    Indication: mechanical AVR (On-X)  Referring Provider: Latoya  Initial Warfarin Start Date:   Goal INR: 1.5-2.5  Current Drug Interactions: aspirin    Diet: one serving of something green once weekly (salads with raw spinach, asparagus -- avoids broccoli, etc)  Alcohol: none  Tobacco: none  OTC Pain Medication: APAP PRN    INR History:  Date 8/16 8/25 9/6 9/15 10/2 10/16 10/30 11/20 12/8   Total Weekly Dose 57.5 mg 57.5 mg 52.5 mg 37.5-45 mg 52.5 mg 52.5 mg 52.5 mg 45mg 55 mg   INR 2.76 2.77 2.70 1.7 1.55 2.04 2.93 1.42 2.55   Notes   fewer GLV miss dose x 2?  Thrive Thrive miss dose x 1 ill; Dex / Roceph IM inj.     Date 12/26 1/9 1/22 2/6 2/22 3/13 4/5 4/16 4/24   Total Weekly Dose 55mg 52.5 mg 52.5mg 52.5mg 52.5 mg 45 mg 50 mg 47.5 mg 45 mg   INR 2.72 2.3 2.35 2.68 2.3 2.55 2.72 2.57 2.34   Notes flu; Tamiflu     1 miss  2 miss      Phone Interview:  Tablet Strength: pt has 5mg tablets  Current Maintenance Dose: 7.5mg daily, except 5 mg on Tuesdays  Patient Contact Info: [phone]   Lab Contact Info: [facility]    Patient Findings:   Positives Change in health, Change in medications   Negatives Signs/symptoms of thrombosis, Signs/symptoms of bleeding, Laboratory test error suspected, Change in alcohol use, Change in activity, Upcoming invasive procedure, Emergency department visit, Upcoming dental procedure, Missed doses, Extra doses, Change in diet/appetite, Hospital admission, Bruising, Other complaints   Comments Patient was given both a Ceftriaxone and a Dexamethasone injection today for bronchitis. Patient was given a prescription for Azithromycin (she is unsure how many days because she has not picked it up from the pharmacy yet). However, patient was instructed not to take the Azithromycin if she woke up tomorrow morning feeling better. Patient will repeat INR on Friday, 4/27, and let us know then whether or not she started the  Azithromycin.      Plan:   1. INR is therapeutic today at 2.34. Instructed patient to continue maintenance dose of warfarin 7.5mg daily except 5mg  MWF for the time being.   2. Repeat INR on Friday, 4/27  3. Verbal information provided over the phone to patient, who expresses understanding with teach back and has no further questions at this time.     Roxana Laura, Pharmacy Technician  4/24/2018  2:39 PM     I, Carmelita Morales, PharmD, have reviewed the note in full and agree with the assessment and plan.  04/26/18  7:57 AM

## 2018-04-25 RX ORDER — WARFARIN SODIUM 5 MG/1
TABLET ORAL
Qty: 120 TABLET | Refills: 0 | Status: SHIPPED | OUTPATIENT
Start: 2018-04-25 | End: 2018-05-10 | Stop reason: SDUPTHER

## 2018-04-27 ENCOUNTER — ANTICOAGULATION VISIT (OUTPATIENT)
Dept: PHARMACY | Facility: HOSPITAL | Age: 33
End: 2018-04-27

## 2018-04-27 ENCOUNTER — LAB (OUTPATIENT)
Dept: LAB | Facility: HOSPITAL | Age: 33
End: 2018-04-27

## 2018-04-27 DIAGNOSIS — Z95.2 HX OF AORTIC VALVE REPLACEMENT, MECHANICAL: ICD-10-CM

## 2018-04-27 DIAGNOSIS — Z95.2 HX OF AORTIC VALVE REPLACEMENT, MECHANICAL: Primary | ICD-10-CM

## 2018-04-27 LAB
INR PPP: 2.37 (ref 0.9–1.1)
PROTHROMBIN TIME: 26.2 SECONDS (ref 11–15.4)

## 2018-04-27 PROCEDURE — 85610 PROTHROMBIN TIME: CPT

## 2018-04-27 PROCEDURE — 36415 COLL VENOUS BLD VENIPUNCTURE: CPT

## 2018-04-27 NOTE — PROGRESS NOTES
Anticoagulation Clinic - Remote Progress Note  REMOTE LAB    Indication: mechanical AVR (On-X)  Referring Provider: Latoya  Initial Warfarin Start Date:   Goal INR: 1.5-2.5  Current Drug Interactions: aspirin    Diet: one serving of something green once weekly (salads with raw spinach, asparagus -- avoids broccoli, etc)  Alcohol: none  Tobacco: none  OTC Pain Medication: APAP PRN    INR History:  Date 8/16 8/25 9/6 9/15 10/2 10/16 10/30 11/20 12/8   Total Weekly Dose 57.5 mg 57.5 mg 52.5 mg 37.5-45 mg 52.5 mg 52.5 mg 52.5 mg 45mg 55 mg   INR 2.76 2.77 2.70 1.7 1.55 2.04 2.93 1.42 2.55   Notes   fewer GLV miss dose x 2?  Thrive Thrive miss dose x 1 ill; dex / Roceph IM inj.     Date 12/26 1/9 1/22 2/6 2/22 3/13 4/5 4/16 4/24   Total Weekly Dose 55mg 52.5 mg 52.5mg 52.5mg 52.5 mg 45 mg 50 mg 47.5 mg 45 mg   INR 2.72 2.3 2.35 2.68 2.3 2.55 2.72 2.57 2.34   Notes flu; Tamiflu     1 miss  2 miss ill; dex / Roceph IM inj.     Date 4/27         Total Weekly Dose 45mg         INR 2.37         Notes            Phone Interview:  Tablet Strength: pt has 5mg tablets  Current Maintenance Dose: 7.5mg daily, except 5 mg on Tuesdays  Patient Contact Info: [phone]   Lab Contact Info: [facility]    Patient Findings   Positives Change in health   Negatives Signs/symptoms of thrombosis, Signs/symptoms of bleeding, Laboratory test error suspected, Change in alcohol use, Change in activity, Upcoming invasive procedure, Emergency department visit, Upcoming dental procedure, Missed doses, Extra doses, Change in medications, Change in diet/appetite, Hospital admission, Bruising, Other complaints   Comments Patient was diagnosed with bronchitis this week but reports that she did not take the prescribed azithromycin      Plan:   1. INR is therapeutic today at 2.37. Instructed patient to continue maintenance dose of warfarin 7.5mg daily except 5mg  MWF.  2. Repeat INR in 1.5 weeks. (5/9)  3. Verbal information provided over the phone  to patient, who expresses understanding with teach back and has no further questions at this time.   4. Patient declines the need for warfarin refills at this time.     Roxana Laura, Pharmacy Technician  4/27/2018  1:05 PM     IGrecia, [facility], have reviewed the note in full and agree with the assessment and plan.  04/27/18  5:05 PM

## 2018-05-01 ENCOUNTER — TELEPHONE (OUTPATIENT)
Dept: PHARMACY | Facility: HOSPITAL | Age: 33
End: 2018-05-01

## 2018-05-02 ENCOUNTER — LAB (OUTPATIENT)
Dept: LAB | Facility: HOSPITAL | Age: 33
End: 2018-05-02

## 2018-05-02 DIAGNOSIS — Z95.2 HX OF AORTIC VALVE REPLACEMENT, MECHANICAL: ICD-10-CM

## 2018-05-02 LAB
INR PPP: 2.03 (ref 0.9–1.1)
PROTHROMBIN TIME: 23.2 SECONDS (ref 11–15.4)

## 2018-05-02 PROCEDURE — 36415 COLL VENOUS BLD VENIPUNCTURE: CPT

## 2018-05-02 PROCEDURE — 85610 PROTHROMBIN TIME: CPT

## 2018-05-03 ENCOUNTER — ANTICOAGULATION VISIT (OUTPATIENT)
Dept: PHARMACY | Facility: HOSPITAL | Age: 33
End: 2018-05-03

## 2018-05-03 DIAGNOSIS — Z95.2 HX OF AORTIC VALVE REPLACEMENT, MECHANICAL: ICD-10-CM

## 2018-05-03 NOTE — PROGRESS NOTES
Anticoagulation Clinic - Remote Progress Note  REMOTE LAB    Indication: mechanical AVR (On-X)  Referring Provider: Latoya  Initial Warfarin Start Date:   Goal INR: 1.5-2.5  Current Drug Interactions: aspirin    Diet: one serving of something green once weekly (salads with raw spinach, asparagus -- avoids broccoli, etc)  Alcohol: none  Tobacco: none  OTC Pain Medication: APAP PRN    INR History:  Date 8/16 8/25 9/6 9/15 10/2 10/16 10/30 11/20 12/8   Total Weekly Dose 57.5 mg 57.5 mg 52.5 mg 37.5-45 mg 52.5 mg 52.5 mg 52.5 mg 45mg 55 mg   INR 2.76 2.77 2.70 1.7 1.55 2.04 2.93 1.42 2.55   Notes   fewer GLV miss dose x 2?  Thrive Thrive miss dose x 1 ill; dex / Roceph IM inj.     Date 12/26 1/9 1/22 2/6 2/22 3/13 4/5 4/16 4/24   Total Weekly Dose 55mg 52.5 mg 52.5mg 52.5mg 52.5 mg 45 mg 50 mg 47.5 mg 45 mg   INR 2.72 2.3 2.35 2.68 2.3 2.55 2.72 2.57 2.34   Notes flu; Tamiflu     1 miss  2 miss ill; dex / Roceph IM inj.     Date 4/27 5/2        Total Weekly Dose 45mg 45mg        INR 2.37 2.03        Notes  Zpak          Phone Interview:  Tablet Strength: pt has 5mg tablets  Current Maintenance Dose: 7.5mg daily, except 5 mg on Tuesdays  Patient Contact Info: [phone]   Lab Contact Info:  [facility]    Patient Findings:  Positives Change in medications   Negatives Signs/symptoms of thrombosis, Signs/symptoms of bleeding, Laboratory test error suspected, Change in health, Change in alcohol use, Change in activity, Upcoming invasive procedure, Emergency department visit, Upcoming dental procedure, Missed doses, Extra doses, Change in diet/appetite, Hospital admission, Bruising, Other complaints   Comments Mrs. Queen finished her Zpak but otherwise denies recent changes.     Plan:   1. INR is therapeutic today at 2.03 despite recent abx. Instructed patient to continue maintenance dose of warfarin 7.5mg daily except 5mg MWF.  2. Repeat INR in 2 weeks. (5/16)  3. Verbal information provided over the phone. Mrs. Queen  expresses understanding with teach back and has no further questions at this time.   4. Patient declines the need for warfarin refills at this time.     Delphine You, Pharmacy Technician  5/3/2018  9:27 AM     I, Grecia Segovia [facility], have reviewed the note in full and agree with the assessment and plan.  05/03/18  12:54 PM

## 2018-05-10 RX ORDER — WARFARIN SODIUM 5 MG/1
TABLET ORAL
Qty: 135 TABLET | Refills: 0 | Status: SHIPPED | OUTPATIENT
Start: 2018-05-10 | End: 2018-06-08 | Stop reason: SDUPTHER

## 2018-05-18 ENCOUNTER — LAB (OUTPATIENT)
Dept: LAB | Facility: HOSPITAL | Age: 33
End: 2018-05-18

## 2018-05-18 ENCOUNTER — ANTICOAGULATION VISIT (OUTPATIENT)
Dept: PHARMACY | Facility: HOSPITAL | Age: 33
End: 2018-05-18

## 2018-05-18 DIAGNOSIS — Z95.2 HX OF AORTIC VALVE REPLACEMENT, MECHANICAL: ICD-10-CM

## 2018-05-18 LAB
INR PPP: 2.05 (ref 0.9–1.1)
PROTHROMBIN TIME: 23.4 SECONDS (ref 11–15.4)

## 2018-05-18 PROCEDURE — 36415 COLL VENOUS BLD VENIPUNCTURE: CPT

## 2018-05-18 PROCEDURE — 85610 PROTHROMBIN TIME: CPT

## 2018-05-18 NOTE — PROGRESS NOTES
Anticoagulation Clinic - Remote Progress Note  REMOTE LAB    Indication: mechanical AVR (On-X)  Referring Provider: Latoya  Initial Warfarin Start Date:   Goal INR: 1.5-2.5  Current Drug Interactions: aspirin    Diet: one serving of something green once weekly (salads with raw spinach, asparagus -- avoids broccoli, etc)  Alcohol: none  Tobacco: none  OTC Pain Medication: APAP PRN    INR History:  Date 8/16 8/25 9/6 9/15 10/2 10/16 10/30 11/20 12/8   Total Weekly Dose 57.5 mg 57.5 mg 52.5 mg 37.5-45 mg 52.5 mg 52.5 mg 52.5 mg 45mg 55 mg   INR 2.76 2.77 2.70 1.7 1.55 2.04 2.93 1.42 2.55   Notes   fewer GLV miss dose x 2?  Thrive Thrive miss dose x 1 ill; dex / Roceph IM inj.     Date 12/26 1/9 1/22 2/6 2/22 3/13 4/5 4/16 4/24   Total Weekly Dose 55mg 52.5 mg 52.5mg 52.5mg 52.5 mg 45 mg 50 mg 47.5 mg 45 mg   INR 2.72 2.3 2.35 2.68 2.3 2.55 2.72 2.57 2.34   Notes flu; Tamiflu     1 miss  2 miss ill; dex / Roceph IM inj.     Date 4/27 5/2 5/18       Total Weekly Dose 45mg 45mg 45mg       INR 2.37 2.03 2.05       Notes  Zpak          Phone Interview:  Tablet Strength: pt has 5mg tablets  Current Maintenance Dose: 7.5mg daily, except 5 mg on Tuesdays  Patient Contact Info: [phone]   Lab Contact Info: [facility]  Patient Findings   Negatives:   Signs/symptoms of thrombosis, Signs/symptoms of bleeding, Laboratory test error suspected, Change in health, Change in alcohol use, Change in activity, Upcoming invasive procedure, Emergency department visit, Upcoming dental procedure, Missed doses, Extra doses, Change in medications, Change in diet/appetite, Hospital admission, Bruising, Other complaints         Plan:   1. INR is therapeutic today at 2.05. Instructed patient to continue maintenance dose of warfarin 7.5mg daily except 5mg MWF.  2. Repeat INR in 3 weeks on 6/8.  3. Verbal information provided over the phone. Mrs. Queen expresses understanding with teach back and has no further questions at this time.   4.  Patient declines the need for warfarin refills at this time.     Carmelita Morales, PharmD  5/18/2018  2:02 PM

## 2018-06-08 ENCOUNTER — LAB (OUTPATIENT)
Dept: LAB | Facility: HOSPITAL | Age: 33
End: 2018-06-08

## 2018-06-08 ENCOUNTER — ANTICOAGULATION VISIT (OUTPATIENT)
Dept: PHARMACY | Facility: HOSPITAL | Age: 33
End: 2018-06-08

## 2018-06-08 DIAGNOSIS — Z95.2 HX OF AORTIC VALVE REPLACEMENT, MECHANICAL: ICD-10-CM

## 2018-06-08 LAB
INR PPP: 2.16 (ref 0.9–1.1)
PROTHROMBIN TIME: 24.3 SECONDS (ref 11–15.4)

## 2018-06-08 PROCEDURE — 85610 PROTHROMBIN TIME: CPT

## 2018-06-08 PROCEDURE — 36415 COLL VENOUS BLD VENIPUNCTURE: CPT

## 2018-06-08 RX ORDER — WARFARIN SODIUM 5 MG/1
TABLET ORAL
Qty: 135 TABLET | Refills: 0 | OUTPATIENT
Start: 2018-06-08 | End: 2018-12-14 | Stop reason: SDUPTHER

## 2018-06-08 NOTE — PROGRESS NOTES
Anticoagulation Clinic - Remote Progress Note  REMOTE LAB    Indication: mechanical AVR (On-X)  Referring Provider: Latoya  Initial Warfarin Start Date:   Goal INR: 1.5-2.5  Current Drug Interactions: aspirin    Diet: one serving of something green once weekly (salads with raw spinach, asparagus -- avoids broccoli, etc)  Alcohol: none  Tobacco: none  OTC Pain Medication: APAP PRN    INR History:  Date 8/16 8/25 9/6 9/15 10/2 10/16 10/30 11/20 12/8   Total Weekly Dose 57.5 mg 57.5 mg 52.5 mg 37.5-45 mg 52.5 mg 52.5 mg 52.5 mg 45mg 55mg   INR 2.76 2.77 2.70 1.7 1.55 2.04 2.93 1.42 2.55   Notes   fewer GLV miss dose x 2?  Thrive Thrive miss dose x 1 ill; dex / Roceph IM inj.     Date 12/26 1/9/18 1/22 2/6 2/22 3/13 4/5 4/16 4/24   Total Weekly Dose 55mg 52.5 mg 52.5mg 52.5mg 52.5 mg 45mg 50mg 47.5 mg 45mg   INR 2.72 2.3 2.35 2.68 2.3 2.55 2.72 2.57 2.34   Notes flu; Tamiflu     1 miss  2 miss ill; dex / Roceph IM inj.     Date 4/27 5/2 5/18 6/8       Total Weekly Dose 45mg 45mg 45mg 45mg       INR 2.37 2.03 2.05 2.16       Notes  Zpak           Phone Interview:  Tablet Strength: 5mg tablets  Current Maintenance Dose: 7.5mg daily except 5mg MonWedFri  Patient Contact Info: [phone]   Lab Contact Info: [facility]    Patient Findings   Negatives:   Signs/symptoms of thrombosis, Signs/symptoms of bleeding, Laboratory test error suspected, Change in health, Change in alcohol use, Change in activity, Upcoming invasive procedure, Emergency department visit, Upcoming dental procedure, Missed doses, Extra doses, Change in medications, Change in diet/appetite, Hospital admission, Bruising, Other complaints     Plan:   1. INR is therapeutic today at 2.16. Instructed patient to continue maintenance dose of warfarin 7.5mg daily except 5mg MonWedFri.  2. Repeat INR in 4 weeks, 7/6  3. Verbal information provided over the phone. Pt RBV dosing instructions, expresses understanding by teach back, and has no further questions at  this time.  4. Patient requests refills of her warfarin 5mg tablets be called in to [facility] in [place], [place]. Separate encounter created for refill request.    Genaro Rodriguez, [place]  6/8/2018  1:52 PM    ICarmelita, PharmD, have reviewed the note in full and agree with the assessment and plan.  06/08/18  4:53 PM

## 2018-07-09 ENCOUNTER — LAB (OUTPATIENT)
Dept: LAB | Facility: HOSPITAL | Age: 33
End: 2018-07-09

## 2018-07-09 ENCOUNTER — ANTICOAGULATION VISIT (OUTPATIENT)
Dept: PHARMACY | Facility: HOSPITAL | Age: 33
End: 2018-07-09

## 2018-07-09 DIAGNOSIS — Z95.2 HX OF AORTIC VALVE REPLACEMENT, MECHANICAL: ICD-10-CM

## 2018-07-09 LAB
INR PPP: 1.83 (ref 0.9–1.1)
PROTHROMBIN TIME: 21.4 SECONDS (ref 11–15.4)

## 2018-07-09 PROCEDURE — 85610 PROTHROMBIN TIME: CPT

## 2018-07-09 PROCEDURE — 36415 COLL VENOUS BLD VENIPUNCTURE: CPT

## 2018-07-09 NOTE — PROGRESS NOTES
Anticoagulation Clinic - Remote Progress Note  REMOTE LAB    Indication: mechanical AVR (On-X)  Referring Provider: Latoya  Initial Warfarin Start Date:   Goal INR: 1.5-2.5  Current Drug Interactions: aspirin    Diet: one serving of something green once weekly (salads with raw spinach, asparagus -- avoids broccoli, etc)  Alcohol: none  Tobacco: none  OTC Pain Medication: APAP PRN    INR History:  Date 8/16 8/25 9/6 9/15 10/2 10/16 10/30 11/20 12/8   Total Weekly Dose 57.5 mg 57.5 mg 52.5 mg 37.5-45 mg 52.5 mg 52.5 mg 52.5 mg 45mg 55mg   INR 2.76 2.77 2.70 1.7 1.55 2.04 2.93 1.42 2.55   Notes   fewer GLV miss dose x 2?  Thrive Thrive miss dose x 1 ill; dex / Roceph IM inj.     Date 12/26 1/9/18 1/22 2/6 2/22 3/13 4/5 4/16 4/24   Total Weekly Dose 55mg 52.5 mg 52.5mg 52.5mg 52.5 mg 45mg 50mg 47.5 mg 45mg   INR 2.72 2.3 2.35 2.68 2.3 2.55 2.72 2.57 2.34   Notes flu; Tamiflu     1 miss  2 miss ill; dex / Roceph IM inj.     Date 4/27 5/2 5/18 6/8 7/9      Total Weekly Dose 45mg 45mg 45mg 45mg 45mg      INR 2.37 2.03 2.05 2.16 1.83      Notes  Zpak           Phone Interview:  Tablet Strength: 5mg tablets  Current Maintenance Dose: 7.5mg daily except 5mg MonWedFri  Patient Contact Info: [phone]   Lab Contact Info: [facility]    Patient Findings:  Negatives:   Signs/symptoms of thrombosis, Signs/symptoms of bleeding, Laboratory test error suspected, Change in health, Change in alcohol use, Change in activity, Upcoming invasive procedure, Emergency department visit, Upcoming dental procedure, Missed doses, Extra doses, Change in medications, Change in diet/appetite, Hospital admission, Bruising, Other complaints   Comments:   Spoke with patient who denies any changes since last encounter     Plan:   1. INR is therapeutic today at 1.83. Instructed patient to continue maintenance dose of warfarin 7.5mg daily except 5mg MonWedFri.  2. Repeat INR in 4 weeks, 8/6.  3. Verbal information provided over the phone. Patient  RBV dosing instructions, expresses understanding by teach back, and has no further questions at this time.    Delphine You, [place]  7/9/2018  2:17 PM     I, Missy Joshi [facility], have reviewed the note in full and agree with the assessment and plan.  07/09/18  5:08 PM

## 2018-08-02 ENCOUNTER — LAB (OUTPATIENT)
Dept: LAB | Facility: HOSPITAL | Age: 33
End: 2018-08-02

## 2018-08-02 ENCOUNTER — ANTICOAGULATION VISIT (OUTPATIENT)
Dept: PHARMACY | Facility: HOSPITAL | Age: 33
End: 2018-08-02

## 2018-08-02 DIAGNOSIS — Z95.2 HX OF AORTIC VALVE REPLACEMENT, MECHANICAL: ICD-10-CM

## 2018-08-02 LAB
INR PPP: 2.84 (ref 0.9–1.1)
PROTHROMBIN TIME: 30.1 SECONDS (ref 11–15.4)

## 2018-08-02 PROCEDURE — 36415 COLL VENOUS BLD VENIPUNCTURE: CPT

## 2018-08-02 PROCEDURE — 85610 PROTHROMBIN TIME: CPT

## 2018-08-02 NOTE — PROGRESS NOTES
Anticoagulation Clinic - Remote Progress Note  REMOTE LAB    Indication: mechanical AVR (On-X)  Referring Provider: Latoya  Initial Warfarin Start Date:   Goal INR: 1.5-2.5  Current Drug Interactions: aspirin    Diet: one serving of something green once weekly (salads with raw spinach, asparagus -- avoids broccoli, etc)  Alcohol: none  Tobacco: none  OTC Pain Medication: APAP PRN    INR History:  Date 8/16 8/25 9/6 9/15 10/2 10/16 10/30 11/20 12/8   Total Weekly Dose 57.5 mg 57.5 mg 52.5 mg 37.5-45 mg 52.5 mg 52.5 mg 52.5 mg 45mg 55mg   INR 2.76 2.77 2.70 1.7 1.55 2.04 2.93 1.42 2.55   Notes   fewer GLV miss dose x 2?  Thrive Thrive miss dose x 1 ill; dex / Roceph IM inj.     Date 12/26 1/9/18 1/22 2/6 2/22 3/13 4/5 4/16 4/24   Total Weekly Dose 55mg 52.5 mg 52.5mg 52.5mg 52.5 mg 45mg 50mg 47.5 mg 45mg   INR 2.72 2.3 2.35 2.68 2.3 2.55 2.72 2.57 2.34   Notes flu; Tamiflu     1 miss  2 miss ill; dex / Roceph IM inj.     Date 4/27 5/2 5/18 6/8 7/9 8/2      Total Weekly Dose 45mg 45mg 45mg 45mg 45mg 45mg      INR 2.37 2.03 2.05 2.16 1.83 2.84      Notes  Zpak            Phone Interview:  Tablet Strength: 5mg tablets  Current Maintenance Dose: 7.5mg daily except 5mg MonWedFri  Patient Contact Info: [phone]   Lab Contact Info: [facility]    Patient Findings   Positives:   Upcoming dental procedure   Negatives:   Signs/symptoms of thrombosis, Signs/symptoms of bleeding, Laboratory test error suspected, Change in health, Change in alcohol use, Change in activity, Upcoming invasive procedure, Emergency department visit, Missed doses, Extra doses, Change in medications, Change in diet/appetite, Hospital admission, Bruising, Other complaints   Comments:   Has a dental cleaning appt on 8/9. Will take abx beforehand but isn't sure which one at this time, will call and let us know when she finds out.     Plan:   1. INR is SUPRAtherapeutic today at 2.84. After consulting with Grecia Segovia, PharmD, instructed patient to  decrease tonight's dose to 5mg and otherwise continue maintenance dose of warfarin 7.5mg daily except 5mg MonWedFri.  2. Repeat INR in 2 weeks, 8/16.  3. Verbal information provided over the phone. Patient RBV dosing instructions, expresses understanding by teach back, and has no further questions at this time.    Genaro Rodriguez CPhT  8/2/2018  3:24 PM    I, Grecia Segovia, [facility], have reviewed the note in full and agree with the assessment and plan.  08/05/18  7:55 AM

## 2018-08-16 ENCOUNTER — ANTICOAGULATION VISIT (OUTPATIENT)
Dept: PHARMACY | Facility: HOSPITAL | Age: 33
End: 2018-08-16

## 2018-08-16 ENCOUNTER — LAB (OUTPATIENT)
Dept: LAB | Facility: HOSPITAL | Age: 33
End: 2018-08-16

## 2018-08-16 DIAGNOSIS — Z95.2 HX OF AORTIC VALVE REPLACEMENT, MECHANICAL: ICD-10-CM

## 2018-08-16 LAB
INR PPP: 2.26 (ref 0.9–1.1)
PROTHROMBIN TIME: 25.2 SECONDS (ref 11–15.4)

## 2018-08-16 PROCEDURE — 85610 PROTHROMBIN TIME: CPT

## 2018-08-16 PROCEDURE — 36415 COLL VENOUS BLD VENIPUNCTURE: CPT

## 2018-08-16 NOTE — PROGRESS NOTES
Anticoagulation Clinic - Remote Progress Note  REMOTE LAB    Indication: mechanical AVR (On-X)  Referring Provider: Latoya  Initial Warfarin Start Date:   Goal INR: 1.5-2.5  Current Drug Interactions: aspirin    Diet: one serving of something green once weekly (salads with raw spinach, asparagus -- avoids broccoli, etc)  Alcohol: none  Tobacco: none  OTC Pain Medication: APAP PRN    INR History:  Date 8/16 8/25 9/6 9/15 10/2 10/16 10/30 11/20 12/8   Total Weekly Dose 57.5 mg 57.5 mg 52.5 mg 37.5-45 mg 52.5 mg 52.5 mg 52.5 mg 45mg 55mg   INR 2.76 2.77 2.70 1.7 1.55 2.04 2.93 1.42 2.55   Notes   fewer GLV miss dose x 2?  Thrive Thrive miss dose x 1 ill; dex / Roceph IM inj.     Date 12/26 1/9/18 1/22 2/6 2/22 3/13 4/5 4/16 4/24   Total Weekly Dose 55mg 52.5 mg 52.5mg 52.5mg 52.5 mg 45mg 50mg 47.5 mg 45mg   INR 2.72 2.3 2.35 2.68 2.3 2.55 2.72 2.57 2.34   Notes flu; Tamiflu     1 miss  2 miss ill; dex / Roceph IM inj.     Date 4/27 5/2 5/18 6/8 7/9 8/2 8/16     Total Weekly Dose 45mg 45mg 45mg 45mg 45mg 45mg 45mg     INR 2.37 2.03 2.05 2.16 1.83 2.84 2.26     Notes  Zpak            Phone Interview:  Tablet Strength: 5mg tablets  Current Maintenance Dose: 7.5mg daily except 5mg MonWedFri  Patient Contact Info: [phone]   Lab Contact Info: [facility]    Patient Findings:  Positives:   Change in medications   Negatives:   Signs/symptoms of thrombosis, Signs/symptoms of bleeding, Laboratory test error suspected, Change in health, Change in alcohol use, Change in activity, Upcoming invasive procedure, Emergency department visit, Upcoming dental procedure, Missed doses, Extra doses, Change in diet/appetite, Hospital admission, Bruising, Other complaints   Comments:   Patient took an antibiotic as a one time dose prior to her dental cleaning last week, patient does not remember name of medication. Otherwise, no changes.     Plan:   1. INR is back WNL today at 2.26. Instructed patient to continue maintenance dose of  warfarin 7.5mg daily except 5mg MonWedFri.  2. Repeat INR in two weeks, 8/30.  3. Verbal information provided over the phone. Patient RBV dosing instructions, expresses understanding by teach back, and has no further questions at this time.    Delphine You CPhT  8/16/2018  4:12 PM    I, Genaro Chu [facility], have reviewed the note in full and agree with the assessment and plan.  08/16/18  4:24 PM

## 2018-08-30 ENCOUNTER — LAB (OUTPATIENT)
Dept: LAB | Facility: HOSPITAL | Age: 33
End: 2018-08-30

## 2018-08-30 ENCOUNTER — ANTICOAGULATION VISIT (OUTPATIENT)
Dept: PHARMACY | Facility: HOSPITAL | Age: 33
End: 2018-08-30

## 2018-08-30 DIAGNOSIS — Z95.2 HX OF AORTIC VALVE REPLACEMENT, MECHANICAL: ICD-10-CM

## 2018-08-30 LAB
INR PPP: 2.38 (ref 0.9–1.1)
PROTHROMBIN TIME: 26.2 SECONDS (ref 11–15.4)

## 2018-08-30 PROCEDURE — 36415 COLL VENOUS BLD VENIPUNCTURE: CPT

## 2018-08-30 PROCEDURE — 85610 PROTHROMBIN TIME: CPT

## 2018-08-30 NOTE — PROGRESS NOTES
Anticoagulation Clinic - Remote Progress Note  REMOTE LAB    Indication: mechanical AVR (On-X)  Referring Provider: Latoya  Initial Warfarin Start Date:   Goal INR: 1.5-2.5  Current Drug Interactions: aspirin    Diet: one serving of something green once weekly (salads with raw spinach, asparagus -- avoids broccoli, etc)  Alcohol: none  Tobacco: none  OTC Pain Medication: APAP PRN    INR History:  Date 8/16 8/25 9/6 9/15 10/2 10/16 10/30 11/20 12/8   Total Weekly Dose 57.5 mg 57.5 mg 52.5 mg 37.5-45 mg 52.5 mg 52.5 mg 52.5 mg 45mg 55mg   INR 2.76 2.77 2.70 1.7 1.55 2.04 2.93 1.42 2.55   Notes   fewer GLV miss dose x 2?  Thrive Thrive miss dose x 1 ill; dex / Roceph IM inj.     Date 12/26 1/9/18 1/22 2/6 2/22 3/13 4/5 4/16 4/24   Total Weekly Dose 55mg 52.5 mg 52.5mg 52.5mg 52.5 mg 45mg 50mg 47.5 mg 45mg   INR 2.72 2.3 2.35 2.68 2.3 2.55 2.72 2.57 2.34   Notes flu; Tamiflu     1 miss  2 miss ill; dex / Roceph IM inj.     Date 4/27 5/2 5/18 6/8 7/9 8/2 8/16 8/30    Total Weekly Dose 45mg 45mg 45mg 45mg 45mg 45mg 45mg 45mg    INR 2.37 2.03 2.05 2.16 1.83 2.84 2.26 2.38    Notes  Zpak            Phone Interview:  Tablet Strength: 5mg tablets  Current Maintenance Dose: 7.5mg daily except 5mg MonWedFri  Patient Contact Info: [phone]   Lab Contact Info: [facility]    Patient Findings   Negatives:   Signs/symptoms of thrombosis, Signs/symptoms of bleeding, Laboratory test error suspected, Change in health, Change in alcohol use, Change in activity, Upcoming invasive procedure, Emergency department visit, Upcoming dental procedure, Missed doses, Extra doses, Change in medications, Change in diet/appetite, Hospital admission, Bruising, Other complaints     Plan:   1. INR is therapeutic today at 2.38. Instructed patient to eat a very small serving of GLV (suggested iceberg lettuce salad) and then continue maintenance dose of warfarin 7.5mg daily except 5mg MonWedFri.  2. Repeat INR in 4 weeks, 9/27  3. Verbal  information provided over the phone. Patient RBV dosing instructions, expresses understanding by teach back, and has no further questions at this time.    Genaro Rodriguez CPhT  8/30/2018  11:36 AM    I, Sarah Zavala [facility], have reviewed the note in full and agree with the assessment and plan.  Note goal of 1.5 - 2.5  08/30/18  2:24 PM

## 2018-10-01 ENCOUNTER — ANTICOAGULATION VISIT (OUTPATIENT)
Dept: PHARMACY | Facility: HOSPITAL | Age: 33
End: 2018-10-01

## 2018-10-01 ENCOUNTER — LAB (OUTPATIENT)
Dept: LAB | Facility: HOSPITAL | Age: 33
End: 2018-10-01

## 2018-10-01 DIAGNOSIS — Z95.2 HX OF AORTIC VALVE REPLACEMENT, MECHANICAL: ICD-10-CM

## 2018-10-01 LAB
INR PPP: 3.03 (ref 0.9–1.1)
PROTHROMBIN TIME: 31.7 SECONDS (ref 11–15.4)

## 2018-10-01 PROCEDURE — 85610 PROTHROMBIN TIME: CPT

## 2018-10-01 PROCEDURE — 36415 COLL VENOUS BLD VENIPUNCTURE: CPT

## 2018-10-01 NOTE — PROGRESS NOTES
Anticoagulation Clinic - Remote Progress Note  REMOTE LAB    Indication: mechanical AVR (On-X)  Referring Provider: Latoya  Initial Warfarin Start Date:   Goal INR: 1.5-2.5  Current Drug Interactions: aspirin    Diet: one serving of something green once weekly (salads with raw spinach, asparagus -- avoids broccoli, etc)  Alcohol: none  Tobacco: none  OTC Pain Medication: APAP PRN    INR History:  Date 8/16 8/25 9/6 9/15 10/2 10/16 10/30 11/20 12/8   Total Weekly Dose 57.5 mg 57.5 mg 52.5 mg 37.5-45 mg 52.5 mg 52.5 mg 52.5 mg 45mg 55mg   INR 2.76 2.77 2.70 1.7 1.55 2.04 2.93 1.42 2.55   Notes   fewer GLV miss dose x 2?  Thrive Thrive miss dose x 1 ill; dex / Roceph IM inj.     Date 12/26 1/9/18 1/22 2/6 2/22 3/13 4/5 4/16 4/24   Total Weekly Dose 55mg 52.5 mg 52.5mg 52.5mg 52.5 mg 45mg 50mg 47.5 mg 45mg   INR 2.72 2.3 2.35 2.68 2.3 2.55 2.72 2.57 2.34   Notes flu; Tamiflu     1 miss  2 miss ill; dex / Roceph IM inj.     Date 4/27 5/2 5/18 6/8 7/9 8/2 8/16 8/30  10/1   Total Weekly Dose 45mg 45mg 45mg 45mg 45mg 45mg 45mg 45mg  45 mg   INR 2.37 2.03 2.05 2.16 1.83 2.84 2.26 2.38  3.03   Notes  Zpak            Phone Interview:  Tablet Strength: 5mg tablets  Current Maintenance Dose: 7.5mg daily except 5mg MonWedFri  Patient Contact Info: [phone]   Lab Contact Info: [facility]    Patient Findings   Positives:   Change in diet/appetite   Negatives:   Signs/symptoms of thrombosis, Signs/symptoms of bleeding, Laboratory test error suspected, Change in health, Change in alcohol use, Change in activity, Upcoming invasive procedure, Emergency department visit, Upcoming dental procedure, Missed doses, Extra doses, Change in medications, Hospital admission, Bruising, Other complaints   Comments:   She stated that she doesn't really keep track of her intake of GLV like she should.  She stated that she usually has a serving of green beans or salad weekly.   Otherwise, denied changes.       Plan:      1. INR is SUPRA  therapeutic today at 3.03.  Her goal is 1.5 - 2.5.  Instructed patient to eat a very small serving of GLV (suggested iceberg lettuce salad) tonight and take warfarin 5 mg dose tomorrow, then continue maintenance dose of warfarin 7.5mg daily except 5mg MonWedFri.  2. Repeat INR on Monday, 10/8 as she has a doctor's appointment that day anyway.  3. Verbal information provided over the phone. Patient RBV dosing instructions, expresses understanding by teach back, and has no further questions at this time.    Sarah Zavala, PharmD  10/1/2018  10:02 AM

## 2018-10-08 ENCOUNTER — LAB (OUTPATIENT)
Dept: LAB | Facility: HOSPITAL | Age: 33
End: 2018-10-08

## 2018-10-08 ENCOUNTER — ANTICOAGULATION VISIT (OUTPATIENT)
Dept: PHARMACY | Facility: HOSPITAL | Age: 33
End: 2018-10-08

## 2018-10-08 DIAGNOSIS — Z95.2 HX OF AORTIC VALVE REPLACEMENT, MECHANICAL: ICD-10-CM

## 2018-10-08 LAB
INR PPP: 3.35 (ref 0.9–1.1)
PROTHROMBIN TIME: 34.2 SECONDS (ref 11–15.4)

## 2018-10-08 PROCEDURE — 36415 COLL VENOUS BLD VENIPUNCTURE: CPT

## 2018-10-08 PROCEDURE — 85610 PROTHROMBIN TIME: CPT

## 2018-10-08 NOTE — PROGRESS NOTES
Anticoagulation Clinic - Remote Progress Note  REMOTE LAB    Indication: mechanical AVR (On-X)  Referring Provider: Latoya  Initial Warfarin Start Date:   Goal INR: 1.5-2.5  Current Drug Interactions: aspirin    Diet: one serving of something green once weekly (salads with raw spinach, asparagus -- avoids broccoli, etc)  Alcohol: none  Tobacco: none  OTC Pain Medication: APAP PRN    INR History:  Date 8/16 8/25 9/6 9/15 10/2 10/16 10/30 11/20 12/8   Total Weekly Dose 57.5 mg 57.5 mg 52.5 mg 37.5-45 mg 52.5 mg 52.5 mg 52.5 mg 45mg 55mg   INR 2.76 2.77 2.70 1.7 1.55 2.04 2.93 1.42 2.55   Notes   fewer GLV miss dose x 2?  Thrive Thrive miss dose x 1 ill; dex / Roceph IM inj.     Date 12/26 1/9/18 1/22 2/6 2/22 3/13 4/5 4/16 4/24   Total Weekly Dose 55mg 52.5 mg 52.5mg 52.5mg 52.5 mg 45mg 50mg 47.5 mg 45mg   INR 2.72 2.3 2.35 2.68 2.3 2.55 2.72 2.57 2.34   Notes flu; Tamiflu     1 miss  2 miss ill; dex / Roceph IM inj.     Date 4/27 5/2 5/18 6/8 7/9 8/2 8/16 8/30  10/1 10/8 10/12   Total Weekly Dose 45mg 45mg 45mg 45mg 45mg 45mg 45mg 45mg  45 mg 42.5mg 40mg   INR 2.37 2.03 2.05 2.16 1.83 2.84 2.26 2.38  3.03 3.35    Notes  Zpak        x1 reduced dose      Phone Interview:  Tablet Strength: 5mg tablets  Current Maintenance Dose: 7.5mg daily except 5mg MonWedFri  Patient Contact Info: [phone]   Lab Contact Info: [facility]    Patient Findings   Negatives:   Signs/symptoms of thrombosis, Signs/symptoms of bleeding, Laboratory test error suspected, Change in health, Change in alcohol use, Change in activity, Upcoming invasive procedure, Emergency department visit, Upcoming dental procedure, Missed doses, Extra doses, Change in medications, Change in diet/appetite, Hospital admission, Bruising, Other complaints     Plan:   1. INR is SUPRAtherapeutic today at 3.35, higher than last week despite dose reduction. Patient denies any reasonable mechanism, she does state she needs to start adding more green vegetables to  her diet. Her goal is 1.5 - 2.5.  Instructed patient to eat a small serving of green veggies tonight or tomorrow and take warfarin 2.5 mg dose tonight and 5mg tomorrow (~6% dec from last week, 11% dec of maintenance dose over last 2 weeks), then continue maintenance dose of warfarin 7.5mg daily except 5mg MonWedFri with an INR check this Fri.  2. Repeat INR on Friday, 10/12.  3. Verbal information provided over the phone. Patient RBV dosing instructions, expresses understanding by teach back, and has no further questions at this time.    Hans Coon, PharmD Candidate 2019  10/8/2018  11:36 AM     I, Grecia Segovia, [facility], have reviewed the note in full and agree with the assessment and plan.  10/09/18  9:45 AM

## 2018-10-12 ENCOUNTER — ANTICOAGULATION VISIT (OUTPATIENT)
Dept: PHARMACY | Facility: HOSPITAL | Age: 33
End: 2018-10-12

## 2018-10-12 ENCOUNTER — LAB (OUTPATIENT)
Dept: LAB | Facility: HOSPITAL | Age: 33
End: 2018-10-12

## 2018-10-12 DIAGNOSIS — Z95.2 HX OF AORTIC VALVE REPLACEMENT, MECHANICAL: ICD-10-CM

## 2018-10-12 LAB
INR PPP: 1.96 (ref 0.9–1.1)
PROTHROMBIN TIME: 22.6 SECONDS (ref 11–15.4)

## 2018-10-12 PROCEDURE — 36415 COLL VENOUS BLD VENIPUNCTURE: CPT

## 2018-10-12 PROCEDURE — 85610 PROTHROMBIN TIME: CPT

## 2018-10-12 NOTE — PROGRESS NOTES
Anticoagulation Clinic - Remote Progress Note  REMOTE LAB    Indication: mechanical AVR (On-X)  Referring Provider: Latoya  Initial Warfarin Start Date:   Goal INR: 1.5-2.5  Current Drug Interactions: aspirin    Diet: one serving of something green once weekly (salads with raw spinach, asparagus -- avoids broccoli, etc)  Alcohol: none  Tobacco: none  OTC Pain Medication: APAP PRN    INR History:  Date 8/16 8/25 9/6 9/15 10/2 10/16 10/30 11/20 12/8   Total Weekly Dose 57.5 mg 57.5 mg 52.5 mg 37.5-45 mg 52.5 mg 52.5 mg 52.5 mg 45mg 55mg   INR 2.76 2.77 2.70 1.7 1.55 2.04 2.93 1.42 2.55   Notes   fewer GLV miss dose x 2?  Thrive Thrive miss dose x 1 ill; dex / Roceph IM inj.     Date 12/26 1/9/18 1/22 2/6 2/22 3/13 4/5 4/16 4/24   Total Weekly Dose 55mg 52.5 mg 52.5mg 52.5mg 52.5 mg 45mg 50mg 47.5 mg 45mg   INR 2.72 2.3 2.35 2.68 2.3 2.55 2.72 2.57 2.34   Notes flu; Tamiflu     1 miss  2 miss ill; dex / Roceph IM inj.     Date 4/27 5/2 5/18 6/8 7/9 8/2 8/16 8/30  10/1 10/8 10/12   Total Weekly Dose 45mg 45mg 45mg 45mg 45mg 45mg 45mg 45mg  45 mg 42.5mg 40mg   INR 2.37 2.03 2.05 2.16 1.83 2.84 2.26 2.38  3.03 3.35 1.96   Notes  Zpak        x1 reduced dose  inc GLV     Phone Interview:  Tablet Strength: 5mg tablets  Current Maintenance Dose: 7.5mg daily except 5mg MonWedFri  Patient Contact Info: [phone]   Lab Contact Info: [facility]    Patient Findings   Positives:   Change in diet/appetite   Negatives:   Signs/symptoms of thrombosis, Signs/symptoms of bleeding, Laboratory test error suspected, Change in health, Change in alcohol use, Change in activity, Upcoming invasive procedure, Emergency department visit, Upcoming dental procedure, Missed doses, Extra doses, Change in medications, Hospital admission, Bruising, Other complaints   Comments:   Increased GLV intake (broccoli and cheese, salad (lydia), veggies includes cauliflower, broccoli and carrots). She plans to continue to eat GLV- discussed importance  of consistency.      Plan:   1. INR is therapeutic today following dose adjustment for SUPRAtherapeutic INR. She has had an increase in GLV  2. Repeat INR on Friday, 10/19.  3. Discussed Vit K foods and emailed Vit K sheet to [email or web address]  3. Verbal information provided over the phone. Patient RBV dosing instructions, expresses understanding by teach back, and has no further questions at this time.      Carmelita Morales, PharmD  10/12/2018  12:39 PM

## 2018-10-17 RX ORDER — WARFARIN SODIUM 2.5 MG/1
TABLET ORAL
Qty: 60 TABLET | Refills: 1 | Status: SHIPPED | OUTPATIENT
Start: 2018-10-17 | End: 2018-10-24 | Stop reason: SDUPTHER

## 2018-10-19 ENCOUNTER — LAB (OUTPATIENT)
Dept: LAB | Facility: HOSPITAL | Age: 33
End: 2018-10-19

## 2018-10-19 ENCOUNTER — ANTICOAGULATION VISIT (OUTPATIENT)
Dept: PHARMACY | Facility: HOSPITAL | Age: 33
End: 2018-10-19

## 2018-10-19 DIAGNOSIS — Z95.2 HX OF AORTIC VALVE REPLACEMENT, MECHANICAL: ICD-10-CM

## 2018-10-19 LAB
INR PPP: 1.56 (ref 0.9–1.1)
PROTHROMBIN TIME: 18.9 SECONDS (ref 11–15.4)

## 2018-10-19 PROCEDURE — 36415 COLL VENOUS BLD VENIPUNCTURE: CPT

## 2018-10-19 PROCEDURE — 85610 PROTHROMBIN TIME: CPT

## 2018-10-19 NOTE — PROGRESS NOTES
Anticoagulation Clinic - Remote Progress Note  REMOTE LAB    Indication: mechanical AVR (On-X)  Referring Provider: Latoya  Initial Warfarin Start Date:   Goal INR: 1.5-2.5  Current Drug Interactions: aspirin    Diet: one serving of something green once weekly (salads with raw spinach, asparagus -- avoids broccoli, etc)  Alcohol: none  Tobacco: none  OTC Pain Medication: APAP PRN    INR History:  Date 8/16 8/25 9/6 9/15 10/2 10/16 10/30 11/20 12/8   Total Weekly Dose 57.5 mg 57.5 mg 52.5 mg 37.5-45 mg 52.5 mg 52.5 mg 52.5 mg 45mg 55mg   INR 2.76 2.77 2.70 1.7 1.55 2.04 2.93 1.42 2.55   Notes   fewer GLV miss dose x 2?  Thrive Thrive miss dose x 1 ill; dex / Roceph IM inj.     Date 12/26 1/9/18 1/22 2/6 2/22 3/13 4/5 4/16 4/24   Total Weekly Dose 55mg 52.5 mg 52.5mg 52.5mg 52.5 mg 45mg 50mg 47.5 mg 45mg   INR 2.72 2.3 2.35 2.68 2.3 2.55 2.72 2.57 2.34   Notes flu; Tamiflu     1 miss  2 miss ill; dex / Roceph IM inj.     Date 4/27 5/2 5/18 6/8 7/9 8/2 8/16 8/30  10/1 10/8 10/12   Total Weekly Dose 45mg 45mg 45mg 45mg 45mg 45mg 45mg 45mg  45 mg 42.5mg 40mg   INR 2.37 2.03 2.05 2.16 1.83 2.84 2.26 2.38  3.03 3.35 1.96   Notes  Zpak        x1 reduced dose  inc GLV     Date 10/19             Total Weekly Dose 45mg             INR 1.56             Notes                Phone Interview:  Tablet Strength: 5mg tablets  Current Maintenance Dose: 7.5mg daily except 5mg MonWedFri  Patient Contact Info: [phone]   Lab Contact Info: [facility]    Patient Findings   Negatives:   Signs/symptoms of thrombosis, Signs/symptoms of bleeding, Laboratory test error suspected, Change in health, Change in alcohol use, Change in activity, Upcoming invasive procedure, Emergency department visit, Upcoming dental procedure, Missed doses, Extra doses, Change in medications, Change in diet/appetite, Hospital admission, Bruising, Other complaints   Comments:   She has continued to eat high Vit K containing foods (this week she ate brussel  sprouts instead of broccoli and cheese). She plans to keep this consistent     Plan:   1. INR is therapeutic today following increase in GLV. Given continued to see a decrease in her INR with increase in high Vit K containing foods in her diet, will increase her dose to 7.5mg daily except 5mg MonFri.   2. Repeat INR on Friday, 10/26  3. Patient requests warfarin 2.5mg tablets called in. Sent in electronically.   4. Verbal information provided over the phone. Patient RBV dosing instructions, expresses understanding by teach back, and has no further questions at this time.    Carmelita Morales, PharmD  10/19/2018  12:19 PM

## 2018-10-25 RX ORDER — WARFARIN SODIUM 2.5 MG/1
TABLET ORAL
Qty: 30 TABLET | Refills: 3 | Status: SHIPPED | OUTPATIENT
Start: 2018-10-25 | End: 2019-04-02 | Stop reason: SDUPTHER

## 2018-10-26 ENCOUNTER — LAB (OUTPATIENT)
Dept: LAB | Facility: HOSPITAL | Age: 33
End: 2018-10-26

## 2018-10-26 ENCOUNTER — ANTICOAGULATION VISIT (OUTPATIENT)
Dept: PHARMACY | Facility: HOSPITAL | Age: 33
End: 2018-10-26

## 2018-10-26 DIAGNOSIS — Z95.2 HX OF AORTIC VALVE REPLACEMENT, MECHANICAL: ICD-10-CM

## 2018-10-26 LAB
INR PPP: 2.18 (ref 0.9–1.1)
PROTHROMBIN TIME: 24.5 SECONDS (ref 11–15.4)

## 2018-10-26 PROCEDURE — 36415 COLL VENOUS BLD VENIPUNCTURE: CPT

## 2018-10-26 PROCEDURE — 85610 PROTHROMBIN TIME: CPT

## 2018-10-26 NOTE — PROGRESS NOTES
Anticoagulation Clinic - Remote Progress Note  REMOTE LAB    Indication: mechanical AVR (On-X)  Referring Provider: Latoya  Initial Warfarin Start Date:   Goal INR: 1.5-2.5  Current Drug Interactions: aspirin    Diet: one serving of something green once weekly (salads with raw spinach, asparagus -- avoids broccoli, etc)  Alcohol: none  Tobacco: none  OTC Pain Medication: APAP PRN    INR History:  Date 8/16 8/25 9/6 9/15 10/2 10/16 10/30 11/20 12/8   Total Weekly Dose 57.5 mg 57.5 mg 52.5 mg 37.5-45 mg 52.5 mg 52.5 mg 52.5 mg 45mg 55mg   INR 2.76 2.77 2.70 1.7 1.55 2.04 2.93 1.42 2.55   Notes   fewer GLV miss dose x 2?  Thrive Thrive miss dose x 1 ill; dex / Roceph IM inj.     Date 12/26 1/9/18 1/22 2/6 2/22 3/13 4/5 4/16 4/24   Total Weekly Dose 55mg 52.5 mg 52.5mg 52.5mg 52.5 mg 45mg 50mg 47.5 mg 45mg   INR 2.72 2.3 2.35 2.68 2.3 2.55 2.72 2.57 2.34   Notes flu; Tamiflu     1 miss  2 miss ill; dex / Roceph IM inj.     Date 4/27 5/2 5/18 6/8 7/9 8/2 8/16 8/30  10/1 10/8 10/12   Total Weekly Dose 45mg 45mg 45mg 45mg 45mg 45mg 45mg 45mg  45 mg 42.5mg 40mg   INR 2.37 2.03 2.05 2.16 1.83 2.84 2.26 2.38  3.03 3.35 1.96   Notes  Zpak        x1 reduced dose  inc GLV     Date 10/19 10/26 11/9           Total Weekly Dose 45mg 50mg 47.5mg           INR 1.56 2.18            Notes                Phone Interview:  Tablet Strength: 5mg tablets  Current Maintenance Dose: 7.5mg daily except 5mg MonWedFri  Patient Contact Info: [phone]   Lab Contact Info: [facility]    Patient Findings   Negatives:   Signs/symptoms of thrombosis, Signs/symptoms of bleeding, Laboratory test error suspected, Change in health, Change in alcohol use, Change in activity, Upcoming invasive procedure, Emergency department visit, Upcoming dental procedure, Missed doses, Extra doses, Change in medications, Change in diet/appetite, Hospital admission, Bruising, Other complaints     Plan:   1. INR is therapeutic today at 2.18. Patient confirmed doses  and denies any changes.Instructed Mrs. Queen to resume warfarin 7.5mg daily except 5mg MonFri. Patient has been therapeutic last 2 checks, did require 1 small boost after an INR at bottom of range following increased GLV intake - as such pushed out next check to 2 weeks.   2. Repeat INR in 2 weeks, 11/9.  3. Patient requests warfarin 2.5mg tablets called in. States her pharmacy did not receive last week, please call in to [facility] in [facility] ([phone])  4. Verbal information provided over the phone. Patient RBV dosing instructions, expresses understanding by teach back, and has no further questions at this time.    Hans Coon, PharmD Candidate 2019  10/26/2018  9:49 AM     Refills were received by [facility] yesterday.   IGrecia, [facility], have reviewed the note in full and agree with the assessment and plan.  10/26/18  10:39 AM

## 2018-10-26 NOTE — TELEPHONE ENCOUNTER
2.5mg refill sent yesterday by Dr. Klein, #[phone] (Ms. Queen uses 5mg and 2.5mg tablets). Called and verified rx receipt by [facility].

## 2018-11-08 DIAGNOSIS — Q23.1 BICUSPID AORTIC VALVE: Primary | ICD-10-CM

## 2018-11-09 ENCOUNTER — ANTICOAGULATION VISIT (OUTPATIENT)
Dept: PHARMACY | Facility: HOSPITAL | Age: 33
End: 2018-11-09

## 2018-11-09 ENCOUNTER — LAB (OUTPATIENT)
Dept: LAB | Facility: HOSPITAL | Age: 33
End: 2018-11-09

## 2018-11-09 DIAGNOSIS — Z95.2 HX OF AORTIC VALVE REPLACEMENT, MECHANICAL: ICD-10-CM

## 2018-11-09 LAB
INR PPP: 1.8 (ref 0.9–1.1)
PROTHROMBIN TIME: 21.2 SECONDS (ref 11–15.4)

## 2018-11-09 PROCEDURE — 85610 PROTHROMBIN TIME: CPT

## 2018-11-09 PROCEDURE — 36415 COLL VENOUS BLD VENIPUNCTURE: CPT

## 2018-11-09 NOTE — PROGRESS NOTES
Anticoagulation Clinic - Remote Progress Note  REMOTE LAB    Indication: mechanical AVR (On-X)  Referring Provider: Latoya  Initial Warfarin Start Date:   Goal INR: 1.5-2.5  Current Drug Interactions: aspirin    Diet: one serving of something green once weekly (salads with raw spinach, asparagus -- avoids broccoli, etc)  Alcohol: none  Tobacco: none  OTC Pain Medication: APAP PRN    INR History:  Date 8/16 8/25 9/6 9/15 10/2 10/16 10/30 11/20 12/8   Total Weekly Dose 57.5 mg 57.5 mg 52.5 mg 37.5-45 mg 52.5 mg 52.5 mg 52.5 mg 45mg 55mg   INR 2.76 2.77 2.70 1.7 1.55 2.04 2.93 1.42 2.55   Notes   fewer GLV miss dose x 2?  Thrive Thrive miss dose x 1 ill; dex / Roceph IM inj.     Date 12/26 1/9/18 1/22 2/6 2/22 3/13 4/5 4/16 4/24   Total Weekly Dose 55mg 52.5 mg 52.5mg 52.5mg 52.5 mg 45mg 50mg 47.5 mg 45mg   INR 2.72 2.3 2.35 2.68 2.3 2.55 2.72 2.57 2.34   Notes flu; Tamiflu     1 miss  2 miss ill; dex / Roceph IM inj.     Date 4/27 5/2 5/18 6/8 7/9 8/2 8/16 8/30  10/1 10/8 10/12   Total Weekly Dose 45mg 45mg 45mg 45mg 45mg 45mg 45mg 45mg  45 mg 42.5mg 40mg   INR 2.37 2.03 2.05 2.16 1.83 2.84 2.26 2.38  3.03 3.35 1.96   Notes  Zpak        x1 reduced dose  inc GLV     Date 10/19 10/26 11/9           Total Weekly Dose 45mg 50mg 47.5mg           INR 1.56 2.18 1.8           Notes                Phone Interview:  Tablet Strength: 5mg tablets  Current Maintenance Dose: 7.5mg daily except 5mg MonWedFri  Patient Contact Info: [phone]   Lab Contact Info: [facility]    Patient Findings:  Positives:   Other complaints   Negatives:   Signs/symptoms of thrombosis, Signs/symptoms of bleeding, Laboratory test error suspected, Change in health, Change in alcohol use, Change in activity, Upcoming invasive procedure, Emergency department visit, Upcoming dental procedure, Missed doses, Extra doses, Change in medications, Change in diet/appetite, Hospital admission, Bruising   Comments:   Patient is interested in starting oral  contraceptives - her OB/GYN asked to consult us first since she is taking warfarin. Genaro Chu, PharmD, discussed this in detail with patient. Otherwise, no changes.     Plan:   1. INR is therapeutic today at 1.8. Instructed Mrs. Queen to resume warfarin 7.5mg daily except 5mg MonFri.  2. Repeat INR in three weeks, 11/30.  3. Verbal information provided over the phone. Patient RBV dosing instructions, expresses understanding by teach back, and has no further questions at this time.    Delphine You, [place]  11/9/2018  12:23 PM     We discussed the potential moderate DDI of bcp and warfarin and unknown result and increase of embolism that accompanies estrogen products. PMH includes tubal ligation and would be using for cycle regulation. We would need to monitor her INR closely with initiation and continuation of estrogen/progesterone products.  I, Genaro Chu, [facility], have reviewed the note in full and agree with the assessment and plan.  11/09/18  2:21 PM

## 2018-11-16 ENCOUNTER — HOSPITAL ENCOUNTER (OUTPATIENT)
Dept: CARDIOLOGY | Facility: HOSPITAL | Age: 33
Discharge: HOME | End: 2018-11-16
Attending: INTERNAL MEDICINE | Admitting: INTERNAL MEDICINE

## 2018-11-16 VITALS
DIASTOLIC BLOOD PRESSURE: 83 MMHG | BODY MASS INDEX: 32.05 KG/M2 | WEIGHT: 174.16 LBS | HEIGHT: 62 IN | SYSTOLIC BLOOD PRESSURE: 128 MMHG

## 2018-11-16 PROCEDURE — 93306 TTE W/DOPPLER COMPLETE: CPT | Performed by: INTERNAL MEDICINE

## 2018-11-16 PROCEDURE — 93306 TTE W/DOPPLER COMPLETE: CPT

## 2018-11-19 ENCOUNTER — OFFICE VISIT (OUTPATIENT)
Dept: CARDIOLOGY | Facility: CLINIC | Age: 33
End: 2018-11-19

## 2018-11-19 VITALS
HEIGHT: 62 IN | SYSTOLIC BLOOD PRESSURE: 126 MMHG | OXYGEN SATURATION: 97 % | BODY MASS INDEX: 32.68 KG/M2 | WEIGHT: 177.6 LBS | DIASTOLIC BLOOD PRESSURE: 70 MMHG | HEART RATE: 88 BPM

## 2018-11-19 DIAGNOSIS — Z95.2 HX OF AORTIC VALVE REPLACEMENT, MECHANICAL: Primary | ICD-10-CM

## 2018-11-19 LAB
BH CV ECHO MEAS - AO MAX PG (FULL): 11.5 MMHG
BH CV ECHO MEAS - AO MAX PG: 17 MMHG
BH CV ECHO MEAS - AO MEAN PG (FULL): 7 MMHG
BH CV ECHO MEAS - AO MEAN PG: 9 MMHG
BH CV ECHO MEAS - AO V2 MAX: 204 CM/SEC
BH CV ECHO MEAS - AO V2 MEAN: 141 CM/SEC
BH CV ECHO MEAS - AO V2 VTI: 44.7 CM
BH CV ECHO MEAS - BSA(HAYCOCK): 1.9 M^2
BH CV ECHO MEAS - BSA: 1.8 M^2
BH CV ECHO MEAS - BZI_BMI: 32 KILOGRAMS/M^2
BH CV ECHO MEAS - BZI_METRIC_HEIGHT: 157.5 CM
BH CV ECHO MEAS - BZI_METRIC_WEIGHT: 79.4 KG
BH CV ECHO MEAS - EDV(CUBED): 82.3 ML
BH CV ECHO MEAS - EDV(MOD-SP4): 97 ML
BH CV ECHO MEAS - EDV(TEICH): 85.4 ML
BH CV ECHO MEAS - EF(CUBED): 69.1 %
BH CV ECHO MEAS - EF(MOD-BP): 60 %
BH CV ECHO MEAS - EF(MOD-SP4): 67 %
BH CV ECHO MEAS - EF(TEICH): 61 %
BH CV ECHO MEAS - ESV(CUBED): 25.4 ML
BH CV ECHO MEAS - ESV(MOD-SP4): 32 ML
BH CV ECHO MEAS - ESV(TEICH): 33.3 ML
BH CV ECHO MEAS - FS: 32.4 %
BH CV ECHO MEAS - IVS/LVPW: 1.1
BH CV ECHO MEAS - IVSD: 1.2 CM
BH CV ECHO MEAS - LA DIMENSION: 3.7 CM
BH CV ECHO MEAS - LAT PEAK E' VEL: 10.1 CM/SEC
BH CV ECHO MEAS - LATERAL E/E' RATIO: 10.3
BH CV ECHO MEAS - LV DIASTOLIC VOL/BSA (35-75): 53.7 ML/M^2
BH CV ECHO MEAS - LV MASS(C)D: 168.1 GRAMS
BH CV ECHO MEAS - LV MASS(C)DI: 93.1 GRAMS/M^2
BH CV ECHO MEAS - LV MAX PG: 5.5 MMHG
BH CV ECHO MEAS - LV MEAN PG: 2 MMHG
BH CV ECHO MEAS - LV SYSTOLIC VOL/BSA (12-30): 17.7 ML/M^2
BH CV ECHO MEAS - LV V1 MAX: 117 CM/SEC
BH CV ECHO MEAS - LV V1 MEAN: 68.6 CM/SEC
BH CV ECHO MEAS - LV V1 VTI: 25.5 CM
BH CV ECHO MEAS - LVIDD: 4.4 CM
BH CV ECHO MEAS - LVIDS: 2.9 CM
BH CV ECHO MEAS - LVLD AP4: 8.3 CM
BH CV ECHO MEAS - LVLS AP4: 6.6 CM
BH CV ECHO MEAS - LVPWD: 1.1 CM
BH CV ECHO MEAS - MED PEAK E' VEL: 10.7 CM/SEC
BH CV ECHO MEAS - MEDIAL E/E' RATIO: 9.7
BH CV ECHO MEAS - MV A MAX VEL: 56.3 CM/SEC
BH CV ECHO MEAS - MV E MAX VEL: 104 CM/SEC
BH CV ECHO MEAS - MV E/A: 1.8
BH CV ECHO MEAS - PA ACC SLOPE: 602 CM/SEC^2
BH CV ECHO MEAS - PA ACC TIME: 0.17 SEC
BH CV ECHO MEAS - PA PR(ACCEL): 4.3 MMHG
BH CV ECHO MEAS - RVDD: 2.8 CM
BH CV ECHO MEAS - SI(CUBED): 31.5 ML/M^2
BH CV ECHO MEAS - SI(MOD-SP4): 36 ML/M^2
BH CV ECHO MEAS - SI(TEICH): 28.8 ML/M^2
BH CV ECHO MEAS - SV(CUBED): 56.9 ML
BH CV ECHO MEAS - SV(MOD-SP4): 65 ML
BH CV ECHO MEAS - SV(TEICH): 52 ML
BH CV ECHO MEAS - TR MAX PG: 33 MMHG
BH CV ECHO MEAS - TR MAX VEL: 286 CM/SEC
BH CV ECHO MEASUREMENTS AVERAGE E/E' RATIO: 10
BH CV XLRA - RV BASE: 3.2 CM
BH CV XLRA - RV LENGTH: 5.1 CM
BH CV XLRA - RV MID: 2.4 CM
LV EF 2D ECHO EST: 60 %

## 2018-11-19 PROCEDURE — 99213 OFFICE O/P EST LOW 20 MIN: CPT | Performed by: INTERNAL MEDICINE

## 2018-11-30 ENCOUNTER — APPOINTMENT (OUTPATIENT)
Dept: LAB | Facility: HOSPITAL | Age: 33
End: 2018-11-30

## 2018-11-30 ENCOUNTER — ANTICOAGULATION VISIT (OUTPATIENT)
Dept: PHARMACY | Facility: HOSPITAL | Age: 33
End: 2018-11-30

## 2018-11-30 ENCOUNTER — LAB (OUTPATIENT)
Dept: LAB | Facility: HOSPITAL | Age: 33
End: 2018-11-30

## 2018-11-30 DIAGNOSIS — Z95.2 HX OF AORTIC VALVE REPLACEMENT, MECHANICAL: ICD-10-CM

## 2018-11-30 LAB
INR PPP: 3.13 (ref 0.9–1.1)
PROTHROMBIN TIME: 32.5 SECONDS (ref 11–15.4)

## 2018-11-30 PROCEDURE — 85610 PROTHROMBIN TIME: CPT

## 2018-11-30 PROCEDURE — 36415 COLL VENOUS BLD VENIPUNCTURE: CPT

## 2018-11-30 NOTE — PROGRESS NOTES
Anticoagulation Clinic - Remote Progress Note  REMOTE LAB    Indication: mechanical AVR (On-X)  Referring Provider: Latoya  Initial Warfarin Start Date:   Goal INR: 1.5-2.5  Current Drug Interactions: aspirin    Diet: one serving of something green once weekly (salads with raw spinach, asparagus -- avoids broccoli, etc)  Alcohol: none  Tobacco: none  OTC Pain Medication: APAP PRN    INR History:  Date 8/16 8/25 9/6 9/15 10/2 10/16 10/30 11/20 12/8   Total Weekly Dose 57.5 mg 57.5 mg 52.5 mg 37.5-45 mg 52.5 mg 52.5 mg 52.5 mg 45mg 55mg   INR 2.76 2.77 2.70 1.7 1.55 2.04 2.93 1.42 2.55   Notes   fewer GLV miss dose x 2?  Thrive Thrive miss dose x 1 ill; dex / Roceph IM inj.     Date 12/26 1/9/18 1/22 2/6 2/22 3/13 4/5 4/16 4/24   Total Weekly Dose 55mg 52.5 mg 52.5mg 52.5mg 52.5 mg 45mg 50mg 47.5 mg 45mg   INR 2.72 2.3 2.35 2.68 2.3 2.55 2.72 2.57 2.34   Notes flu; Tamiflu     1 miss  2 miss ill; dex / Roceph IM inj.     Date 4/27 5/2 5/18 6/8 7/9 8/2 8/16 8/30  10/1 10/8 10/12   Total Weekly Dose 45mg 45mg 45mg 45mg 45mg 45mg 45mg 45mg  45 mg 42.5mg 40mg   INR 2.37 2.03 2.05 2.16 1.83 2.84 2.26 2.38  3.03 3.35 1.96   Notes  Zpak        x1 reduced dose  inc GLV     Date 10/19 10/26 11/9 11/30          Total Weekly Dose 45mg 50mg 47.5 mg 47.5 mg          INR 1.56 2.18 1.8 3.13          Notes    fewer GLV            Phone Interview:  Tablet Strength: 5mg tablets  Patient Contact Info: [phone]   Lab Contact Info: [facility]    Patient Findings:  Positives:  Change in diet/appetite   Negatives:  Signs/symptoms of thrombosis, Signs/symptoms of bleeding, Laboratory test error suspected, Change in health, Change in alcohol use, Change in activity, Upcoming invasive procedure, Emergency department visit, Upcoming dental procedure, Missed doses, Extra doses, Change in medications, Hospital admission, Bruising, Other complaints   Comments:  Mrs. Queen ate fewer GLV over the past two weeks with the Thanksgiving holiday.  She denies signs of bleeding or abnormal bruising at this time.      Plan:   1. INR is supratherapeutic today. This is likely due to altered diet over the holiday, but based on her recent dosing trend and level of elevation today (lower goal 1.5-2.5), instructed Mrs. Queen to resume her usual vitamin K intake (she plans to have [employer] tonight) and lower her dose back to warfarin 7.5mg daily except 5mg MonWedFri (5% decr.).  2. Repeat INR in one week to ensure back WNL.   3. Verbal information provided over the phone. Patient RBV dosing instructions, expresses understanding by teach back, and has no further questions at this time.    Ann Cowden Mayer, PharmD  11/30/2018  2:18 PM

## 2018-12-14 ENCOUNTER — LAB (OUTPATIENT)
Dept: LAB | Facility: HOSPITAL | Age: 33
End: 2018-12-14

## 2018-12-14 ENCOUNTER — ANTICOAGULATION VISIT (OUTPATIENT)
Dept: PHARMACY | Facility: HOSPITAL | Age: 33
End: 2018-12-14

## 2018-12-14 DIAGNOSIS — Z95.2 HX OF AORTIC VALVE REPLACEMENT, MECHANICAL: ICD-10-CM

## 2018-12-14 LAB
INR PPP: 3 (ref 0.9–1.1)
PROTHROMBIN TIME: 31.4 SECONDS (ref 11–15.4)

## 2018-12-14 PROCEDURE — 36415 COLL VENOUS BLD VENIPUNCTURE: CPT

## 2018-12-14 PROCEDURE — 85610 PROTHROMBIN TIME: CPT

## 2018-12-14 RX ORDER — WARFARIN SODIUM 5 MG/1
TABLET ORAL
Qty: 135 TABLET | Refills: 0 | Status: SHIPPED | OUTPATIENT
Start: 2018-12-14 | End: 2019-03-18 | Stop reason: SDUPTHER

## 2018-12-14 NOTE — PROGRESS NOTES
Anticoagulation Clinic - Remote Progress Note  REMOTE LAB    Indication: mechanical AVR (On-X)  Referring Provider: Latoya  Initial Warfarin Start Date:   Goal INR: 1.5-2.5  Current Drug Interactions: aspirin    Diet: one serving of something green twice weekly (salads with raw spinach, asparagus -- avoids broccoli, etc)  Alcohol: none  Tobacco: none  OTC Pain Medication: APAP PRN    INR History:  Date 8/16 8/25 9/6 9/15 10/2 10/16 10/30 11/20 12/8   Total Weekly Dose 57.5 mg 57.5 mg 52.5 mg 37.5-45 mg 52.5 mg 52.5 mg 52.5 mg 45mg 55mg   INR 2.76 2.77 2.70 1.7 1.55 2.04 2.93 1.42 2.55   Notes   fewer GLV miss dose x 2?  Thrive Thrive miss dose x 1 ill; dex / Roceph IM inj.     Date 12/26 1/9/18 1/22 2/6 2/22 3/13 4/5 4/16 4/24   Total Weekly Dose 55mg 52.5 mg 52.5mg 52.5mg 52.5 mg 45mg 50mg 47.5 mg 45mg   INR 2.72 2.3 2.35 2.68 2.3 2.55 2.72 2.57 2.34   Notes flu; Tamiflu     1 miss  2 miss ill; dex / Roceph IM inj.     Date 4/27 5/2 5/18 6/8 7/9 8/2 8/16 8/30  10/1 10/8 10/12   Total Weekly Dose 45mg 45mg 45mg 45mg 45mg 45mg 45mg 45mg  45 mg 42.5mg 40mg   INR 2.37 2.03 2.05 2.16 1.83 2.84 2.26 2.38  3.03 3.35 1.96   Notes  Zpak        x1 reduced dose  inc GLV     Date 10/19 10/26 11/9 11/30 12/14         Total Weekly Dose 45mg 50mg 47.5 mg 47.5 mg          INR 1.56 2.18 1.8 3.13 3.00         Notes    fewer GLV            Phone Interview:  Tablet Strength: 5mg tablets  Patient Contact Info: [phone]   Lab Contact Info: [facility]    Patient Findings     Negatives:  Signs/symptoms of thrombosis, Signs/symptoms of bleeding, Laboratory test error suspected, Change in health, Change in alcohol use, Change in activity, Upcoming invasive procedure, Emergency department visit, Upcoming dental procedure, Missed doses, Extra doses, Change in medications, Change in diet/appetite, Hospital admission, Bruising, Other complaints     Plan:   1. INR is supratherapeutic today at 3.00 based on noted goal above. Given her  supratherapeutic INR the past couple of INR checks instructed the patient to have a one-time decrease of 2.5mg on 12/14 (tonight) and to decrease her regimen to 7.5 mg Tues/Thurs/Sun with 5 mg M/W/F/Sa (10% decrease).  2. Repeat INR in one week at [facility] (12/21) to ensure back WNL.   3. Verbal information provided over the phone. Patient RBV dosing instructions, expresses understanding by teach back, and has no further questions at this time.  4. Patient expresses a need for 5mg warfarin refill which she would like sent to [facility] in [place].    Thanks,  Andrew Charlton, PharmD  Pharmacy Resident  12/14/2018  12:40 PM

## 2018-12-21 ENCOUNTER — ANTICOAGULATION VISIT (OUTPATIENT)
Dept: PHARMACY | Facility: HOSPITAL | Age: 33
End: 2018-12-21

## 2018-12-21 ENCOUNTER — LAB (OUTPATIENT)
Dept: LAB | Facility: HOSPITAL | Age: 33
End: 2018-12-21

## 2018-12-21 DIAGNOSIS — Z95.2 HX OF AORTIC VALVE REPLACEMENT, MECHANICAL: ICD-10-CM

## 2018-12-21 LAB
INR PPP: 2.13 (ref 0.9–1.1)
PROTHROMBIN TIME: 24.1 SECONDS (ref 11–15.4)

## 2018-12-21 PROCEDURE — 36415 COLL VENOUS BLD VENIPUNCTURE: CPT

## 2018-12-21 PROCEDURE — 85610 PROTHROMBIN TIME: CPT

## 2018-12-21 NOTE — PROGRESS NOTES
Anticoagulation Clinic - Remote Progress Note  REMOTE LAB    Indication: mechanical AVR (On-X)  Referring Provider: Latoya  Initial Warfarin Start Date:   Goal INR: 1.5-2.5  Current Drug Interactions: aspirin    Diet: one serving of something green twice weekly (salads with raw spinach, asparagus -- avoids broccoli, etc)  Alcohol: none  Tobacco: none  OTC Pain Medication: APAP PRN    INR History:  Date 8/16 8/25 9/6 9/15 10/2 10/16 10/30 11/20 12/8   Total Weekly Dose 57.5 mg 57.5 mg 52.5 mg 37.5-45 mg 52.5 mg 52.5 mg 52.5 mg 45mg 55mg   INR 2.76 2.77 2.70 1.7 1.55 2.04 2.93 1.42 2.55   Notes   fewer GLV miss dose x 2?  Thrive Thrive miss dose x 1 ill; dex / Roceph IM inj.     Date 12/26 1/9/18 1/22 2/6 2/22 3/13 4/5 4/16 4/24   Total Weekly Dose 55mg 52.5 mg 52.5mg 52.5mg 52.5 mg 45mg 50mg 47.5 mg 45mg   INR 2.72 2.3 2.35 2.68 2.3 2.55 2.72 2.57 2.34   Notes flu; Tamiflu     1 miss  2 miss ill; dex / Roceph IM inj.     Date 4/27 5/2 5/18 6/8 7/9 8/2 8/16 8/30  10/1 10/8 10/12   Total Weekly Dose 45mg 45mg 45mg 45mg 45mg 45mg 45mg 45mg  45mg 42.5mg 40mg   INR 2.37 2.03 2.05 2.16 1.83 2.84 2.26 2.38  3.03 3.35 1.96   Notes  Zpak        x1 reduced dose  inc GLV     Date 10/19 10/26 11/9 11/30 12/14 12/21        Total Weekly Dose 45mg 50mg 47.5  mg 47.5  mg 47.5  mg 40mg 42.5mg       INR 1.56 2.18 1.8 3.13 3.00 2.13        Notes    fewer GLV 1x decr dose 2x decr dose          Phone Interview:  Tablet Strength: 5mg tablets  Patient Contact Info: [phone]   Lab Contact Info: [facility]    Patient Findings   Negatives:  Signs/symptoms of thrombosis, Signs/symptoms of bleeding, Laboratory test error suspected, Change in health, Change in alcohol use, Change in activity, Upcoming invasive procedure, Emergency department visit, Upcoming dental procedure, Missed doses, Extra doses, Change in medications, Change in diet/appetite, Hospital admission, Bruising, Other complaints     Plan:   1. INR is therapeutic and back  WNL today at 2.13. Instructed patient to continue recently decreased dose of warfarin 5mg every day except for 7.5mg on SunTuesThurs (42.5mg/week)  2. Repeat INR in 1 week, 12/28, to ensure patient stays WNL  3. Verbal information provided over the phone. Deisi MIN Alcalay RBV dosing instructions, expresses understanding by teach back, and has no further questions at this time.  4. Patient declines the need for warfarin refills at this time    Genaro Rodriguez CPhT  12/21/2018  2:09 PM    IPaulette [facility], have reviewed the note in full and agree with the assessment and plan.  12/21/18  3:43 PM

## 2019-01-04 ENCOUNTER — ANTICOAGULATION VISIT (OUTPATIENT)
Dept: PHARMACY | Facility: HOSPITAL | Age: 34
End: 2019-01-04

## 2019-01-04 ENCOUNTER — LAB (OUTPATIENT)
Dept: LAB | Facility: HOSPITAL | Age: 34
End: 2019-01-04

## 2019-01-04 DIAGNOSIS — Z95.2 HX OF AORTIC VALVE REPLACEMENT, MECHANICAL: ICD-10-CM

## 2019-01-04 LAB
INR PPP: 1.92 (ref 0.9–1.1)
PROTHROMBIN TIME: 22.3 SECONDS (ref 11–15.4)

## 2019-01-04 PROCEDURE — 85610 PROTHROMBIN TIME: CPT

## 2019-01-04 PROCEDURE — 36415 COLL VENOUS BLD VENIPUNCTURE: CPT

## 2019-01-04 NOTE — PROGRESS NOTES
Anticoagulation Clinic - Remote Progress Note  REMOTE LAB    Indication: mechanical AVR (On-X)  Referring Provider: Latoya  Initial Warfarin Start Date:   Goal INR: 1.5-2.5  Current Drug Interactions: aspirin    Diet: one serving of something green twice weekly (salads with raw spinach, asparagus -- avoids broccoli, etc)  Alcohol: none  Tobacco: none  OTC Pain Medication: APAP PRN    INR History:  Date 8/16 8/25 9/6 9/15 10/2 10/16 10/30 11/20 12/8   Total Weekly Dose 57.5 mg 57.5 mg 52.5 mg 37.5-45 mg 52.5 mg 52.5 mg 52.5 mg 45mg 55mg   INR 2.76 2.77 2.70 1.7 1.55 2.04 2.93 1.42 2.55   Notes   fewer GLV miss dose x 2?  Thrive Thrive miss dose x 1 ill; dex / Roceph IM inj.     Date 12/26 1/9/18 1/22 2/6 2/22 3/13 4/5 4/16 4/24   Total Weekly Dose 55mg 52.5 mg 52.5mg 52.5mg 52.5 mg 45mg 50mg 47.5 mg 45mg   INR 2.72 2.3 2.35 2.68 2.3 2.55 2.72 2.57 2.34   Notes flu; Tamiflu     1 miss  2 miss ill; dex / Roceph IM inj.     Date 4/27 5/2 5/18 6/8 7/9 8/2 8/16 8/30  10/1 10/8 10/12   Total Weekly Dose 45mg 45mg 45mg 45mg 45mg 45mg 45mg 45mg  45mg 42.5mg 40mg   INR 2.37 2.03 2.05 2.16 1.83 2.84 2.26 2.38  3.03 3.35 1.96   Notes  Zpak        x1 reduced dose  inc GLV     Date 10/19 10/26 11/9 11/30 12/14 12/21 1/4       Total Weekly Dose 45mg 50mg 47.5  mg 47.5  mg 47.5  mg 40mg 42.5mg       INR 1.56 2.18 1.8 3.13 3.00 2.13 1.92       Notes    fewer GLV 1x decr dose 2x decr dose          Phone Interview:  Tablet Strength: 5mg tablets  Patient Contact Info: [phone]   Lab Contact Info: [facility]    Patient Findings:  Negatives:  Signs/symptoms of thrombosis, Signs/symptoms of bleeding, Laboratory test error suspected, Change in health, Change in alcohol use, Change in activity, Upcoming invasive procedure, Emergency department visit, Upcoming dental procedure, Missed doses, Extra doses, Change in medications, Change in diet/appetite, Hospital admission, Bruising, Other complaints   Comments:  Patient was not at home  and unable to confirm weekly dose - asked patient to call us later to confirm dose but she refused.     Plan:   1. INR is therapeutic today at 1.92. Instructed patient to continue recently decreased dose of warfarin 5mg every day except for 7.5mg on SunTuesThurs (42.5mg/week).  2. Repeat INR in three weeks, 1/25.  3. Verbal information provided over the phone. Deisi COPELAND Queen RBV dosing instructions, expresses understanding by teach back, and has no further questions at this time.  4. Patient declines the need for warfarin refills at this time.    Delphine You CP, CP  1/4/2019  8:20 AM    I, Grecia Segovia, [facility], have reviewed the note in full and agree with the assessment and plan.  01/04/19  8:46 AM

## 2019-01-25 ENCOUNTER — LAB (OUTPATIENT)
Dept: LAB | Facility: HOSPITAL | Age: 34
End: 2019-01-25

## 2019-01-25 ENCOUNTER — ANTICOAGULATION VISIT (OUTPATIENT)
Dept: PHARMACY | Facility: HOSPITAL | Age: 34
End: 2019-01-25

## 2019-01-25 DIAGNOSIS — Z95.2 HX OF AORTIC VALVE REPLACEMENT, MECHANICAL: ICD-10-CM

## 2019-01-25 LAB
INR PPP: 1.36 (ref 0.9–1.1)
PROTHROMBIN TIME: 17 SECONDS (ref 11–15.4)

## 2019-01-25 PROCEDURE — 36415 COLL VENOUS BLD VENIPUNCTURE: CPT

## 2019-01-25 PROCEDURE — 85610 PROTHROMBIN TIME: CPT

## 2019-01-25 NOTE — PROGRESS NOTES
Anticoagulation Clinic - Remote Progress Note  REMOTE LAB    Indication: mechanical AVR (On-X)  Referring Provider: Latoya  Initial Warfarin Start Date:   Goal INR: 1.5-2.5  Current Drug Interactions: aspirin    Diet: one serving of something green twice weekly (salads with raw spinach, asparagus -- avoids broccoli, etc)  Alcohol: none  Tobacco: none  OTC Pain Medication: APAP PRN    INR History:  Date 8/16 8/25 9/6 9/15 10/2 10/16 10/30 11/20 12/8   Total Weekly Dose 57.5 mg 57.5 mg 52.5 mg 37.5-45 mg 52.5 mg 52.5 mg 52.5 mg 45mg 55mg   INR 2.76 2.77 2.70 1.7 1.55 2.04 2.93 1.42 2.55   Notes   fewer GLV miss dose x 2?  Thrive Thrive miss dose x 1 ill; dex / Roceph IM inj.     Date 12/26 1/9/18 1/22 2/6 2/22 3/13 4/5 4/16 4/24   Total Weekly Dose 55mg 52.5 mg 52.5mg 52.5mg 52.5 mg 45mg 50mg 47.5 mg 45mg   INR 2.72 2.3 2.35 2.68 2.3 2.55 2.72 2.57 2.34   Notes flu; Tamiflu     1 miss  2 miss ill; dex / Roceph IM inj.     Date 4/27 5/2 5/18 6/8 7/9 8/2 8/16 8/30  10/1 10/8 10/12   Total Weekly Dose 45mg 45mg 45mg 45mg 45mg 45mg 45mg 45mg  45mg 42.5mg 40mg   INR 2.37 2.03 2.05 2.16 1.83 2.84 2.26 2.38  3.03 3.35 1.96   Notes  Zpak        x1 reduced dose  inc GLV     Date 10/19 10/26 11/9 11/30 12/14 12/21 1/4 1/25      Total Weekly Dose 45mg 50mg 47.5  mg 47.5  mg 47.5  mg 40mg 42.5mg 42.5mg      INR 1.56 2.18 1.8 3.13 3.00 2.13 1.92mg 1.36      Notes    fewer GLV 1x decr dose 2x decr dose          Phone Interview:  Tablet Strength: 5mg tablets  Patient Contact Info: [phone]   Lab Contact Info: [facility]    Patient Findings   Positives:  Change in diet/appetite   Negatives:  Signs/symptoms of thrombosis, Signs/symptoms of bleeding, Laboratory test error suspected, Change in health, Change in alcohol use, Change in activity, Upcoming invasive procedure, Emergency department visit, Upcoming dental procedure, Missed doses, Extra doses, Change in medications, Hospital admission, Bruising, Other complaints    Comments:  Mrs. Queen reports that she has been eating brussel sprouts/ broccoli or asparagus ~2x/week. Discussed Vit K content and she reports she tries to interchange the broccoli and brussel sprouts given higher vit K content than asparagus. She also has lost weight.      Plan:   1. INR is SUBtherapeutic today. Instructed patient to increase her dose to warfarin 7.5mg every day except for 5mg on SatMonWed(45mg/week).  2. Repeat INR in 2 weeks. She lost cellphone service and LVM to retest in 2 weeks. Also asked her to call back to relay email so I can send dosing to her. She started giving her email address and we were disconnected.  3. Verbal information provided over the phone. Deisi Queen RBV dosing instructions, expresses understanding by teach back, and has no further questions at this time.  4. Patient declines the need for warfarin refills at this time.      Carmelita Morales, PharmD  1/25/2019  8:25 AM

## 2019-02-18 ENCOUNTER — LAB (OUTPATIENT)
Dept: LAB | Facility: HOSPITAL | Age: 34
End: 2019-02-18

## 2019-02-18 ENCOUNTER — ANTICOAGULATION VISIT (OUTPATIENT)
Dept: PHARMACY | Facility: HOSPITAL | Age: 34
End: 2019-02-18

## 2019-02-18 DIAGNOSIS — Z95.2 HX OF AORTIC VALVE REPLACEMENT, MECHANICAL: ICD-10-CM

## 2019-02-18 LAB
INR PPP: 2.52 (ref 0.9–1.1)
PROTHROMBIN TIME: 27.5 SECONDS (ref 11–15.4)

## 2019-02-18 PROCEDURE — 36415 COLL VENOUS BLD VENIPUNCTURE: CPT

## 2019-02-18 PROCEDURE — 85610 PROTHROMBIN TIME: CPT

## 2019-02-18 NOTE — PROGRESS NOTES
Anticoagulation Clinic - Remote Progress Note  REMOTE LAB    Indication: mechanical AVR (On-X)  Referring Provider: Latoya  Initial Warfarin Start Date:   Goal INR: 1.5-2.5  Current Drug Interactions: aspirin    Diet: one serving of something green twice weekly (salads with raw spinach, asparagus -- avoids broccoli, etc)  Alcohol: none  Tobacco: none  OTC Pain Medication: APAP PRN    INR History:  Date 8/16 8/25 9/6 9/15 10/2 10/16 10/30 11/20 12/8   Total Weekly Dose 57.5 mg 57.5 mg 52.5 mg 37.5-45 mg 52.5 mg 52.5 mg 52.5 mg 45mg 55mg   INR 2.76 2.77 2.70 1.7 1.55 2.04 2.93 1.42 2.55   Notes   fewer GLV miss dose x 2?  Thrive Thrive miss dose x 1 ill; dex / Roceph IM inj.     Date 12/26 1/9/18 1/22 2/6 2/22 3/13 4/5 4/16 4/24   Total Weekly Dose 55mg 52.5 mg 52.5mg 52.5mg 52.5 mg 45mg 50mg 47.5 mg 45mg   INR 2.72 2.3 2.35 2.68 2.3 2.55 2.72 2.57 2.34   Notes flu; Tamiflu     1 miss  2 miss ill; dex / Roceph IM inj.     Date 4/27 5/2 5/18 6/8 7/9 8/2 8/16 8/30  10/1 10/8 10/12   Total Weekly Dose 45mg 45mg 45mg 45mg 45mg 45mg 45mg 45mg  45mg 42.5mg 40mg   INR 2.37 2.03 2.05 2.16 1.83 2.84 2.26 2.38  3.03 3.35 1.96   Notes  Zpak        x1 reduced dose  inc GLV     Date 10/19 10/26 11/9 11/30 12/14 12/21 1/4 1/25 2/18     Total Weekly Dose 45mg 50mg 47.5  mg 47.5  mg 47.5  mg 40mg 42.5mg 42.5mg 42.5mg     INR 1.56 2.18 1.8 3.13 3.00 2.13 1.92mg 1.36 2.52     Notes    fewer GLV 1x decr dose 2x decr dose          Phone Interview:  Tablet Strength: 5mg tablets  Patient Contact Info: [phone]   Lab Contact Info: [facility]    Patient Findings   Negatives:  Signs/symptoms of thrombosis, Signs/symptoms of bleeding, Laboratory test error suspected, Change in health, Change in alcohol use, Change in activity, Upcoming invasive procedure, Emergency department visit, Upcoming dental procedure, Missed doses, Extra doses, Change in medications, Change in diet/appetite, Hospital admission, Bruising, Other complaints    Comments:  Mrs. Queen was unable to confirm dosing but stated it is written by pillbox at home. Please confirm at next encounter.     Plan:   1. INR is SUBtherapeutic today. Instructed patient to continue warfarin 7.5mg every day except for 5mg on SatMonWed (45mg/week).  2. Repeat INR in 3 weeks.   3. Verbal information provided over the phone. Deisi Queen RBV dosing instructions, expresses understanding by teach back, and has no further questions at this time.  4. Patient declines the need for warfarin refills at this time.    Carmelita Morales, PharmD  2/18/2019  3:23 PM

## 2019-03-18 ENCOUNTER — LAB (OUTPATIENT)
Dept: LAB | Facility: HOSPITAL | Age: 34
End: 2019-03-18

## 2019-03-18 ENCOUNTER — ANTICOAGULATION VISIT (OUTPATIENT)
Dept: PHARMACY | Facility: HOSPITAL | Age: 34
End: 2019-03-18

## 2019-03-18 DIAGNOSIS — Z95.2 HX OF AORTIC VALVE REPLACEMENT, MECHANICAL: ICD-10-CM

## 2019-03-18 LAB
INR PPP: 2.74 (ref 0.9–1.1)
PROTHROMBIN TIME: 29.3 SECONDS (ref 11–15.4)

## 2019-03-18 PROCEDURE — 85610 PROTHROMBIN TIME: CPT

## 2019-03-18 PROCEDURE — 36415 COLL VENOUS BLD VENIPUNCTURE: CPT

## 2019-03-18 RX ORDER — WARFARIN SODIUM 2.5 MG/1
TABLET ORAL
Qty: 30 TABLET | Refills: 3 | Status: CANCELLED | OUTPATIENT
Start: 2019-03-18

## 2019-03-18 RX ORDER — CHOLECALCIFEROL (VITAMIN D3) 125 MCG
1 CAPSULE ORAL DAILY
COMMUNITY
End: 2019-07-10

## 2019-03-18 RX ORDER — CHOLECALCIFEROL (VITAMIN D3) 125 MCG
5 CAPSULE ORAL DAILY
COMMUNITY

## 2019-03-18 NOTE — PROGRESS NOTES
Anticoagulation Clinic - Remote Progress Note  REMOTE LAB    Indication: mechanical AVR (On-X)  Referring Provider: Latoya  Initial Warfarin Start Date:   Goal INR: 1.5-2.5  Current Drug Interactions: aspirin    Diet: one serving of something green twice weekly (salads with raw spinach, asparagus -- avoids broccoli, etc)  Alcohol: none  Tobacco: none  OTC Pain Medication: APAP PRN    INR History:  Date 8/16 8/25 9/6 9/15 10/2 10/16 10/30 11/20 12/8   Total Weekly Dose 57.5 mg 57.5 mg 52.5 mg 37.5-45 mg 52.5 mg 52.5 mg 52.5 mg 45mg 55mg   INR 2.76 2.77 2.70 1.7 1.55 2.04 2.93 1.42 2.55   Notes   fewer GLV miss dose x 2?  Thrive Thrive miss dose x 1 ill; dex / Roceph IM inj.     Date 12/26 1/9/18 1/22 2/6 2/22 3/13 4/5 4/16 4/24   Total Weekly Dose 55mg 52.5 mg 52.5mg 52.5mg 52.5 mg 45mg 50mg 47.5 mg 45mg   INR 2.72 2.3 2.35 2.68 2.3 2.55 2.72 2.57 2.34   Notes flu; Tamiflu     1 miss  2 miss ill; dex / Roceph IM inj.     Date 4/27 5/2 5/18 6/8 7/9 8/2 8/16 8/30  10/1 10/8 10/12   Total Weekly Dose 45mg 45mg 45mg 45mg 45mg 45mg 45mg 45mg  45mg 42.5mg 40mg   INR 2.37 2.03 2.05 2.16 1.83 2.84 2.26 2.38  3.03 3.35 1.96   Notes  Zpak        x1 reduced dose  inc GLV     Date 10/19 10/26 11/9 11/30 12/14 12/21 1/4/19 1/25 2/18 3/18    Total Weekly Dose 45mg 50mg 47.5  mg 47.5  mg 47.5  mg 40mg 42.5mg 42.5mg 45mg 45mg    INR 1.56 2.18 1.8 3.13 3.00 2.13 1.92 1.36 2.52 2.74    Notes    fewer GLV 1x decr dose 2x decr dose    melatonin      Phone Interview:  Tablet Strength: 5mg tablets  Patient Contact Info: [phone]   Lab Contact Info: [facility]    Patient Findings   Positives:  Change in medications   Negatives:  Signs/symptoms of thrombosis, Signs/symptoms of bleeding, Laboratory test error suspected, Change in health, Change in alcohol use, Change in activity, Upcoming invasive procedure, Emergency department visit, Upcoming dental procedure, Missed doses, Extra doses, Change in diet/appetite, Hospital admission,  Bruising, Other complaints   Comments:  Patient reports taking melatonin 5mg QPM for sleep the past week. Per [employer]: Warning: Concurrent use of MELATONIN and WARFARIN may result in an increased risk of bleeding.     Clinical Management: Avoid concomitant use of melatonin and warfarin. If both agents are taken together, monitor prothrombin time, INR, and signs and symptoms of excessive bleeding frequently. Only adjust the warfarin dose if the patient takes a consistent dosage of melatonin with a consistent and standardized brand.   Patient has been counseled on possible interaction, will keep an eye out for s/sx of bleeding. Requested patient call us if she has any s/sx, she voiced understanding and reports she will likely stop taking melatonin going forward.     Plan:   1. INR is SUPRAtherapeutic today. After consulting with Sarah Zavala, NeshaD, instructed patient to decrease warfarin 5mg daily except for 7.5mg on SunThursFri (42.5mg/week, 5.6% decrease).  2. Repeat INR in 2 weeks  3. Verbal information provided over the phone. Deisi Alcalay RBV dosing instructions, expresses understanding by teach back, and has no further questions at this time.  4. Patient requests warfarin refills on her 5mg and 2.5mg tablets be called into [facility] in [place], [place]. Separate encounter created.  5. Med list reviewed and updated -- Request London Rodriguez, [place]  3/18/2019  1:54 PM     London holden I, Carmelita Morales, PharmD, have reviewed the note in full and agree with the assessment and plan. May need to consider change in strength tablets to target a dose between 42.5mg and 45mg if patient becomes SUBtherapeutic.  03/18/19  2:41 PM

## 2019-04-01 ENCOUNTER — LAB (OUTPATIENT)
Dept: LAB | Facility: HOSPITAL | Age: 34
End: 2019-04-01

## 2019-04-01 ENCOUNTER — ANTICOAGULATION VISIT (OUTPATIENT)
Dept: PHARMACY | Facility: HOSPITAL | Age: 34
End: 2019-04-01

## 2019-04-01 ENCOUNTER — TRANSCRIBE ORDERS (OUTPATIENT)
Dept: ADMINISTRATIVE | Facility: HOSPITAL | Age: 34
End: 2019-04-01

## 2019-04-01 DIAGNOSIS — Z95.2 HX OF AORTIC VALVE REPLACEMENT, MECHANICAL: ICD-10-CM

## 2019-04-01 DIAGNOSIS — E78.2 MIXED HYPERLIPIDEMIA: ICD-10-CM

## 2019-04-01 DIAGNOSIS — E78.2 MIXED HYPERLIPIDEMIA: Primary | ICD-10-CM

## 2019-04-01 LAB
INR PPP: 3.01 (ref 0.9–1.1)
PROTHROMBIN TIME: 31.5 SECONDS (ref 11–15.4)

## 2019-04-01 PROCEDURE — 80061 LIPID PANEL: CPT

## 2019-04-01 PROCEDURE — 80053 COMPREHEN METABOLIC PANEL: CPT

## 2019-04-01 PROCEDURE — 36415 COLL VENOUS BLD VENIPUNCTURE: CPT

## 2019-04-01 PROCEDURE — 85610 PROTHROMBIN TIME: CPT

## 2019-04-01 NOTE — PROGRESS NOTES
Anticoagulation Clinic - Remote Progress Note  REMOTE LAB    Indication: mechanical AVR (On-X)  Referring Provider: Latoya  Goal INR: 1.5-2.5  Current Drug Interactions: aspirin    Diet: one serving of something green twice weekly (salads with raw spinach, asparagus -- avoids broccoli, etc)  Alcohol: none  Tobacco: none  OTC Pain Medication: APAP PRN    INR History:  Date 8/16 8/25 9/6 9/15 10/2 10/16 10/30 11/20 12/8   Total Weekly Dose 57.5 mg 57.5 mg 52.5 mg 37.5-45 mg 52.5 mg 52.5 mg 52.5 mg 45mg 55mg   INR 2.76 2.77 2.70 1.7 1.55 2.04 2.93 1.42 2.55   Notes   fewer GLV miss dose x 2?  Thrive Thrive miss dose x 1 ill; dex / Roceph IM inj.     Date 12/26 1/9/18 1/22 2/6 2/22 3/13 4/5 4/16 4/24   Total Weekly Dose 55mg 52.5 mg 52.5mg 52.5mg 52.5 mg 45mg 50mg 47.5 mg 45mg   INR 2.72 2.3 2.35 2.68 2.3 2.55 2.72 2.57 2.34   Notes flu; Tamiflu     1 miss  2 miss ill; dex / Roceph IM inj.     Date 4/27 5/2 5/18 6/8 7/9 8/2 8/16 8/30  10/1 10/8 10/12   Total Weekly Dose 45mg 45mg 45mg 45mg 45mg 45mg 45mg 45mg  45mg 42.5mg 40mg   INR 2.37 2.03 2.05 2.16 1.83 2.84 2.26 2.38  3.03 3.35 1.96   Notes  Zpak        x1 reduced dose  inc GLV     Date 10/19 10/26 11/9 11/30 12/14 12/21 1/4/19 1/25 2/18 3/18 4/1   Total Weekly Dose 45mg 50mg 47.5  mg 47.5  mg 47.5  mg 40mg 42.5mg 42.5mg 45mg 45mg 42.5mg   INR 1.56 2.18 1.8 3.13 3.00 2.13 1.92 1.36 2.52 2.74 3.01   Notes    fewer GLV 1x decr dose 2x decr dose    melatonin      Phone Interview:  Tablet Strength: 5mg tablets  Patient Contact Info: [phone]   Lab Contact Info: [facility]    Patient Findings:  Negatives:  Signs/symptoms of thrombosis, Signs/symptoms of bleeding, Laboratory test error suspected, Change in health, Change in alcohol use, Change in activity, Upcoming invasive procedure, Emergency department visit, Upcoming dental procedure, Missed doses, Extra doses, Change in medications, Change in diet/appetite, Hospital admission, Bruising, Other complaints    Comments:  Patient reports no changes since last encounter.     Plan:   1. INR was SUPRAtherapeutic at 3.01. Instructed patient to decrease tonight's dose (4/1) to 2.5mg (LVM with instructions, patient recieved it and did decrease the dose to 2.5mg) and decrease her maintenance dose to warfarin 5mg daily except for 7.5mg on SunThurs   2. Repeat INR Thursday 4/11.  3. Verbal information provided over the phone. Deisi Queen RBV dosing instructions, expresses understanding by teach back, and has no further questions at this time.    Saul Knight, PharmD Candidate  4/1/2019  4:34 PM    12% decrease this week, 6% decrease in maintenance regimen. Note lower goal INR of 1.5-2.5.   IGrecia, [facility], have reviewed the note in full and agree with the assessment and plan.  04/02/19  10:15 AM

## 2019-04-02 LAB
ALBUMIN SERPL-MCNC: 4.1 G/DL (ref 3.5–5.2)
ALBUMIN/GLOB SERPL: 1.5 G/DL
ALP SERPL-CCNC: 62 U/L (ref 39–117)
ALT SERPL W P-5'-P-CCNC: 18 U/L (ref 1–33)
ANION GAP SERPL CALCULATED.3IONS-SCNC: 13 MMOL/L
AST SERPL-CCNC: 19 U/L (ref 1–32)
BILIRUB SERPL-MCNC: 0.2 MG/DL (ref 0.2–1.2)
BUN BLD-MCNC: 8 MG/DL (ref 6–20)
BUN/CREAT SERPL: 8.8 (ref 7–25)
CALCIUM SPEC-SCNC: 9 MG/DL (ref 8.6–10.5)
CHLORIDE SERPL-SCNC: 103 MMOL/L (ref 98–107)
CHOLEST SERPL-MCNC: 171 MG/DL (ref 0–200)
CO2 SERPL-SCNC: 23 MMOL/L (ref 22–29)
CREAT BLD-MCNC: 0.91 MG/DL (ref 0.57–1)
GFR SERPL CREATININE-BSD FRML MDRD: 71 ML/MIN/1.73
GLOBULIN UR ELPH-MCNC: 2.7 GM/DL
GLUCOSE BLD-MCNC: 93 MG/DL (ref 65–99)
HDLC SERPL-MCNC: 35 MG/DL (ref 40–60)
LDLC SERPL CALC-MCNC: 82 MG/DL (ref 0–100)
LDLC/HDLC SERPL: 2.34 {RATIO}
POTASSIUM BLD-SCNC: 4.1 MMOL/L (ref 3.5–5.2)
PROT SERPL-MCNC: 6.8 G/DL (ref 6–8.5)
SODIUM BLD-SCNC: 139 MMOL/L (ref 136–145)
TRIGL SERPL-MCNC: 271 MG/DL (ref 0–150)
VLDLC SERPL-MCNC: 54.2 MG/DL (ref 5–40)

## 2019-04-02 RX ORDER — WARFARIN SODIUM 2.5 MG/1
TABLET ORAL
Qty: 30 TABLET | Refills: 1 | Status: SHIPPED | OUTPATIENT
Start: 2019-04-02 | End: 2019-07-10 | Stop reason: SDUPTHER

## 2019-04-02 RX ORDER — WARFARIN SODIUM 5 MG/1
TABLET ORAL
Qty: 120 TABLET | Refills: 1 | Status: SHIPPED | OUTPATIENT
Start: 2019-04-02 | End: 2019-07-10 | Stop reason: SDUPTHER

## 2019-04-12 ENCOUNTER — LAB (OUTPATIENT)
Dept: LAB | Facility: HOSPITAL | Age: 34
End: 2019-04-12

## 2019-04-12 ENCOUNTER — ANTICOAGULATION VISIT (OUTPATIENT)
Dept: PHARMACY | Facility: HOSPITAL | Age: 34
End: 2019-04-12

## 2019-04-12 DIAGNOSIS — Z95.2 HX OF AORTIC VALVE REPLACEMENT, MECHANICAL: ICD-10-CM

## 2019-04-12 LAB
INR PPP: 1.91 (ref 0.9–1.1)
PROTHROMBIN TIME: 22.1 SECONDS (ref 11–15.4)

## 2019-04-12 PROCEDURE — 36415 COLL VENOUS BLD VENIPUNCTURE: CPT

## 2019-04-12 PROCEDURE — 85610 PROTHROMBIN TIME: CPT

## 2019-04-12 NOTE — PROGRESS NOTES
Anticoagulation Clinic - Remote Progress Note  REMOTE LAB    Indication: mechanical AVR (On-X)  Referring Provider: Latoya  Goal INR: 1.5-2.5  Current Drug Interactions: aspirin    Diet: one serving of something green twice weekly (salads with raw spinach, asparagus -- avoids broccoli, etc)  Alcohol: none  Tobacco: none  OTC Pain Medication: APAP PRN    INR History:  Date 8/16 8/25 9/6 9/15 10/2 10/16 10/30 11/20 12/8   Total Weekly Dose 57.5 mg 57.5 mg 52.5 mg 37.5-45 mg 52.5 mg 52.5 mg 52.5 mg 45mg 55mg   INR 2.76 2.77 2.70 1.7 1.55 2.04 2.93 1.42 2.55   Notes   fewer GLV miss dose x 2?  Thrive Thrive miss dose x 1 ill; dex / Roceph IM inj.     Date 12/26 1/9/18 1/22 2/6 2/22 3/13 4/5 4/16 4/24   Total Weekly Dose 55mg 52.5 mg 52.5mg 52.5mg 52.5 mg 45mg 50mg 47.5 mg 45mg   INR 2.72 2.3 2.35 2.68 2.3 2.55 2.72 2.57 2.34   Notes flu; Tamiflu     1 miss  2 miss ill; dex / Roceph IM inj.     Date 4/27 5/2 5/18 6/8 7/9 8/2 8/16 8/30  10/1 10/8 10/12   Total Weekly Dose 45mg 45mg 45mg 45mg 45mg 45mg 45mg 45mg  45mg 42.5mg 40mg   INR 2.37 2.03 2.05 2.16 1.83 2.84 2.26 2.38  3.03 3.35 1.96   Notes  Zpak        x1 reduced dose  inc GLV     Date 10/19 10/26 11/9 11/30 12/14 12/21 1/4/19 1/25 2/18 3/18 4/1   Total Weekly Dose 45mg 50mg 47.5  mg 47.5  mg 47.5  mg 40mg 42.5mg 42.5mg 45mg 45mg 42.5mg   INR 1.56 2.18 1.8 3.13 3.00 2.13 1.92 1.36 2.52 2.74 3.01   Notes    fewer GLV 1x decr dose 2x decr dose    melatonin      Date 4/12             Total Weekly Dose 40mg             INR 1.91             Notes                Phone Interview:  Tablet Strength: 5mg tablets  Patient Contact Info: [phone]   Lab Contact Info: [facility]    Patient Findings:   Negatives:  Signs/symptoms of thrombosis, Signs/symptoms of bleeding, Laboratory test error suspected, Change in health, Change in alcohol use, Change in activity, Upcoming invasive procedure, Emergency department visit, Upcoming dental procedure, Missed doses, Extra doses,  Change in medications, Change in diet/appetite, Hospital admission, Bruising, Other complaints   Comments:  No changes since last encounter.      Plan:   1. INR is therapeutic at 1.91. Instructed patient to continue decreased maintenance dose of warfarin 5mg daily except for 7.5mg on SunThurs   2. Repeat INR in ~1 week, 4/22.  3. Verbal information provided over the phone. Deisi Queen RBV dosing instructions, expresses understanding by teach back, and has no further questions at this time.    Danielle Joshi, PharmD  Pharmacy Resident  4/12/2019  2:34 PM

## 2019-04-23 ENCOUNTER — ANTICOAGULATION VISIT (OUTPATIENT)
Dept: PHARMACY | Facility: HOSPITAL | Age: 34
End: 2019-04-23

## 2019-04-23 ENCOUNTER — LAB (OUTPATIENT)
Dept: LAB | Facility: HOSPITAL | Age: 34
End: 2019-04-23

## 2019-04-23 DIAGNOSIS — Z95.2 HX OF AORTIC VALVE REPLACEMENT, MECHANICAL: ICD-10-CM

## 2019-04-23 LAB
INR PPP: 2.26 (ref 0.9–1.1)
PROTHROMBIN TIME: 25.2 SECONDS (ref 11–15.4)

## 2019-04-23 PROCEDURE — 36415 COLL VENOUS BLD VENIPUNCTURE: CPT

## 2019-04-23 PROCEDURE — 85610 PROTHROMBIN TIME: CPT

## 2019-04-23 NOTE — PROGRESS NOTES
Anticoagulation Clinic - Remote Progress Note  REMOTE LAB    Indication: mechanical AVR (On-X)  Referring Provider: Latoya  Goal INR: 1.5-2.5  Current Drug Interactions: aspirin    Diet: one serving of something green twice weekly (salads with raw spinach, asparagus -- avoids broccoli, etc)  Alcohol: none  Tobacco: none  OTC Pain Medication: APAP PRN    INR History:  Date 8/16 8/25 9/6 9/15 10/2 10/16 10/30 11/20 12/8   Total Weekly Dose 57.5 mg 57.5 mg 52.5 mg 37.5-45 mg 52.5 mg 52.5 mg 52.5 mg 45mg 55mg   INR 2.76 2.77 2.70 1.7 1.55 2.04 2.93 1.42 2.55   Notes   fewer GLV miss dose x 2?  Thrive Thrive miss dose x 1 ill; dex / Roceph IM inj.     Date 12/26 1/9/18 1/22 2/6 2/22 3/13 4/5 4/16 4/24   Total Weekly Dose 55mg 52.5 mg 52.5mg 52.5mg 52.5 mg 45mg 50mg 47.5 mg 45mg   INR 2.72 2.3 2.35 2.68 2.3 2.55 2.72 2.57 2.34   Notes flu; Tamiflu     1 miss  2 miss ill; dex / Roceph IM inj.     Date 4/27 5/2 5/18 6/8 7/9 8/2 8/16 8/30  10/1 10/8 10/12   Total Weekly Dose 45mg 45mg 45mg 45mg 45mg 45mg 45mg 45mg  45mg 42.5mg 40mg   INR 2.37 2.03 2.05 2.16 1.83 2.84 2.26 2.38  3.03 3.35 1.96   Notes  Zpak        x1 reduced dose  inc GLV     Date 10/19 10/26 11/9 11/30 12/14 12/21 1/4/19 1/25 2/18 3/18 4/1   Total Weekly Dose 45mg 50mg 47.5  mg 47.5  mg 47.5  mg 40mg 42.5mg 42.5mg 45mg 45mg 42.5mg   INR 1.56 2.18 1.8 3.13 3.00 2.13 1.92 1.36 2.52 2.74 3.01   Notes    fewer GLV 1x decr dose 2x decr dose    melatonin      Date 4/12 4/23            Total Weekly Dose 40mg 40mg 40mg           INR 1.91 2.26            Notes                Phone Interview:  Tablet Strength: 5mg tablets  Patient Contact Info: [phone]   Lab Contact Info: [facility]    Patient Findings:   Negatives:  Signs/symptoms of thrombosis, Signs/symptoms of bleeding, Laboratory test error suspected, Change in health, Change in alcohol use, Change in activity, Upcoming invasive procedure, Emergency department visit, Upcoming dental procedure, Missed  doses, Extra doses, Change in medications, Change in diet/appetite, Hospital admission, Bruising, Other complaints     Plan:   1. INR is therapeutic again today, so instructed Ms. Queen to continue warfarin 5mg daily except for 7.5mg on SunThurs (she was out and couldn't verify her exact dosing schedule at the time of our encounter -- requested that she call back to confirm).  2. Repeat INR in three weeks.  3. Verbal information provided over the phone. Deisi Queen RBV dosing instructions, expresses understanding by teach back, and has no further questions at this time.    Ann Cowden Mayer, PharmD  4/23/2019  1:36 PM

## 2019-05-02 DIAGNOSIS — Z95.2 HX OF AORTIC VALVE REPLACEMENT, MECHANICAL: Primary | ICD-10-CM

## 2019-05-16 ENCOUNTER — LAB (OUTPATIENT)
Dept: LAB | Facility: HOSPITAL | Age: 34
End: 2019-05-16

## 2019-05-16 ENCOUNTER — ANTICOAGULATION VISIT (OUTPATIENT)
Dept: PHARMACY | Facility: HOSPITAL | Age: 34
End: 2019-05-16

## 2019-05-16 DIAGNOSIS — Z95.2 HX OF AORTIC VALVE REPLACEMENT, MECHANICAL: ICD-10-CM

## 2019-05-16 LAB
INR PPP: 1.49 (ref 0.9–1.1)
PROTHROMBIN TIME: 18.8 SECONDS (ref 11–15.4)

## 2019-05-16 PROCEDURE — 85610 PROTHROMBIN TIME: CPT

## 2019-05-16 PROCEDURE — 36415 COLL VENOUS BLD VENIPUNCTURE: CPT

## 2019-05-16 RX ORDER — FEXOFENADINE HCL 180 MG/1
180 TABLET ORAL DAILY PRN
Refills: 2 | COMMUNITY
Start: 2019-05-08

## 2019-05-16 RX ORDER — CEPHALEXIN 500 MG/1
1 CAPSULE ORAL 3 TIMES DAILY
Refills: 0 | COMMUNITY
Start: 2019-05-08 | End: 2019-05-18

## 2019-05-16 RX ORDER — FLUTICASONE PROPIONATE 50 MCG
1 SPRAY, SUSPENSION (ML) NASAL 2 TIMES DAILY PRN
Refills: 0 | COMMUNITY
Start: 2019-05-08

## 2019-05-16 NOTE — PROGRESS NOTES
Anticoagulation Clinic - Remote Progress Note  REMOTE LAB    Indication: mechanical AVR (On-X)  Referring Provider: Latoya  Goal INR: 1.5-2.5  Current Drug Interactions: aspirin    Diet: one serving of something green twice weekly (salads with raw spinach, asparagus -- avoids broccoli, etc)  Alcohol: none  Tobacco: none  OTC Pain Medication: APAP PRN    INR History:  Date 8/16 8/25 9/6 9/15 10/2 10/16 10/30 11/20 12/8   Total Weekly Dose 57.5 mg 57.5 mg 52.5 mg 37.5-45 mg 52.5 mg 52.5 mg 52.5 mg 45mg 55mg   INR 2.76 2.77 2.70 1.7 1.55 2.04 2.93 1.42 2.55   Notes   fewer GLV miss dose x 2?  Thrive Thrive miss dose x 1 ill; dex / Roceph IM inj.     Date 12/26 1/9/18 1/22 2/6 2/22 3/13 4/5 4/16 4/24   Total Weekly Dose 55mg 52.5 mg 52.5mg 52.5mg 52.5 mg 45mg 50mg 47.5 mg 45mg   INR 2.72 2.3 2.35 2.68 2.3 2.55 2.72 2.57 2.34   Notes flu; Tamiflu     1 miss  2 miss ill; dex / Roceph IM inj.     Date 4/27 5/2 5/18 6/8 7/9 8/2 8/16 8/30  10/1 10/8 10/12   Total Weekly Dose 45mg 45mg 45mg 45mg 45mg 45mg 45mg 45mg  45mg 42.5mg 40mg   INR 2.37 2.03 2.05 2.16 1.83 2.84 2.26 2.38  3.03 3.35 1.96   Notes  Zpak        x1 reduced dose  inc GLV     Date 10/19 10/26 11/9 11/30 12/14 12/21 1/4/19 1/25 2/18 3/18 4/1   Total Weekly Dose 45mg 50mg 47.5  mg 47.5  mg 47.5  mg 40mg 42.5mg 42.5mg 45mg 45mg 42.5mg   INR 1.56 2.18 1.8 3.13 3.00 2.13 1.92 1.36 2.52 2.74 3.01   Notes    fewer GLV 1x decr dose 2x decr dose    melatonin      Date 4/12 4/23 5/16           Total Weekly Dose 40mg 40mg 40mg 40mg          INR 1.91 2.26 1.49           Notes   Keflex 5/9, abx/steroid shot 5/16             Phone Interview:  Tablet Strength: 5mg tablets  Patient Contact Info: [phone]   Lab Contact Info: [facility]    Patient Findings:   Positives:  Change in health, Change in medications   Negatives:  Signs/symptoms of thrombosis, Signs/symptoms of bleeding, Laboratory test error suspected, Change in alcohol use, Change in activity, Upcoming  invasive procedure, Emergency department visit, Upcoming dental procedure, Missed doses, Extra doses, Change in diet/appetite, Hospital admission, Bruising, Other complaints   Comments:  Ms. Queen reports she started Keflex 500mg TID x10 days last Thursday for a double ear infection as well as Allegra (replaced Claritin) and Flonase. Today prior to lab draw, she received a steroid and antibiotic shot (unsure of names) for a probable sinus infection.       Plan:   1. INR is just slightly subtherapeutic today at 1.49. Given patient received steroid and antibiotic shot today, instructed Ms. Queen to continue warfarin 5mg daily except for 7.5mg on SunThurs for now.  2. Repeat INR in 1 week.  3. Verbal information provided over the phone. Deisi Queen RBV dosing instructions, expresses understanding by teach back, and has no further questions at this time.    Lior Little, PharmD, BCPS  5/16/2019  4:26 PM

## 2019-06-05 ENCOUNTER — ANTICOAGULATION VISIT (OUTPATIENT)
Dept: PHARMACY | Facility: HOSPITAL | Age: 34
End: 2019-06-05

## 2019-06-05 ENCOUNTER — LAB (OUTPATIENT)
Dept: LAB | Facility: HOSPITAL | Age: 34
End: 2019-06-05

## 2019-06-05 DIAGNOSIS — Z95.2 HX OF AORTIC VALVE REPLACEMENT, MECHANICAL: ICD-10-CM

## 2019-06-05 LAB
INR PPP: 1.59 (ref 0.9–1.1)
PROTHROMBIN TIME: 19.8 SECONDS (ref 11–15.4)

## 2019-06-05 PROCEDURE — 36415 COLL VENOUS BLD VENIPUNCTURE: CPT

## 2019-06-05 PROCEDURE — 85610 PROTHROMBIN TIME: CPT

## 2019-06-05 NOTE — PROGRESS NOTES
Anticoagulation Clinic - Remote Progress Note  REMOTE LAB    Indication: mechanical AVR (On-X)  Referring Provider: Latoya  Goal INR: 1.5-2.5  Current Drug Interactions: aspirin    Diet: one serving of something green twice weekly (salads with raw spinach, asparagus -- avoids broccoli, etc)  Alcohol: none  Tobacco: none  OTC Pain Medication: APAP PRN    INR History:  Date 8/16 8/25 9/6 9/15 10/2 10/16 10/30 11/20 12/8   Total Weekly Dose 57.5 mg 57.5 mg 52.5 mg 37.5-45 mg 52.5 mg 52.5 mg 52.5 mg 45mg 55mg   INR 2.76 2.77 2.70 1.7 1.55 2.04 2.93 1.42 2.55   Notes   fewer GLV miss dose x 2?  Thrive Thrive miss dose x 1 ill; dex / Roceph IM inj.     Date 12/26 1/9/18 1/22 2/6 2/22 3/13 4/5 4/16 4/24   Total Weekly Dose 55mg 52.5 mg 52.5mg 52.5mg 52.5 mg 45mg 50mg 47.5 mg 45mg   INR 2.72 2.3 2.35 2.68 2.3 2.55 2.72 2.57 2.34   Notes flu; Tamiflu     1 miss  2 miss ill; dex / Roceph IM inj.     Date 4/27 5/2 5/18 6/8 7/9 8/2 8/16 8/30  10/1 10/8 10/12   Total Weekly Dose 45mg 45mg 45mg 45mg 45mg 45mg 45mg 45mg  45mg 42.5mg 40mg   INR 2.37 2.03 2.05 2.16 1.83 2.84 2.26 2.38  3.03 3.35 1.96   Notes  Zpak        x1 reduced dose  inc GLV     Date 10/19 10/26 11/9 11/30 12/14 12/21 1/4/19 1/25 2/18 3/18 4/1   Total Weekly Dose 45mg 50mg 47.5  mg 47.5  mg 47.5  mg 40mg 42.5mg 42.5mg 45mg 45mg 42.5mg   INR 1.56 2.18 1.8 3.13 3.00 2.13 1.92 1.36 2.52 2.74 3.01   Notes    fewer GLV 1x decr dose 2x decr dose    melatonin      Date 4/12 4/23 5/16 6/5          Total Weekly Dose 40mg 40mg 40mg 40mg          INR 1.91 2.26 1.49 1.59          Notes   Keflex 5/9, abx/steroid shot 5/16             Phone Interview:  Tablet Strength: 5mg tablets  Patient Contact Info: [phone]   Lab Contact Info: [facility]    Patient Findings   Negatives:  Signs/symptoms of thrombosis, Signs/symptoms of bleeding, Laboratory test error suspected, Change in health, Change in alcohol use, Change in activity, Upcoming invasive procedure, Emergency  department visit, Upcoming dental procedure, Missed doses, Extra doses, Change in medications, Change in diet/appetite, Hospital admission, Bruising, Other complaints   Comments:  Mrs. Queen reports all patient findings to be negative. She may eat more lydia/ iceburg lettuce salads throughout the summer. She will call us if she notices an increase in consumption. Otherwise, will continue dosing.     Plan:   1. INR is just therapeutic today at 1.59. Instructed Ms. Queen to continue warfarin 5mg daily except for 7.5mg on SunThurs.  2. Repeat INR 3 weeks 6/26.  3. Verbal information provided over the phone. Deisi Queen RBV dosing instructions, expresses understanding by teach back, and has no further questions at this time.    Carmelita Morales, PharmD  6/5/2019  3:59 PM

## 2019-07-09 ENCOUNTER — TELEPHONE (OUTPATIENT)
Dept: PHARMACY | Facility: HOSPITAL | Age: 34
End: 2019-07-09

## 2019-07-09 ENCOUNTER — LAB (OUTPATIENT)
Dept: LAB | Facility: HOSPITAL | Age: 34
End: 2019-07-09

## 2019-07-09 DIAGNOSIS — Z95.2 HX OF AORTIC VALVE REPLACEMENT, MECHANICAL: ICD-10-CM

## 2019-07-09 LAB
INR PPP: 1.98 (ref 0.9–1.1)
PROTHROMBIN TIME: 23.5 SECONDS (ref 11–15.4)

## 2019-07-09 PROCEDURE — 36415 COLL VENOUS BLD VENIPUNCTURE: CPT

## 2019-07-09 PROCEDURE — 85610 PROTHROMBIN TIME: CPT

## 2019-07-09 NOTE — TELEPHONE ENCOUNTER
Spoke with patient re:overdue PT/INR lab draw. Patient reports she had been out of town on vacation, but will have INR drawn ASAP.

## 2019-07-10 ENCOUNTER — ANTICOAGULATION VISIT (OUTPATIENT)
Dept: PHARMACY | Facility: HOSPITAL | Age: 34
End: 2019-07-10

## 2019-07-10 DIAGNOSIS — Z95.2 HX OF AORTIC VALVE REPLACEMENT, MECHANICAL: ICD-10-CM

## 2019-07-10 NOTE — PROGRESS NOTES
Anticoagulation Clinic - Remote Progress Note  REMOTE LAB    Indication: mechanical AVR (On-X)  Referring Provider: Latoya  Goal INR: 1.5-2.5  Current Drug Interactions: aspirin; melatonin    Diet: one serving of something green twice weekly (salads with raw spinach, asparagus -- avoids broccoli, etc)  Alcohol: none  Tobacco: none  OTC Pain Medication: APAP PRN    INR History:  Date 8/16 8/25 9/6 9/15 10/2 10/16 10/30 11/20 12/8   Total Weekly Dose 57.5 mg 57.5 mg 52.5 mg 37.5-45 mg 52.5 mg 52.5 mg 52.5 mg 45mg 55mg   INR 2.76 2.77 2.70 1.7 1.55 2.04 2.93 1.42 2.55   Notes   fewer GLV miss dose x 2?  Thrive Thrive miss dose x 1 ill; dex / Roceph IM inj.     Date 12/26 1/9/18 1/22 2/6 2/22 3/13 4/5 4/16 4/24   Total Weekly Dose 55mg 52.5 mg 52.5mg 52.5mg 52.5 mg 45mg 50mg 47.5 mg 45mg   INR 2.72 2.3 2.35 2.68 2.3 2.55 2.72 2.57 2.34   Notes flu; Tamiflu     1 miss  2 miss ill; dex / Roceph IM inj.     Date 4/27 5/2 5/18 6/8 7/9 8/2 8/16 8/30  10/1 10/8 10/12   Total Weekly Dose 45mg 45mg 45mg 45mg 45mg 45mg 45mg 45mg  45mg 42.5mg 40mg   INR 2.37 2.03 2.05 2.16 1.83 2.84 2.26 2.38  3.03 3.35 1.96   Notes  Zpak        x1 reduced dose  inc GLV     Date 10/19 10/26 11/9 11/30 12/14 12/21 1/4/19 1/25 2/18 3/18 4/1   Total Weekly Dose 45mg 50mg 47.5  mg 47.5  mg 47.5  mg 40mg 42.5mg 42.5mg 45mg 45mg 42.5mg   INR 1.56 2.18 1.8 3.13 3.00 2.13 1.92 1.36 2.52 2.74 3.01   Notes    fewer GLV 1x decr dose 2x decr dose    melatonin      Date 4/12 4/23 5/16 6/5 7/9         Total Weekly Dose 40mg 40mg 40mg 40mg 40mg         INR 1.91 2.26 1.49 1.59 1.98         Notes   Keflex 5/9, abx/steroid shot 5/16             Phone Interview:  Tablet Strength: 2.5mg and 5mg tablets  Patient Contact Info: [phone]   Lab Contact Info:  [facility]    Patient Findings   Negatives:  Signs/symptoms of thrombosis, Signs/symptoms of bleeding, Laboratory test error suspected, Change in health, Change in alcohol use, Change in activity, Upcoming  invasive procedure, Emergency department visit, Upcoming dental procedure, Missed doses, Extra doses, Change in medications, Change in diet/appetite, Hospital admission, Bruising, Other complaints   Comments:  Med list has been reviewed and updated. Patient is no longer (and has not for some time) taking Vit D, may consider taking it again come winter. Fexofenadine and Flonase have been clarified that she takes them PRN for allergies. Melatonin has been clarified that she takes QPM (vs previously QPM PRN).     Plan:   1. INR is just therapeutic today at 1.98. Instructed Ms. Queen to continue warfarin 5mg daily except for 7.5mg on SunThurs.  2. Repeat INR in 4 weeks  3. Verbal information provided over the phone. Deisi Queen RBV dosing instructions, expresses understanding by teach back, and has no further questions at this time.  4. Requests warfarin refills on both her 2.5mg and 5mg tablets to be called into [facility] in [place], [place]. Separate encounter created.  5. Medication list has been reviewed and updated. [place] requested    Genaro Rodriguez CPhT  7/10/2019  11:16 AM     Melatonin DDI with warfarin can cause increase risk of bleeding. Have discussed with patient at encounter from 3/18.  Ivent placed.  Rx sent in for both 5mg and 2.5mg of warfarin.  I, Carmelita Morales, PharmD, have reviewed the note in full and agree with the assessment and plan.  07/11/19  9:48 AM

## 2019-07-11 RX ORDER — WARFARIN SODIUM 2.5 MG/1
TABLET ORAL
Qty: 30 TABLET | Refills: 1 | Status: SHIPPED | OUTPATIENT
Start: 2019-07-11 | End: 2019-12-19 | Stop reason: SDUPTHER

## 2019-07-11 RX ORDER — WARFARIN SODIUM 5 MG/1
TABLET ORAL
Qty: 120 TABLET | Refills: 1 | Status: SHIPPED | OUTPATIENT
Start: 2019-07-11 | End: 2019-12-19 | Stop reason: SDUPTHER

## 2019-08-05 ENCOUNTER — ANTICOAGULATION VISIT (OUTPATIENT)
Dept: PHARMACY | Facility: HOSPITAL | Age: 34
End: 2019-08-05

## 2019-08-05 ENCOUNTER — LAB (OUTPATIENT)
Dept: LAB | Facility: HOSPITAL | Age: 34
End: 2019-08-05

## 2019-08-05 DIAGNOSIS — Z95.2 HX OF AORTIC VALVE REPLACEMENT, MECHANICAL: ICD-10-CM

## 2019-08-05 LAB
INR PPP: 1.91 (ref 0.9–1.1)
PROTHROMBIN TIME: 22.8 SECONDS (ref 11–15.4)

## 2019-08-05 PROCEDURE — 36415 COLL VENOUS BLD VENIPUNCTURE: CPT

## 2019-08-05 PROCEDURE — 85610 PROTHROMBIN TIME: CPT

## 2019-08-05 NOTE — PROGRESS NOTES
Anticoagulation Clinic - Remote Progress Note  REMOTE LAB    Indication: mechanical AVR (On-X)  Referring Provider: Latoya  Goal INR: 1.5-2.5  Current Drug Interactions: aspirin; melatonin    Diet: one serving of something green twice weekly (salads with raw spinach, asparagus -- avoids broccoli, etc)  Alcohol: none  Tobacco: none  OTC Pain Medication: APAP PRN    INR History:  Date 8/16 8/25 9/6 9/15 10/2 10/16 10/30 11/20 12/8   Total Weekly Dose 57.5 mg 57.5 mg 52.5 mg 37.5-45 mg 52.5 mg 52.5 mg 52.5 mg 45mg 55mg   INR 2.76 2.77 2.70 1.7 1.55 2.04 2.93 1.42 2.55   Notes   fewer GLV miss dose x 2?  Thrive Thrive miss dose x 1 ill; dex / Roceph IM inj.     Date 12/26 1/9/18 1/22 2/6 2/22 3/13 4/5 4/16 4/24   Total Weekly Dose 55mg 52.5 mg 52.5mg 52.5mg 52.5 mg 45mg 50mg 47.5 mg 45mg   INR 2.72 2.3 2.35 2.68 2.3 2.55 2.72 2.57 2.34   Notes flu; Tamiflu     1 miss  2 miss ill; dex / Roceph IM inj.     Date 4/27 5/2 5/18 6/8 7/9 8/2 8/16 8/30  10/1 10/8 10/12   Total Weekly Dose 45mg 45mg 45mg 45mg 45mg 45mg 45mg 45mg  45mg 42.5mg 40mg   INR 2.37 2.03 2.05 2.16 1.83 2.84 2.26 2.38  3.03 3.35 1.96   Notes  Zpak        x1 reduced dose  inc GLV     Date 10/19 10/26 11/9 11/30 12/14 12/21 1/4/19 1/25 2/18 3/18 4/1   Total Weekly Dose 45mg 50mg 47.5  mg 47.5  mg 47.5  mg 40mg 42.5mg 42.5mg 45mg 45mg 42.5mg   INR 1.56 2.18 1.8 3.13 3.00 2.13 1.92 1.36 2.52 2.74 3.01   Notes    fewer GLV 1x decr dose 2x decr dose    melatonin      Date 4/12 4/23 5/16 6/5 7/9 8/2        Total Weekly Dose 40mg 40mg 40mg 40mg 40mg 40mg        INR 1.91 2.26 1.49 1.59 1.98 1.91        Notes   Keflex 5/9, abx/steroid shot 5/16             Phone Interview:  Tablet Strength: 2.5mg and 5mg tablets  Patient Contact Info: [phone]   Lab Contact Info:  [facility]    Patient Findings   Negatives:  Signs/symptoms of thrombosis, Signs/symptoms of bleeding, Laboratory test error suspected, Change in health, Change in alcohol use, Change in activity,  Upcoming invasive procedure, Emergency department visit, Upcoming dental procedure, Missed doses, Extra doses, Change in medications, Change in diet/appetite, Hospital admission, Bruising, Other complaints     Plan:   1. INR is therapeutic today at 1.91. Instructed Ms. Queen to continue warfarin 5mg daily except for 7.5mg on SunThurs.  2. Repeat INR in 4 weeks, 9/2  3. Verbal information provided over the phone. Deisi Queen RBV dosing instructions, expresses understanding by teach back, and has no further questions at this time.    Genaro Rodriguez, [place]  8/5/2019  10:52 AM    I, Carmelita Morales, PharmD, have reviewed the note in full and agree with the assessment and plan.  08/08/19  4:47 PM

## 2019-09-13 ENCOUNTER — TELEPHONE (OUTPATIENT)
Dept: PHARMACY | Facility: HOSPITAL | Age: 34
End: 2019-09-13

## 2019-09-19 ENCOUNTER — LAB (OUTPATIENT)
Dept: LAB | Facility: HOSPITAL | Age: 34
End: 2019-09-19

## 2019-09-19 ENCOUNTER — ANTICOAGULATION VISIT (OUTPATIENT)
Dept: PHARMACY | Facility: HOSPITAL | Age: 34
End: 2019-09-19

## 2019-09-19 DIAGNOSIS — Z95.2 HX OF AORTIC VALVE REPLACEMENT, MECHANICAL: ICD-10-CM

## 2019-09-19 LAB
INR PPP: 2.05 (ref 0.9–1.1)
PROTHROMBIN TIME: 24.2 SECONDS (ref 11–15.4)

## 2019-09-19 PROCEDURE — 85610 PROTHROMBIN TIME: CPT

## 2019-09-19 PROCEDURE — 36415 COLL VENOUS BLD VENIPUNCTURE: CPT

## 2019-09-19 NOTE — PROGRESS NOTES
Anticoagulation Clinic - Remote Progress Note  REMOTE LAB    Indication: mechanical AVR (On-X)  Referring Provider: Latoya  Goal INR: 1.5-2.5  Current Drug Interactions: aspirin; melatonin    Diet: one serving of something green twice weekly (salads with raw spinach, asparagus -- avoids broccoli, etc)  Alcohol: none  Tobacco: none  OTC Pain Medication: APAP PRN    INR History:  Date 8/16 8/25 9/6 9/15 10/2 10/16 10/30 11/20 12/8   Total Weekly Dose 57.5 mg 57.5 mg 52.5 mg 37.5-45 mg 52.5 mg 52.5 mg 52.5 mg 45mg 55mg   INR 2.76 2.77 2.70 1.7 1.55 2.04 2.93 1.42 2.55   Notes   fewer GLV miss dose x 2?  Thrive Thrive miss dose x 1 ill; dex / Roceph IM inj.     Date 12/26 1/9/18 1/22 2/6 2/22 3/13 4/5 4/16 4/24   Total Weekly Dose 55mg 52.5 mg 52.5mg 52.5mg 52.5 mg 45mg 50mg 47.5 mg 45mg   INR 2.72 2.3 2.35 2.68 2.3 2.55 2.72 2.57 2.34   Notes flu; Tamiflu     1 miss  2 miss ill; dex / Roceph IM inj.     Date 4/27 5/2 5/18 6/8 7/9 8/2 8/16 8/30  10/1 10/8 10/12   Total Weekly Dose 45mg 45mg 45mg 45mg 45mg 45mg 45mg 45mg  45mg 42.5mg 40mg   INR 2.37 2.03 2.05 2.16 1.83 2.84 2.26 2.38  3.03 3.35 1.96   Notes  Zpak        x1 reduced dose  inc GLV     Date 10/19 10/26 11/9 11/30 12/14 12/21 1/4/19 1/25 2/18 3/18 4/1   Total Weekly Dose 45mg 50mg 47.5  mg 47.5  mg 47.5  mg 40mg 42.5mg 42.5mg 45mg 45mg 42.5mg   INR 1.56 2.18 1.8 3.13 3.00 2.13 1.92 1.36 2.52 2.74 3.01   Notes    fewer GLV 1x decr dose 2x decr dose    melatonin      Date 4/12 4/23 5/16 6/5 7/9 8/2 9/19       Total Weekly Dose 40mg 40mg 40mg 40mg 40mg 40mg 40mg       INR 1.91 2.26 1.49 1.59 1.98 1.91 2.05       Notes   Keflex 5/9, abx/steroid shot 5/16             Phone Interview:  Tablet Strength: 2.5mg and 5mg tablets  Patient Contact Info: [phone]   Lab Contact Info: [facility]    Patient Findings   Negatives:  Signs/symptoms of thrombosis, Signs/symptoms of bleeding, Laboratory test error suspected, Change in health, Change in alcohol use, Change in  activity, Upcoming invasive procedure, Emergency department visit, Upcoming dental procedure, Missed doses, Extra doses, Change in medications, Change in diet/appetite, Hospital admission, Bruising, Other complaints     Plan:   1. INR is therapeutic today at 2.05. Instructed Ms. Queen to continue warfarin 5mg daily except for 7.5mg on SunThurs.  2. Repeat INR in 4 weeks, 10/17  3. Verbal information provided over the phone. Deisi Queen RBV dosing instructions, expresses understanding by teach back, and has no further questions at this time.    Genaro Rodriguez CPhT  9/19/2019  1:12 PM     [facility], have reviewed the note in full and agree with the assessment and plan.  09/19/19  1:20 PM

## 2019-10-29 ENCOUNTER — ANTICOAGULATION VISIT (OUTPATIENT)
Dept: PHARMACY | Facility: HOSPITAL | Age: 34
End: 2019-10-29

## 2019-10-29 ENCOUNTER — LAB (OUTPATIENT)
Dept: LAB | Facility: HOSPITAL | Age: 34
End: 2019-10-29

## 2019-10-29 DIAGNOSIS — Z95.2 HX OF AORTIC VALVE REPLACEMENT, MECHANICAL: ICD-10-CM

## 2019-10-29 LAB
INR PPP: 1.93 (ref 0.9–1.1)
PROTHROMBIN TIME: 23 SECONDS (ref 11–15.4)

## 2019-10-29 PROCEDURE — 36415 COLL VENOUS BLD VENIPUNCTURE: CPT

## 2019-10-29 PROCEDURE — 85610 PROTHROMBIN TIME: CPT

## 2019-10-29 NOTE — PROGRESS NOTES
Anticoagulation Clinic - Remote Progress Note  REMOTE LAB    Indication: mechanical AVR (On-X)  Referring Provider: Latoya (Last seen: 11/19/18  next appt: 11/25/19)  Goal INR: 1.5-2.5  Current Drug Interactions: aspirin; melatonin    Diet: one serving of something green twice weekly (salads with raw spinach, asparagus -- avoids broccoli, etc)  Alcohol: none  Tobacco: none  OTC Pain Medication: APAP PRN    INR History:  Date 8/16 8/25 9/6 9/15 10/2 10/16 10/30 11/20 12/8   Total Weekly Dose 57.5 mg 57.5 mg 52.5 mg 37.5-45 mg 52.5 mg 52.5 mg 52.5 mg 45mg 55mg   INR 2.76 2.77 2.70 1.7 1.55 2.04 2.93 1.42 2.55   Notes   fewer GLV miss dose x 2?  Thrive Thrive miss dose x 1 ill; dex / Roceph IM inj.     Date 12/26 1/9/18 1/22 2/6 2/22 3/13 4/5 4/16 4/24   Total Weekly Dose 55mg 52.5 mg 52.5mg 52.5mg 52.5 mg 45mg 50mg 47.5 mg 45mg   INR 2.72 2.3 2.35 2.68 2.3 2.55 2.72 2.57 2.34   Notes flu; Tamiflu     1 miss  2 miss ill; dex / Roceph IM inj.     Date 4/27 5/2 5/18 6/8 7/9 8/2 8/16 8/30 10/1 10/8 10/12   Total Weekly Dose 45mg 45mg 45mg 45mg 45mg 45mg 45mg 45mg 45mg 42.5mg 40mg   INR 2.37 2.03 2.05 2.16 1.83 2.84 2.26 2.38 3.03 3.35 1.96   Notes  Zpak        x1 reduced dose  inc GLV     Date 10/19 10/26 11/9 11/30 12/14 12/21 1/4/19 1/25 2/18 3/18 4/1   Total Weekly Dose 45mg 50mg 47.5  mg 47.5  mg 47.5  mg 40mg 42.5mg 42.5mg 45mg 45mg 42.5mg   INR 1.56 2.18 1.8 3.13 3.00 2.13 1.92 1.36 2.52 2.74 3.01   Notes    fewer GLV 1x decr dose 2x decr dose    melatonin      Date 4/12 4/23 5/16 6/5 7/9 8/2 9/19 10/29      Total Weekly Dose 40mg 40mg 40mg 40mg 40mg 40mg 40mg 40mg      INR 1.91 2.26 1.49 1.59 1.98 1.91 2.05 1.93      Notes   Keflex 5/9, abx/steroid shot 5/16             Phone Interview:  Tablet Strength: 2.5mg and 5mg tablets  Patient Contact Info: [phone]   Lab Contact Info: [facility]    Patient Findings   Negatives:  Signs/symptoms of thrombosis, Signs/symptoms of bleeding, Laboratory test error  suspected, Change in health, Change in alcohol use, Change in activity, Upcoming invasive procedure, Emergency department visit, Upcoming dental procedure, Missed doses, Extra doses, Change in medications, Change in diet/appetite, Hospital admission, Bruising, Other complaints   Comments:  Patient denies all findings. Has f/u with Dr Klein in [place] on 11/25.     Plan:   1. INR is therapeutic today at 1.93. Instructed Mrs. Queen to continue warfarin 5mg daily except for 7.5mg on SunThurs.  2. Repeat INR in 4 weeks.  3. Verbal information provided over the phone. Deisi Queen RBV dosing instructions, expresses understanding by teach back, and has no further questions at this time.    Genaro Rodriguez CPhT  10/29/2019  1:57 PM     IGrec[facility], have reviewed the note in full and agree with the assessment and plan.  10/29/19  2:28 PM

## 2019-11-21 ENCOUNTER — LAB (OUTPATIENT)
Dept: LAB | Facility: HOSPITAL | Age: 34
End: 2019-11-21

## 2019-11-21 ENCOUNTER — TRANSCRIBE ORDERS (OUTPATIENT)
Dept: ADMINISTRATIVE | Facility: HOSPITAL | Age: 34
End: 2019-11-21

## 2019-11-21 ENCOUNTER — ANTICOAGULATION VISIT (OUTPATIENT)
Dept: PHARMACY | Facility: HOSPITAL | Age: 34
End: 2019-11-21

## 2019-11-21 DIAGNOSIS — Z95.2 HX OF AORTIC VALVE REPLACEMENT, MECHANICAL: ICD-10-CM

## 2019-11-21 DIAGNOSIS — E78.2 MIXED HYPERLIPIDEMIA: ICD-10-CM

## 2019-11-21 DIAGNOSIS — E78.2 MIXED HYPERLIPIDEMIA: Primary | ICD-10-CM

## 2019-11-21 LAB
ALBUMIN SERPL-MCNC: 4.3 G/DL (ref 3.5–5.2)
ALBUMIN/GLOB SERPL: 1.4 G/DL
ALP SERPL-CCNC: 76 U/L (ref 39–117)
ALT SERPL W P-5'-P-CCNC: 14 U/L (ref 1–33)
ANION GAP SERPL CALCULATED.3IONS-SCNC: 11.1 MMOL/L (ref 5–15)
AST SERPL-CCNC: 13 U/L (ref 1–32)
BASOPHILS # BLD AUTO: 0.06 10*3/MM3 (ref 0–0.2)
BASOPHILS NFR BLD AUTO: 0.9 % (ref 0–1.5)
BILIRUB SERPL-MCNC: 0.2 MG/DL (ref 0.2–1.2)
BUN BLD-MCNC: 13 MG/DL (ref 6–20)
BUN/CREAT SERPL: 12.1 (ref 7–25)
CALCIUM SPEC-SCNC: 9 MG/DL (ref 8.6–10.5)
CHLORIDE SERPL-SCNC: 104 MMOL/L (ref 98–107)
CHOLEST SERPL-MCNC: 178 MG/DL (ref 0–200)
CO2 SERPL-SCNC: 25.9 MMOL/L (ref 22–29)
CREAT BLD-MCNC: 1.07 MG/DL (ref 0.57–1)
DEPRECATED RDW RBC AUTO: 43.5 FL (ref 37–54)
EOSINOPHIL # BLD AUTO: 0.16 10*3/MM3 (ref 0–0.4)
EOSINOPHIL NFR BLD AUTO: 2.4 % (ref 0.3–6.2)
ERYTHROCYTE [DISTWIDTH] IN BLOOD BY AUTOMATED COUNT: 13.6 % (ref 12.3–15.4)
GFR SERPL CREATININE-BSD FRML MDRD: 58 ML/MIN/1.73
GLOBULIN UR ELPH-MCNC: 3 GM/DL
GLUCOSE BLD-MCNC: 90 MG/DL (ref 65–99)
HCT VFR BLD AUTO: 41 % (ref 34–46.6)
HDLC SERPL-MCNC: 36 MG/DL (ref 40–60)
HGB BLD-MCNC: 13.7 G/DL (ref 12–15.9)
IMM GRANULOCYTES # BLD AUTO: 0.02 10*3/MM3 (ref 0–0.05)
IMM GRANULOCYTES NFR BLD AUTO: 0.3 % (ref 0–0.5)
INR PPP: 1.94 (ref 0.9–1.1)
LDLC SERPL CALC-MCNC: 107 MG/DL (ref 0–100)
LDLC/HDLC SERPL: 2.98 {RATIO}
LYMPHOCYTES # BLD AUTO: 2.33 10*3/MM3 (ref 0.7–3.1)
LYMPHOCYTES NFR BLD AUTO: 34.9 % (ref 19.6–45.3)
MCH RBC QN AUTO: 29.5 PG (ref 26.6–33)
MCHC RBC AUTO-ENTMCNC: 33.4 G/DL (ref 31.5–35.7)
MCV RBC AUTO: 88.4 FL (ref 79–97)
MONOCYTES # BLD AUTO: 0.35 10*3/MM3 (ref 0.1–0.9)
MONOCYTES NFR BLD AUTO: 5.2 % (ref 5–12)
NEUTROPHILS # BLD AUTO: 3.76 10*3/MM3 (ref 1.7–7)
NEUTROPHILS NFR BLD AUTO: 56.3 % (ref 42.7–76)
NRBC BLD AUTO-RTO: 0 /100 WBC (ref 0–0.2)
PLATELET # BLD AUTO: 218 10*3/MM3 (ref 140–450)
PMV BLD AUTO: 9.7 FL (ref 6–12)
POTASSIUM BLD-SCNC: 4.2 MMOL/L (ref 3.5–5.2)
PROT SERPL-MCNC: 7.3 G/DL (ref 6–8.5)
PROTHROMBIN TIME: 23.1 SECONDS (ref 11–15.4)
RBC # BLD AUTO: 4.64 10*6/MM3 (ref 3.77–5.28)
SODIUM BLD-SCNC: 141 MMOL/L (ref 136–145)
TRIGL SERPL-MCNC: 173 MG/DL (ref 0–150)
VLDLC SERPL-MCNC: 34.6 MG/DL (ref 5–40)
WBC NRBC COR # BLD: 6.68 10*3/MM3 (ref 3.4–10.8)

## 2019-11-21 PROCEDURE — 85610 PROTHROMBIN TIME: CPT

## 2019-11-21 PROCEDURE — 85025 COMPLETE CBC W/AUTO DIFF WBC: CPT

## 2019-11-21 PROCEDURE — 80061 LIPID PANEL: CPT | Performed by: PHYSICIAN ASSISTANT

## 2019-11-21 PROCEDURE — 36415 COLL VENOUS BLD VENIPUNCTURE: CPT

## 2019-11-21 PROCEDURE — 80053 COMPREHEN METABOLIC PANEL: CPT

## 2019-11-21 NOTE — PROGRESS NOTES
Anticoagulation Clinic - Remote Progress Note  REMOTE LAB    Indication: mechanical AVR (On-X)  Referring Provider: Latoya (Last seen: 11/19/18  next appt: 11/25/19)  Goal INR: 1.5-2.5  Current Drug Interactions: aspirin; melatonin    Diet: one serving of something green twice weekly (salads with raw spinach, asparagus -- avoids broccoli, etc)  Alcohol: none  Tobacco: none  OTC Pain Medication: APAP PRN    INR History:  Date 8/16 8/25 9/6 9/15 10/2 10/16 10/30 11/20 12/8   Total Weekly Dose 57.5 mg 57.5 mg 52.5 mg 37.5-45 mg 52.5 mg 52.5 mg 52.5 mg 45mg 55mg   INR 2.76 2.77 2.70 1.7 1.55 2.04 2.93 1.42 2.55   Notes   fewer GLV miss dose x 2?  Thrive Thrive miss dose x 1 ill; dex / Roceph IM inj.     Date 12/26 1/9/18 1/22 2/6 2/22 3/13 4/5 4/16 4/24   Total Weekly Dose 55mg 52.5 mg 52.5mg 52.5mg 52.5 mg 45mg 50mg 47.5 mg 45mg   INR 2.72 2.3 2.35 2.68 2.3 2.55 2.72 2.57 2.34   Notes flu; Tamiflu     1 miss  2 miss ill; dex / Roceph IM inj.     Date 4/27 5/2 5/18 6/8 7/9 8/2 8/16 8/30 10/1 10/8 10/12   Total Weekly Dose 45mg 45mg 45mg 45mg 45mg 45mg 45mg 45mg 45mg 42.5mg 40mg   INR 2.37 2.03 2.05 2.16 1.83 2.84 2.26 2.38 3.03 3.35 1.96   Notes  Zpak        x1 reduced dose  inc GLV     Date 10/19 10/26 11/9 11/30 12/14 12/21 1/4/19 1/25 2/18 3/18 4/1   Total Weekly Dose 45mg 50mg 47.5  mg 47.5  mg 47.5  mg 40mg 42.5mg 42.5mg 45mg 45mg 42.5mg   INR 1.56 2.18 1.8 3.13 3.00 2.13 1.92 1.36 2.52 2.74 3.01   Notes    fewer GLV 1x decr dose 2x decr dose    melatonin      Date 4/12 4/23 5/16 6/5 7/9 8/2 9/19 10/29 11/21     Total Weekly Dose 40mg 40mg 40mg 40mg 40mg 40mg 40mg 40mg 40mg 40mg    INR 1.91 2.26 1.49 1.59 1.98 1.91 2.05 1.93 1.94     Notes   Keflex 5/9, abx/steroid shot 5/16       7 days of amoxicillin      Phone Interview:  Tablet Strength: 2.5mg and 5mg tablets  Patient Contact Info: [phone]   Lab Contact Info: [facility]    Patient Findings   Positives:  Change in medications   Negatives:   Signs/symptoms of thrombosis, Signs/symptoms of bleeding, Laboratory test error suspected, Change in health, Change in alcohol use, Change in activity, Upcoming invasive procedure, Emergency department visit, Upcoming dental procedure, Missed doses, Extra doses, Change in diet/appetite, Hospital admission, Bruising, Other complaints   Comments:  Patient started amoxicillin last Saturday or Sunday, has not noticed any increase in bleeding/bruising. Patient will be on antibiotic through the weekend. Discussed checking INR with Mrs. Queen sooner than usual to be sure interaction does not increase INR too greatly.          Plan:   1. INR is therapeutic today at 1.94. Mrs. Queen reported starting a 7-day course of amoxicillin this past weekend, discussed checking INR at a more frequent interval this visit due to potential interaction between amoxicillin and warfarin. She agreed. Instructed Mrs. Queen to continue warfarin 5mg daily except for 7.5mg on SunThurs.  2. Repeat INR in 2 weeks on 12/5.  3. Verbal information provided over the phone. Deisi Queen RBV dosing instructions, expresses understanding by teach back, and has no further questions at this time.    Roxane Betancur, PharmD  Pharmacy Resident  11/21/2019  1:16 PM

## 2019-11-25 ENCOUNTER — OFFICE VISIT (OUTPATIENT)
Dept: CARDIOLOGY | Facility: CLINIC | Age: 34
End: 2019-11-25

## 2019-11-25 VITALS
HEIGHT: 62 IN | HEART RATE: 68 BPM | OXYGEN SATURATION: 97 % | SYSTOLIC BLOOD PRESSURE: 114 MMHG | DIASTOLIC BLOOD PRESSURE: 76 MMHG | BODY MASS INDEX: 33.68 KG/M2 | WEIGHT: 183 LBS

## 2019-11-25 DIAGNOSIS — Z95.2 HX OF AORTIC VALVE REPLACEMENT, MECHANICAL: ICD-10-CM

## 2019-11-25 DIAGNOSIS — Q23.1 BICUSPID AORTIC VALVE: Primary | ICD-10-CM

## 2019-11-25 DIAGNOSIS — E78.5 DYSLIPIDEMIA: ICD-10-CM

## 2019-11-25 PROCEDURE — 99213 OFFICE O/P EST LOW 20 MIN: CPT | Performed by: INTERNAL MEDICINE

## 2019-11-25 NOTE — PROGRESS NOTES
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        PROBLEM LIST:   1. Bicuspid aortic valve with combined aortic stenosis/aortic insufficiency.  a. In 2005, remote onset of auscultated murmur by primary care physician with ELVIRA per Dr. Ortiz with bicuspid findings.   b. 9/16 #25 AVR On X Dacron tube  graft & root replacement  w coronary reimplantation-Dr Bazzi  1. periop tamponade requiring window  c. 11/17 echo acceptable AVR , EF 60%  d.  AV1/19/2018 echo: EF 60%, concentric LVH, normal function mechanical AV  2. Atypical chest pain.  a. On 4/16/2012, GXT stress test revealing 12.8 MET exercise. T-wave inversion in inferior leads and in leads 4 - leads 6 and no exercise induced arrhythmias.  3. Palpitations.   a. On 4/17/2012, 24-hour Holter monitor revealing heart rate ranging from  beats per minute with 56 PVCs, no pauses.  4. HLD  a. 11/21/2019 lipids:   HDL 36   5. Childhood asthma.  6. Surgical history: None.  7. G2, P3      Chief Complaint   Patient presents with   • nonrheumatic aortic valve stenosis       Allergies  Allergies   Allergen Reactions   • Flagyl [Metronidazole] Itching       Current Medications    Current Outpatient Medications:   •  fexofenadine (ALLEGRA) 180 MG tablet, Take 180 mg by mouth Daily As Needed. For allergies, Disp: , Rfl: 2  •  fluticasone (FLONASE) 50 MCG/ACT nasal spray, 1 spray into the nostril(s) as directed by provider 2 (Two) Times a Day As Needed., Disp: , Rfl: 0  •  melatonin 5 MG tablet tablet, Take 5 mg by mouth Daily., Disp: , Rfl:   •  Multiple Vitamins-Minerals (MULTI ADULT GUMMIES PO), Take  by mouth Daily., Disp: , Rfl:   •  warfarin (COUMADIN) 2.5 MG tablet, Take one tablet by mouth daily, or as directed by the anticoagulation clinic.,  "Disp: 30 tablet, Rfl: 1  •  warfarin (COUMADIN) 5 MG tablet, Take 1-1.5 tablet(s) by mouth daily, as directed by the anticoagulation clinic., Disp: 120 tablet, Rfl: 1      History of Present Illness   Deisi Queen is a 34 y.o. year old female here for follow up. She is 3 years s/p mechanical AVR. Follows w antico clinic, warfarin dose has been stable. She is walking some for exercise with no limitations.  Weight is up 5 lbs. Pt denies any chest pain, dyspnea, dyspnea on exertion, orthopnea, PND, palpitations, lower extremity edema, or claudication. BP is stable. Recent lipids showed slightly elevated triglycerides.      OBJECTIVE:  Vitals:    11/25/19 0837   BP: 114/76   BP Location: Right arm   Patient Position: Sitting   Pulse: 68   SpO2: 97%   Weight: 83 kg (183 lb)   Height: 157.5 cm (62\")     Physical Exam   Constitutional: She is oriented to person, place, and time. She appears well-developed and well-nourished. No distress.   overweight   Cardiovascular: Normal rate, regular rhythm, normal heart sounds and intact distal pulses.   No murmur heard.  Pulses:       Radial pulses are 2+ on the right side, and 2+ on the left side.        Dorsalis pedis pulses are 2+ on the right side, and 2+ on the left side.        Posterior tibial pulses are 2+ on the right side, and 2+ on the left side.   Mechanical click   Pulmonary/Chest: Effort normal and breath sounds normal. She has no wheezes. She has no rales.   Abdominal: Soft. Bowel sounds are normal. There is no tenderness. There is no guarding.   Musculoskeletal: She exhibits no edema or tenderness.   Neurological: She is alert and oriented to person, place, and time.   Skin: Skin is warm and dry. No rash noted.   Psychiatric: She has a normal mood and affect.   Nursing note and vitals reviewed.      Diagnostic Data:  Procedures      ASSESSMENT:   Diagnosis Plan   1. Bicuspid aortic valve  Adult Transthoracic Echo Complete W/ Cont if Necessary Per Protocol   2. Hx " of aortic valve replacement, mechanical [Z95.2]  Adult Transthoracic Echo Complete W/ Cont if Necessary Per Protocol   3. Dyslipidemia         PLAN:  1. 3 years s/p mechanical AVR on coumadin per [facility] clinic. Will follow w echo next year.  2. Patient counseled elevated triglycerides can precursor of insulin resistance.  Counseled on limiting starch rich foods, counseled on getting regular exercise.  Counseled on dementia risk reduction with exercise.      Hans Mary PA, thank you for referring Ms. Queen for evaluation.  I have forwarded my electronically generated recommendations to you for review.  Please do not hesitate to call with any questions.    Scribed for Niels Klein MD by Azul Rivera PA-C. 11/25/2019  10:03 AM   I, Niels Klein MD, personally performed the services described in this documentation as scribed by the above named individual in my presence, and it is both accurate and complete.  11/25/2019  10:03 AM  Niels Klein MD, FACC

## 2019-12-19 ENCOUNTER — ANTICOAGULATION VISIT (OUTPATIENT)
Dept: PHARMACY | Facility: HOSPITAL | Age: 34
End: 2019-12-19

## 2019-12-19 ENCOUNTER — LAB (OUTPATIENT)
Dept: LAB | Facility: HOSPITAL | Age: 34
End: 2019-12-19

## 2019-12-19 DIAGNOSIS — Z95.2 HX OF AORTIC VALVE REPLACEMENT, MECHANICAL: ICD-10-CM

## 2019-12-19 LAB
INR PPP: 1.99 (ref 0.9–1.1)
PROTHROMBIN TIME: 23.6 SECONDS (ref 11–15.4)

## 2019-12-19 PROCEDURE — 85610 PROTHROMBIN TIME: CPT

## 2019-12-19 PROCEDURE — 36415 COLL VENOUS BLD VENIPUNCTURE: CPT

## 2019-12-19 RX ORDER — WARFARIN SODIUM 2.5 MG/1
TABLET ORAL
Qty: 30 TABLET | Refills: 0 | Status: SHIPPED | OUTPATIENT
Start: 2019-12-19 | End: 2020-02-20 | Stop reason: SDUPTHER

## 2019-12-19 RX ORDER — WARFARIN SODIUM 5 MG/1
TABLET ORAL
Qty: 120 TABLET | Refills: 0 | Status: SHIPPED | OUTPATIENT
Start: 2019-12-19 | End: 2020-09-03

## 2019-12-19 NOTE — PROGRESS NOTES
Anticoagulation Clinic - Remote Progress Note  REMOTE LAB    Indication: mechanical AVR (On-X)  Referring Provider: Latoya (Last seen: 11/19/18  next appt: 11/25/19)  Goal INR: 1.5-2.5  Current Drug Interactions: aspirin; melatonin    Diet: one serving of something green twice weekly (salads with raw spinach, asparagus -- avoids broccoli, etc)  Alcohol: none  Tobacco: none  OTC Pain Medication: APAP PRN    INR History:  Date 8/16 8/25 9/6 9/15 10/2 10/16 10/30 11/20 12/8   Total Weekly Dose 57.5 mg 57.5 mg 52.5 mg 37.5-45 mg 52.5 mg 52.5 mg 52.5 mg 45mg 55mg   INR 2.76 2.77 2.70 1.7 1.55 2.04 2.93 1.42 2.55   Notes   fewer GLV miss dose x 2?  Thrive Thrive miss dose x 1 ill; dex / Roceph IM inj.     Date 12/26 1/9/18 1/22 2/6 2/22 3/13 4/5 4/16 4/24   Total Weekly Dose 55mg 52.5 mg 52.5mg 52.5mg 52.5 mg 45mg 50mg 47.5 mg 45mg   INR 2.72 2.3 2.35 2.68 2.3 2.55 2.72 2.57 2.34   Notes flu; Tamiflu     1 miss  2 miss ill; dex / Roceph IM inj.     Date 4/27 5/2 5/18 6/8 7/9 8/2 8/16 8/30 10/1 10/8 10/12   Total Weekly Dose 45mg 45mg 45mg 45mg 45mg 45mg 45mg 45mg 45mg 42.5mg 40mg   INR 2.37 2.03 2.05 2.16 1.83 2.84 2.26 2.38 3.03 3.35 1.96   Notes  Zpak        x1 reduced dose  inc GLV     Date 10/19 10/26 11/9 11/30 12/14 12/21 1/4/19 1/25 2/18 3/18 4/1   Total Weekly Dose 45mg 50mg 47.5  mg 47.5  mg 47.5  mg 40mg 42.5mg 42.5mg 45mg 45mg 42.5mg   INR 1.56 2.18 1.8 3.13 3.00 2.13 1.92 1.36 2.52 2.74 3.01   Notes    fewer GLV 1x decr dose 2x decr dose    melatonin      Date 4/12 4/23 5/16 6/5 7/9 8/2 9/19 10/29 11/21 12/19    Total Weekly Dose 40mg 40mg 40mg 40mg 40mg 40mg 40mg 40mg 40mg 40mg    INR 1.91 2.26 1.49 1.59 1.98 1.91 2.05 1.93 1.94 1.99    Notes   Keflex 5/9, abx/steroid shot 5/16       7 days of amoxicillin      Phone Interview:  Tablet Strength: 2.5mg and 5mg tablets  Patient Contact Info: [phone]   Lab Contact Info: [facility]    Patient Findings   Negatives:  Signs/symptoms of thrombosis,  Signs/symptoms of bleeding, Laboratory test error suspected, Change in health, Change in alcohol use, Change in activity, Upcoming invasive procedure, Emergency department visit, Upcoming dental procedure, Missed doses, Extra doses, Change in medications, Change in diet/appetite, Hospital admission, Bruising, Other complaints   Comments:  Ms. Queen said she has not had any bruising or bleeding events since her last POCT. She has not missed any doses or had any change in medications. Her diet has stayed consistent, and she does not have any upcoming procedures.   On 11/25 since her last recheck she had an appointment with Dr. Klein. The visit was for nonrheumatic aortic valve stenosis. Their plan stated she was counseled on elevated triglycerides and advised her to limit starch rich foods and getting regular exercise. She states that she needs her 5 mg and 2.5 mg tablets called in to the pharmacy on file.      Plan:   1. INR is therapeutic today at 1.99. Instructed Mrs. Queen to continue warfarin 5mg daily except for 7.5mg on SunThurs.  2. Repeat INR on 1/16.  3. Verbal information provided over the phone. Deisi Queen RBV dosing instructions, expresses understanding by teach back, and has no further questions at this time.    Saul Douglass, Pharmacy Intern   12/19/2019  11:10 AM    Called in both refills of 2.5mg and 5mg.   I, Carmelita Morales, PharmD, have reviewed the note in full and agree with the assessment and plan.  12/19/19  3:21 PM

## 2020-01-08 ENCOUNTER — OFFICE VISIT (OUTPATIENT)
Dept: RETAIL CLINIC | Facility: CLINIC | Age: 35
End: 2020-01-08

## 2020-01-08 VITALS
BODY MASS INDEX: 33.29 KG/M2 | TEMPERATURE: 96.9 F | RESPIRATION RATE: 20 BRPM | HEART RATE: 70 BPM | OXYGEN SATURATION: 98 % | DIASTOLIC BLOOD PRESSURE: 80 MMHG | SYSTOLIC BLOOD PRESSURE: 120 MMHG | WEIGHT: 182 LBS

## 2020-01-08 DIAGNOSIS — J06.9 ACUTE URI: Primary | ICD-10-CM

## 2020-01-08 LAB
EXPIRATION DATE: NORMAL
INTERNAL CONTROL: NORMAL
Lab: NORMAL
S PYO AG THROAT QL: NEGATIVE

## 2020-01-08 PROCEDURE — 99213 OFFICE O/P EST LOW 20 MIN: CPT | Performed by: NURSE PRACTITIONER

## 2020-01-08 PROCEDURE — 87880 STREP A ASSAY W/OPTIC: CPT | Performed by: NURSE PRACTITIONER

## 2020-01-08 RX ORDER — PREDNISONE 20 MG/1
40 TABLET ORAL 2 TIMES DAILY
Qty: 6 TABLET | Refills: 0 | Status: SHIPPED | OUTPATIENT
Start: 2020-01-09 | End: 2020-01-12

## 2020-01-08 NOTE — PATIENT INSTRUCTIONS
Fainting: Uncertain Cause  Fainting (syncope) is a temporary loss of consciousness. It’s also called passing out. It occurs when blood flow to the brain is less than normal. Near-fainting (near-syncope) is very similar to fainting, but you don’t fully pass out.  Common minor causes of fainting include:  · Sudden fear  · Pain  · Nausea  · Emotional stress  · Overexertion  Suddenly standing up after sitting or lying for a long time can also cause fainting.  More serious causes of fainting include:  · Very slow or very fast heartbeat (arrhythmia)  · Other types of heart disease  · Dehydration  · Loss of blood loss  · Seizure  · Stroke  · Ruptured blood vessel in the brain  Taking too much high blood pressure medicine can also cause low blood pressure and fainting.  Your health care provider does not know the exact cause of your fainting. But the tests today did not show any of the serious causes of fainting. Sometimes you may need more tests to find out if you have a serious problem. That’s why it’s important to follow up with your provider as advised.  Home care  Follow these guidelines when caring for yourself at home:  · Rest today. You may go back to your normal activities when you are feeling back to normal. It is best to stay with someone who can check on you for the next 24 hours to watch for another episode of fainting.  · If you become lightheaded or dizzy, lie down right away. Or sit with your head between your knees.  · Because the provider doesn’t know the exact cause of your fainting or near-fainting spell, it’s possible for you to have another spell without warning. Because of this, don’t drive a car or operate dangerous equipment. Don’t take a bath alone. Use a shower instead. Don’t swim alone until your health care provider says that you are no longer in danger of having another fainting spell.  Follow-up care  Follow up with your health care provider, or as advised.  When to seek medical advice  Call  "Upper Respiratory Infection, Adult  An upper respiratory infection (URI) affects the nose, throat, and upper air passages. URIs are caused by germs (viruses). The most common type of URI is often called \"the common cold.\"  Medicines cannot cure URIs, but you can do things at home to relieve your symptoms. URIs usually get better within 7-10 days.  Follow these instructions at home:  Activity  · Rest as needed.  · If you have a fever, stay home from work or school until your fever is gone, or until your doctor says you may return to work or school.  ? You should stay home until you cannot spread the infection anymore (you are not contagious).  ? Your doctor may have you wear a face mask so you have less risk of spreading the infection.  Relieving symptoms  · Gargle with a salt-water mixture 3-4 times a day or as needed. To make a salt-water mixture, completely dissolve ½-1 tsp of salt in 1 cup of warm water.  · Use a cool-mist humidifier to add moisture to the air. This can help you breathe more easily.  Eating and drinking    · Drink enough fluid to keep your pee (urine) pale yellow.  · Eat soups and other clear broths.  General instructions    · Take over-the-counter and prescription medicines only as told by your doctor. These include cold medicines, fever reducers, and cough suppressants.  · Do not use any products that contain nicotine or tobacco. These include cigarettes and e-cigarettes. If you need help quitting, ask your doctor.  · Avoid being where people are smoking (avoid secondhand smoke).  · Make sure you get regular shots and get the flu shot every year.  · Keep all follow-up visits as told by your doctor. This is important.  How to avoid spreading infection to others    · Wash your hands often with soap and water. If you do not have soap and water, use hand .  · Avoid touching your mouth, face, eyes, or nose.  · Cough or sneeze into a tissue or your sleeve or elbow. Do not cough or sneeze " "into your hand or into the air.  Contact a doctor if:  · You are getting worse, not better.  · You have any of these:  ? A fever.  ? Chills.  ? Brown or red mucus in your nose.  ? Yellow or brown fluid (discharge)coming from your nose.  ? Pain in your face, especially when you bend forward.  ? Swollen neck glands.  ? Pain with swallowing.  ? White areas in the back of your throat.  Get help right away if:  · You have shortness of breath that gets worse.  · You have very bad or constant:  ? Headache.  ? Ear pain.  ? Pain in your forehead, behind your eyes, and over your cheekbones (sinus pain).  ? Chest pain.  · You have long-lasting (chronic) lung disease along with any of these:  ? Wheezing.  ? Long-lasting cough.  ? Coughing up blood.  ? A change in your usual mucus.  · You have a stiff neck.  · You have changes in your:  ? Vision.  ? Hearing.  ? Thinking.  ? Mood.  Summary  · An upper respiratory infection (URI) is caused by a germ called a virus. The most common type of URI is often called \"the common cold.\"  · URIs usually get better within 7-10 days.  · Take over-the-counter and prescription medicines only as told by your doctor.  This information is not intended to replace advice given to you by your health care provider. Make sure you discuss any questions you have with your health care provider.  Document Released: 06/05/2009 Document Revised: 08/10/2018 Document Reviewed: 08/10/2018  [employer] Patient Education © 2019 [employer]    " your health care provider right away if any of these occur:  · Another fainting spell that’s not explained by the common causes listed above  · Pain in your chest, arm, neck, jaw, back, or abdomen  · Shortness of breath  · Severe headache or seizure  · Blood in vomit or stools (black or red color)  · Unexpected vaginal bleeding  · Your heart beats very rapidly, very slowly, or irregularly (palpitations)  Also call your provider if you have signs of stroke:  · Weakness in an arm or leg or on one side of the face  · Difficulty speaking or seeing  · Extreme drowsiness, confusion, dizziness, or fainting  © 9158-6574 [employer]. [address] [place] [address], [place] [address]. All rights reserved. This information is not intended as a substitute for professional medical care. Always follow your healthcare professional's instructions.        Shoulder Pain with Uncertain Cause  Shoulder pain can have many causes. Pain often comes from the structures that surround the shoulder joint. These are the joint capsule, ligaments, tendons, muscles, and bursa. Pain can also come from cartilage in the joint. Cartilage can become worn out or injured. It’s important to know what’s causing your pain so the health care provider can use the correct treatment. But sometimes it’s difficult to find the exact cause of shoulder pain. You may need to see a specialist (orthopedist). You may also need special tests such as a CT scan or MRI. The provider may need to use special tools to look inside the joint (arthroscopy).  Shoulder pain can be treated with a sling or a device that keeps your shoulder from moving. You can take an anti-inflammatory medicine such as ibuprofen to ease pain. You may need to do special shoulder exercises. Follow-up with a specialist if the pain is severe or doesn’t go away after a few weeks.  Home care  Follow these tips when caring for yourself at home:  · If a sling was given to you, leave it in place for  the time advised by your health care provider. If you aren’t sure how long to wear it, ask for advice. If the sling becomes loose, adjust it so that your forearm is level with the ground. Your shoulder should feel well supported.  · Put an ice pack on the injured area for 20 minutes every 1 to 2 hours the first day. You can make your own ice pack by putting ice cubes in a plastic bag. Wrap the bag in a thin towel. Continue with ice packs 3 to 4 times a day for the next 2 days. Then use the pack as needed to ease pain and swelling.  · You may use acetaminophen or ibuprofen to control pain, unless another pain medicine was prescribed. If you have chronic liver or kidney disease, talk with your health care provider before using these medicines. Also talk with your provider if you’ve had a stomach ulcer or GI bleeding.  · Shoulder pain may seem worse at night, when there is less to distract you from the pain. If you sleep on your side, try to keep weight off your painful shoulder. Propping pillows behind you may stop you from rolling over onto that shoulder during sleep.   · Shoulder joints can become stiff if left in a sling for too long. You should start range of motion exercises about 10 days after the injury. Talk with your provider to find out what type of exercises to do and how soon to start.  · You can take the sling off to shower or bathe.  Follow-up care  Follow up with your health care provider if you don’t start to get better in the next 5 days.  When to seek medical advice  Call your health care provider right away if any of these occur:  · Pain or swelling gets worse or continues for more than a few days  · Your hand or fingers become cold, blue, numb, or tingly  · Large amount of bruising on your shoulder or upper arm  · Difficulty moving your hand or fingers  · Weakness in your hand or fingers  · Your shoulder becomes stiff  · It feels like your shoulder is popping out  · You are less able to do your  daily activities     © 8335-7567 [employer]. [address] [place] [address], [place] [address]. All rights reserved. This information is not intended as a substitute for professional medical care. Always follow your healthcare professional's instructions.

## 2020-01-20 ENCOUNTER — TELEPHONE (OUTPATIENT)
Dept: PHARMACY | Facility: HOSPITAL | Age: 35
End: 2020-01-20

## 2020-01-24 ENCOUNTER — LAB (OUTPATIENT)
Dept: LAB | Facility: HOSPITAL | Age: 35
End: 2020-01-24

## 2020-01-24 ENCOUNTER — ANTICOAGULATION VISIT (OUTPATIENT)
Dept: PHARMACY | Facility: HOSPITAL | Age: 35
End: 2020-01-24

## 2020-01-24 DIAGNOSIS — Z95.2 HX OF AORTIC VALVE REPLACEMENT, MECHANICAL: ICD-10-CM

## 2020-01-24 LAB
INR PPP: 1.96 (ref 0.9–1.1)
PROTHROMBIN TIME: 23.3 SECONDS (ref 11–15.4)

## 2020-01-24 PROCEDURE — 85610 PROTHROMBIN TIME: CPT

## 2020-01-24 PROCEDURE — 36415 COLL VENOUS BLD VENIPUNCTURE: CPT

## 2020-01-24 NOTE — PROGRESS NOTES
Anticoagulation Clinic - Remote Progress Note  REMOTE LAB    Indication: mechanical AVR (On-X)  Referring Provider: Latoya (Last seen: 11/19/18  next appt: 11/25/19)  Goal INR: 1.5-2.5  Current Drug Interactions: aspirin; melatonin    Diet: one serving of something green twice weekly (salads with raw spinach, asparagus -- avoids broccoli, etc)  Alcohol: none  Tobacco: none  OTC Pain Medication: APAP PRN    INR History:  Date 8/16 8/25 9/6 9/15 10/2 10/16 10/30 11/20 12/8   Total Weekly Dose 57.5 mg 57.5 mg 52.5 mg 37.5-45 mg 52.5 mg 52.5 mg 52.5 mg 45mg 55mg   INR 2.76 2.77 2.70 1.7 1.55 2.04 2.93 1.42 2.55   Notes   fewer GLV miss dose x 2?  Thrive Thrive miss dose x 1 ill; dex / Roceph IM inj.     Date 12/26 1/9/18 1/22 2/6 2/22 3/13 4/5 4/16 4/24   Total Weekly Dose 55mg 52.5 mg 52.5mg 52.5mg 52.5 mg 45mg 50mg 47.5 mg 45mg   INR 2.72 2.3 2.35 2.68 2.3 2.55 2.72 2.57 2.34   Notes flu; Tamiflu     1 miss  2 miss ill; dex / Roceph IM inj.     Date 4/27 5/2 5/18 6/8 7/9 8/2 8/16 8/30 10/1 10/8 10/12   Total Weekly Dose 45mg 45mg 45mg 45mg 45mg 45mg 45mg 45mg 45mg 42.5mg 40mg   INR 2.37 2.03 2.05 2.16 1.83 2.84 2.26 2.38 3.03 3.35 1.96   Notes  Zpak        x1 reduced dose  inc GLV     Date 10/19 10/26 11/9 11/30 12/14 12/21 1/4/19 1/25 2/18 3/18 4/1   Total Weekly Dose 45mg 50mg 47.5  mg 47.5  mg 47.5  mg 40mg 42.5mg 42.5mg 45mg 45mg 42.5mg   INR 1.56 2.18 1.8 3.13 3.00 2.13 1.92 1.36 2.52 2.74 3.01   Notes    fewer GLV 1x decr dose 2x decr dose    melatonin      Date 4/12 4/23 5/16 6/5 7/9 8/2 9/19 10/29 11/21 12/19 1/24/20   Total Weekly Dose 40mg 40mg 40mg 40mg 40mg 40mg 40mg 40mg 40mg 40mg 40mg   INR 1.91 2.26 1.49 1.59 1.98 1.91 2.05 1.93 1.94 1.99 1.96   Notes   Keflex 5/9, abx/steroid shot 5/16       7 days of amoxil      Date       Total Weekly Dose       INR       Notes         Phone Interview:  Tablet Strength: 2.5mg and 5mg tablets  Patient Contact Info: [phone]   Lab Contact Info: [facility]  [facility]    Patient Findings   Negatives:  Signs/symptoms of thrombosis, Signs/symptoms of bleeding, Laboratory test error suspected, Change in health, Change in alcohol use, Change in activity, Upcoming invasive procedure, Emergency department visit, Upcoming dental procedure, Missed doses, Extra doses, Change in medications, Change in diet/appetite, Hospital admission, Bruising, Other complaints   Comments:  Patient denies all findings.     Plan:   1. INR is therapeutic today at 1.96. Instructed Mrs. Queen to continue warfarin 5mg daily except for 7.5mg on SunThurs.  2. Repeat INR in 4 weeks, 2/21  3. Verbal information provided over the phone. Deisi Queen RBV dosing instructions, expresses understanding by teach back, and has no further questions at this time.    Genaro Rodriguez, [place]  1/24/2020  1:08 PM       Pt did have course of prednisone in early January without calling clinic. At next encounter may want to remind her to notify clinic of medication changes.  IPaulette [facility], have reviewed the note in full and agree with the assessment and plan.  01/24/20  2:22 PM

## 2020-02-20 ENCOUNTER — LAB (OUTPATIENT)
Dept: LAB | Facility: HOSPITAL | Age: 35
End: 2020-02-20

## 2020-02-20 ENCOUNTER — ANTICOAGULATION VISIT (OUTPATIENT)
Dept: PHARMACY | Facility: HOSPITAL | Age: 35
End: 2020-02-20

## 2020-02-20 DIAGNOSIS — Z95.2 HX OF AORTIC VALVE REPLACEMENT, MECHANICAL: ICD-10-CM

## 2020-02-20 LAB
INR PPP: 2.16 (ref 0.9–1.1)
PROTHROMBIN TIME: 25.1 SECONDS (ref 11–15.4)

## 2020-02-20 PROCEDURE — 36415 COLL VENOUS BLD VENIPUNCTURE: CPT

## 2020-02-20 PROCEDURE — 85610 PROTHROMBIN TIME: CPT

## 2020-02-20 RX ORDER — WARFARIN SODIUM 2.5 MG/1
TABLET ORAL
Qty: 30 TABLET | Refills: 0 | Status: SHIPPED | OUTPATIENT
Start: 2020-02-20 | End: 2020-07-28 | Stop reason: SDUPTHER

## 2020-02-20 NOTE — PROGRESS NOTES
Anticoagulation Clinic - Remote Progress Note  REMOTE LAB    Indication: mechanical AVR (On-X)  Referring Provider: Latoya (Last seen: 11/19/18  next appt: 11/25/19)  Goal INR: 1.5-2.5  Current Drug Interactions: aspirin; melatonin    Diet: one serving of something green twice weekly (salads with raw spinach, asparagus -- avoids broccoli, etc)  Alcohol: none  Tobacco: none  OTC Pain Medication: APAP PRN    INR History:  Date 8/16 8/25 9/6 9/15 10/2 10/16 10/30 11/20 12/8 12/26 1/9/18 1/22   Total WeeklyDose 57.5mg 57.5mg 52.5mg 37.5mg  - 45mg 52.5mg 52.5mg 52.5mg 45mg 55mg 55mg 52.5mg 52.5mg   INR 2.76 2.77 2.70 1.7 1.55 2.04 2.93 1.42 2.55 2.72 2.3 2.35   Notes   fewer GLV miss 2x?  Thrive Thrive 1x miss ill; dex / roceph IM inj. flu; tamiflu       Date 2/6 2/22 3/13 4/5 4/16 4/24 4/27 5/2 5/18 6/8 7/9 8/2   Total WeeklyDose 52.5mg 52.5mg 45mg 50mg 47.5mg 45mg 45mg 45mg 45mg 45mg 45mg 45mg   INR 2.68 2.3 2.55 2.72 2.57 2.34 2.37 2.03 2.05 2.16 1.83 2.84   Notes   1x miss  2x miss ill; dex / roceph IM inj.  Zpak         Date 8/16 8/30 10/1 10/8 10/12 10/19 10/26 11/9 11/30 12/14 12/21 1/4/19   Total WeeklyDose 45mg 45mg 45mg 42.5mg 40mg 45mg 50mg 47.5mg 47.5mg 47.5mg 40mg 42.5mg   INR 2.26 2.38 3.03 3.35 1.96 1.56 2.18 1.8 3.13 3.0 2.13 1.92   Notes    1x decr dose incr GLV    fewer GLV 1x decr dose 2x decr dose      Date 1/25 2/18 3/18 4/1 4/12 4/23 5/16 6/5 7/9 8/2 9/19 10/29   Total WeeklyDose 42.5mg 45mg 45mg 42.5mg 40mg 40mg 40mg 40mg 40mg 40mg 40mg 40mg   INR 1.36 2.52 2.74 3.01 1.91 2.26 1.49 1.59 1.98 1.91 2.05 1.93   Notes   melatonin    keflex 5/9, abx / steroid inj 5/16          Date 11/21 12/19 1/24/20 2/20           Total WeeklyDose 40mg 40mg 40mg 40mg           INR 1.94 1.99 1.96 2.16           Notes  amox 7x days               Phone Interview:  Tablet Strength: 2.5mg and 5mg tablets  Patient Contact Info: [phone]   Lab Contact Info: [facility]    Patient Findings   Negatives:  Signs/symptoms  of thrombosis, Signs/symptoms of bleeding, Laboratory test error suspected, Change in health, Change in alcohol use, Change in activity, Upcoming invasive procedure, Emergency department visit, Upcoming dental procedure, Missed doses, Extra doses, Change in medications, Change in diet/appetite, Hospital admission, Bruising, Other complaints     Plan:   1. INR is therapeutic today at 2.16. Instructed Mrs. Queen to continue warfarin 5mg oral daily except for 7.5mg on SunThurs.  2. Repeat INR in 4 weeks, 3/19  3. Verbal information provided over the phone. Deisi Queen RBV dosing instructions, expresses understanding by teach back, and has no further questions at this time.  4. Patient requests refill on her warfarin 2.5mg tablet be called into [facility] in [place], [place].      Genaro Rodriguez, [place]  2/20/2020  10:48     I, Sarah Zavala, PharmD, have reviewed the note in full and agree with the assessment and plan.  Rx for warfarin 2.5 mg tablets, #30, sent as requested.  02/20/20  12:14 PM

## 2020-03-13 ENCOUNTER — ANTICOAGULATION VISIT (OUTPATIENT)
Dept: PHARMACY | Facility: HOSPITAL | Age: 35
End: 2020-03-13

## 2020-03-13 ENCOUNTER — LAB (OUTPATIENT)
Dept: LAB | Facility: HOSPITAL | Age: 35
End: 2020-03-13

## 2020-03-13 DIAGNOSIS — Z95.2 HX OF AORTIC VALVE REPLACEMENT, MECHANICAL: ICD-10-CM

## 2020-03-13 LAB
INR PPP: 1.86 (ref 0.9–1.1)
PROTHROMBIN TIME: 22.3 SECONDS (ref 11–15.4)

## 2020-03-13 PROCEDURE — 85610 PROTHROMBIN TIME: CPT

## 2020-03-13 PROCEDURE — 36415 COLL VENOUS BLD VENIPUNCTURE: CPT

## 2020-03-13 NOTE — PROGRESS NOTES
Anticoagulation Clinic - Remote Progress Note  REMOTE LAB    Indication: mechanical AVR (On-X)  Referring Provider: Latoya (Last seen: 11/19/18  next appt: 11/25/19)  Goal INR: 1.5-2.5  Current Drug Interactions: aspirin; melatonin    Diet: one serving of something green twice weekly (salads with raw spinach, asparagus -- avoids broccoli, etc)  Alcohol: none  Tobacco: none  OTC Pain Medication: APAP PRN    INR History:  Date 8/16 8/25 9/6 9/15 10/2 10/16 10/30 11/20 12/8 12/26 1/9/18 1/22   Total WeeklyDose 57.5mg 57.5mg 52.5mg 37.5mg  - 45mg 52.5mg 52.5mg 52.5mg 45mg 55mg 55mg 52.5mg 52.5mg   INR 2.76 2.77 2.70 1.7 1.55 2.04 2.93 1.42 2.55 2.72 2.3 2.35   Notes   fewer GLV miss 2x?  Thrive Thrive 1x miss ill; dex / roceph IM inj. flu; tamiflu       Date 2/6 2/22 3/13 4/5 4/16 4/24 4/27 5/2 5/18 6/8 7/9 8/2   Total WeeklyDose 52.5mg 52.5mg 45mg 50mg 47.5mg 45mg 45mg 45mg 45mg 45mg 45mg 45mg   INR 2.68 2.3 2.55 2.72 2.57 2.34 2.37 2.03 2.05 2.16 1.83 2.84   Notes   1x miss  2x miss ill; dex / roceph IM inj.  Zpak         Date 8/16 8/30 10/1 10/8 10/12 10/19 10/26 11/9 11/30 12/14 12/21 1/4/19   Total WeeklyDose 45mg 45mg 45mg 42.5mg 40mg 45mg 50mg 47.5mg 47.5mg 47.5mg 40mg 42.5mg   INR 2.26 2.38 3.03 3.35 1.96 1.56 2.18 1.8 3.13 3.0 2.13 1.92   Notes    1x decr dose incr GLV    fewer GLV 1x decr dose 2x decr dose      Date 1/25 2/18 3/18 4/1 4/12 4/23 5/16 6/5 7/9 8/2 9/19 10/29   Total WeeklyDose 42.5mg 45mg 45mg 42.5mg 40mg 40mg 40mg 40mg 40mg 40mg 40mg 40mg   INR 1.36 2.52 2.74 3.01 1.91 2.26 1.49 1.59 1.98 1.91 2.05 1.93   Notes   melatonin    keflex 5/9, abx / steroid inj 5/16          Date 11/21 12/19 1/24/20 2/20 3/13          Total WeeklyDose 40mg 40mg 40mg 40mg 40mg          INR 1.94 1.99 1.96 2.16 1.86          Notes  amox 7x days               Phone Interview:  Tablet Strength: 2.5mg and 5mg tablets  Patient Contact Info: [phone]   Lab Contact Info: [facility]    Findings   Negatives:   Signs/symptoms of thrombosis, Signs/symptoms of bleeding, Laboratory test error suspected, Change in health, Change in alcohol use, Change in activity, Upcoming invasive procedure, Emergency department visit, Upcoming dental procedure, Missed doses, Extra doses, Change in medications, Change in diet/appetite, Hospital admission, Bruising, Other complaints     Plan:   1. INR is therapeutic today at 1.86. Instructed Mrs. Queen to continue warfarin 5mg oral daily except for 7.5mg on SunThurs  2. Repeat INR in 4 weeks  3. Verbal information provided over the phone. Deisi Queen RBV dosing instructions, expresses understanding by teach back, and has no further questions at this time.    Genaro Rodriguez CPhT  3/13/2020  14:51     I, Paulette Gonzalez [facility], have reviewed the note in full and agree with the assessment and plan.  03/13/20  15:53

## 2020-04-20 ENCOUNTER — TELEPHONE (OUTPATIENT)
Dept: PHARMACY | Facility: HOSPITAL | Age: 35
End: 2020-04-20

## 2020-04-20 NOTE — TELEPHONE ENCOUNTER
Spoke with patient re: overdue INR    She says she will go as soon as she can find a  for her children since they are not in school right now

## 2020-04-30 ENCOUNTER — ANTICOAGULATION VISIT (OUTPATIENT)
Dept: PHARMACY | Facility: HOSPITAL | Age: 35
End: 2020-04-30

## 2020-04-30 ENCOUNTER — LAB (OUTPATIENT)
Dept: LAB | Facility: HOSPITAL | Age: 35
End: 2020-04-30

## 2020-04-30 DIAGNOSIS — Z95.2 HX OF AORTIC VALVE REPLACEMENT, MECHANICAL: ICD-10-CM

## 2020-04-30 LAB
INR PPP: 2.62 (ref 0.9–1.1)
PROTHROMBIN TIME: 29.2 SECONDS (ref 11–15.4)

## 2020-04-30 PROCEDURE — 85610 PROTHROMBIN TIME: CPT

## 2020-04-30 PROCEDURE — 36415 COLL VENOUS BLD VENIPUNCTURE: CPT

## 2020-04-30 NOTE — PROGRESS NOTES
Anticoagulation Clinic - Remote Progress Note  REMOTE LAB    Indication: mechanical AVR (On-X)  Referring Provider: Latoya (Last seen: 11/19/18  next appt: 11/25/19)  Goal INR: 1.5-2.5  Current Drug Interactions: aspirin; melatonin    Diet: one serving of something green twice weekly (salads with raw spinach, asparagus -- avoids broccoli, etc) 4/30/2020  Alcohol: none  Tobacco: none  OTC Pain Medication: APAP PRN    INR History:  Date 8/16 8/25 9/6 9/15 10/2 10/16 10/30 11/20 12/8 12/26 1/9/18 1/22   Total WeeklyDose 57.5mg 57.5mg 52.5mg 37.5mg  - 45mg 52.5mg 52.5mg 52.5mg 45mg 55mg 55mg 52.5mg 52.5mg   INR 2.76 2.77 2.70 1.7 1.55 2.04 2.93 1.42 2.55 2.72 2.3 2.35   Notes   fewer GLV miss 2x?  Thrive Thrive 1x miss ill; dex / roceph IM inj. flu; tamiflu       Date 2/6 2/22 3/13 4/5 4/16 4/24 4/27 5/2 5/18 6/8 7/9 8/2   Total WeeklyDose 52.5mg 52.5mg 45mg 50mg 47.5mg 45mg 45mg 45mg 45mg 45mg 45mg 45mg   INR 2.68 2.3 2.55 2.72 2.57 2.34 2.37 2.03 2.05 2.16 1.83 2.84   Notes   1x miss  2x miss ill; dex / roceph IM inj.  Zpak         Date 8/16 8/30 10/1 10/8 10/12 10/19 10/26 11/9 11/30 12/14 12/21 1/4/19   Total WeeklyDose 45mg 45mg 45mg 42.5mg 40mg 45mg 50mg 47.5mg 47.5mg 47.5mg 40mg 42.5mg   INR 2.26 2.38 3.03 3.35 1.96 1.56 2.18 1.8 3.13 3.0 2.13 1.92   Notes    1x decr dose incr GLV    fewer GLV 1x decr dose 2x decr dose      Date 1/25 2/18 3/18 4/1 4/12 4/23 5/16 6/5 7/9 8/2 9/19 10/29   Total WeeklyDose 42.5mg 45mg 45mg 42.5mg 40mg 40mg 40mg 40mg 40mg 40mg 40mg 40mg   INR 1.36 2.52 2.74 3.01 1.91 2.26 1.49 1.59 1.98 1.91 2.05 1.93   Notes   melatonin    keflex 5/9, abx / steroid inj 5/16          Date 11/21 12/19 1/24/20 2/20 3/13 4/30         Total WeeklyDose 40mg 40mg 40mg 40mg 40mg 40mg         INR 1.94 1.99 1.96 2.16 1.86 2.62         Notes  amox 7x days               Phone Interview:  Tablet Strength: 2.5mg and 5mg tablets  Patient Contact Info: [phone]   Lab Contact Info: [facility]    Patient  Findings   Negatives:  Signs/symptoms of thrombosis, Signs/symptoms of bleeding, Laboratory test error suspected, Change in health, Change in alcohol use, Change in activity, Upcoming invasive procedure, Emergency department visit, Upcoming dental procedure, Missed doses, Extra doses, Change in medications, Change in diet/appetite, Hospital admission, Bruising, Other complaints     Plan:   1. INR is slightly SUPRAtherapeutic today. She reports no changes to her patient findings. Instructed Mrs. Queen to decrease dose tonight to 5mg and then continue warfarin 5mg oral daily except for 7.5mg on SunThurs  2. Repeat INR 5/18/2020.   3. Verbal information provided over the phone. Deisi Queen RBV dosing instructions, expresses understanding by teach back, and has no further questions at this time.    Carmelita Morales, PharmD  4/30/2020  12:55

## 2020-05-05 DIAGNOSIS — Q23.1 BICUSPID AORTIC VALVE: Primary | ICD-10-CM

## 2020-05-05 DIAGNOSIS — R00.2 PALPITATIONS: ICD-10-CM

## 2020-05-27 ENCOUNTER — TRANSCRIBE ORDERS (OUTPATIENT)
Dept: ADMINISTRATIVE | Facility: HOSPITAL | Age: 35
End: 2020-05-27

## 2020-05-27 ENCOUNTER — LAB (OUTPATIENT)
Dept: LAB | Facility: HOSPITAL | Age: 35
End: 2020-05-27

## 2020-05-27 DIAGNOSIS — Z95.2: Primary | ICD-10-CM

## 2020-05-27 DIAGNOSIS — Z95.2: ICD-10-CM

## 2020-05-27 LAB
INR PPP: 2.68 (ref 0.9–1.1)
PROTHROMBIN TIME: 28.4 SECONDS (ref 11.9–14.1)

## 2020-05-27 PROCEDURE — 36415 COLL VENOUS BLD VENIPUNCTURE: CPT

## 2020-05-27 PROCEDURE — 85610 PROTHROMBIN TIME: CPT

## 2020-05-28 ENCOUNTER — ANTICOAGULATION VISIT (OUTPATIENT)
Dept: PHARMACY | Facility: HOSPITAL | Age: 35
End: 2020-05-28

## 2020-05-28 DIAGNOSIS — Z95.2 HX OF AORTIC VALVE REPLACEMENT, MECHANICAL: ICD-10-CM

## 2020-05-28 NOTE — PROGRESS NOTES
Anticoagulation Clinic - Remote Progress Note  REMOTE LAB    Indication: mechanical AVR (On-X)  Referring Provider: Latoya (Last seen: 11/19/18  next appt: 11/25/19)  Goal INR: 1.5-2.5  Current Drug Interactions: aspirin; melatonin    Diet: one serving of something green twice weekly (salads with raw spinach, asparagus -- avoids broccoli, etc) 4/30/2020  Alcohol: none  Tobacco: none  OTC Pain Medication: APAP PRN    INR History:  Date 8/16 8/25 9/6 9/15 10/2 10/16 10/30 11/20 12/8 12/26 1/9/18 1/22   Total WeeklyDose 57.5mg 57.5mg 52.5mg 37.5mg  - 45mg 52.5mg 52.5mg 52.5mg 45mg 55mg 55mg 52.5mg 52.5mg   INR 2.76 2.77 2.70 1.7 1.55 2.04 2.93 1.42 2.55 2.72 2.3 2.35   Notes   fewer GLV miss 2x?  Thrive Thrive 1x miss ill; dex / roceph IM inj. flu; tamiflu       Date 2/6 2/22 3/13 4/5 4/16 4/24 4/27 5/2 5/18 6/8 7/9 8/2   Total WeeklyDose 52.5mg 52.5mg 45mg 50mg 47.5mg 45mg 45mg 45mg 45mg 45mg 45mg 45mg   INR 2.68 2.3 2.55 2.72 2.57 2.34 2.37 2.03 2.05 2.16 1.83 2.84   Notes   1x miss  2x miss ill; dex / roceph IM inj.  Zpak         Date 8/16 8/30 10/1 10/8 10/12 10/19 10/26 11/9 11/30 12/14 12/21 1/4/19   Total WeeklyDose 45mg 45mg 45mg 42.5mg 40mg 45mg 50mg 47.5mg 47.5mg 47.5mg 40mg 42.5mg   INR 2.26 2.38 3.03 3.35 1.96 1.56 2.18 1.8 3.13 3.0 2.13 1.92   Notes    1x decr dose incr GLV    fewer GLV 1x decr dose 2x decr dose      Date 1/25 2/18 3/18 4/1 4/12 4/23 5/16 6/5 7/9 8/2 9/19 10/29   Total WeeklyDose 42.5mg 45mg 45mg 42.5mg 40mg 40mg 40mg 40mg 40mg 40mg 40mg 40mg   INR 1.36 2.52 2.74 3.01 1.91 2.26 1.49 1.59 1.98 1.91 2.05 1.93   Notes   melatonin    keflex 5/9, abx / steroid inj 5/16          Date 11/21 12/19 1/24/20 2/20 3/13 4/30 5/28        Total WeeklyDose 40mg 40mg 40mg 40mg 40mg 40mg 40mg        INR 1.94 1.99 1.96 2.16 1.86 2.62 2.68        Notes  amox 7x days     decr GLV?          Phone Interview:  Tablet Strength: 2.5mg and 5mg tablets  Patient Contact Info: [phone]   Lab Contact Info:   [facility]    Patient Findings   Negatives:  Signs/symptoms of thrombosis, Signs/symptoms of bleeding, Laboratory test error suspected, Change in health, Change in alcohol use, Change in activity, Upcoming invasive procedure, Emergency department visit, Upcoming dental procedure, Missed doses, Extra doses, Change in medications, Change in diet/appetite, Hospital admission, Bruising, Other complaints   Comments:  Patient denies all findings, does admit she may have eaten less GLV in the past week or more? She would like to increase GLV intake instead of reducing dose for tonight.     Plan:   1. INR is again SUPRAtherapeutic. She reports no changes to her patient findings. Instructed Mrs. Queen to eat a good serving of GLV and resume usual GLV intake and then continue warfarin 5mg oral daily except for 7.5mg on SunThurs. Patient respectfully requests to increase GLV intake instead of reducing warfarin dose.  2. Repeat INR in 2 weeks.  3. Verbal information provided over the phone. Deisi Queen RBV dosing instructions, expresses understanding by teach back, and has no further questions at this time.  4. Patient requests refills on her warfarin 2.5 and 5mg tablets be called into [email or web address] in [place], [place].    Genaro Rodriguez CPhT  05/28/20  2:15 PM      Paulette BROUSSARD [facility], have reviewed the note in full and agree with the assessment and plan.  05/28/20  16:01

## 2020-06-19 ENCOUNTER — TRANSCRIBE ORDERS (OUTPATIENT)
Dept: ADMINISTRATIVE | Facility: HOSPITAL | Age: 35
End: 2020-06-19

## 2020-06-19 ENCOUNTER — LAB (OUTPATIENT)
Dept: LAB | Facility: HOSPITAL | Age: 35
End: 2020-06-19

## 2020-06-19 ENCOUNTER — ANTICOAGULATION VISIT (OUTPATIENT)
Dept: PHARMACY | Facility: HOSPITAL | Age: 35
End: 2020-06-19

## 2020-06-19 DIAGNOSIS — Z95.2 S/P AORTIC VALVE REPLACEMENT WITH PROSTHETIC VALVE: ICD-10-CM

## 2020-06-19 DIAGNOSIS — Z95.2 HX OF AORTIC VALVE REPLACEMENT, MECHANICAL: ICD-10-CM

## 2020-06-19 DIAGNOSIS — Z95.2 S/P AORTIC VALVE REPLACEMENT WITH PROSTHETIC VALVE: Primary | ICD-10-CM

## 2020-06-19 LAB
INR PPP: 1.21 (ref 0.9–1.1)
PROTHROMBIN TIME: 15.1 SECONDS (ref 11.9–14.1)

## 2020-06-19 PROCEDURE — 85610 PROTHROMBIN TIME: CPT

## 2020-06-19 PROCEDURE — 36415 COLL VENOUS BLD VENIPUNCTURE: CPT

## 2020-06-19 NOTE — PROGRESS NOTES
Anticoagulation Clinic - Remote Progress Note  REMOTE LAB    Indication: mechanical AVR (On-X)  Referring Provider: Latoya (Last seen: 11/19/18  next appt: 11/25/19)  Goal INR: 1.5-2.5  Current Drug Interactions: aspirin; melatonin    Diet: brussel sprouts 1x/week 6/19/2020  Alcohol: none  Tobacco: none  OTC Pain Medication: APAP PRN    INR History:  Date 8/16 8/25 9/6 9/15 10/2 10/16 10/30 11/20 12/8 12/26 1/9/18 1/22   Total WeeklyDose 57.5mg 57.5mg 52.5mg 37.5mg  - 45mg 52.5mg 52.5mg 52.5mg 45mg 55mg 55mg 52.5mg 52.5mg   INR 2.76 2.77 2.70 1.7 1.55 2.04 2.93 1.42 2.55 2.72 2.3 2.35   Notes   fewer GLV miss 2x?  Thrive Thrive 1x miss ill; dex / roceph IM inj. flu; tamiflu       Date 2/6 2/22 3/13 4/5 4/16 4/24 4/27 5/2 5/18 6/8 7/9 8/2   Total WeeklyDose 52.5mg 52.5mg 45mg 50mg 47.5mg 45mg 45mg 45mg 45mg 45mg 45mg 45mg   INR 2.68 2.3 2.55 2.72 2.57 2.34 2.37 2.03 2.05 2.16 1.83 2.84   Notes   1x miss  2x miss ill; dex / roceph IM inj.  Zpak         Date 8/16 8/30 10/1 10/8 10/12 10/19 10/26 11/9 11/30 12/14 12/21 1/4/19   Total WeeklyDose 45mg 45mg 45mg 42.5mg 40mg 45mg 50mg 47.5mg 47.5mg 47.5mg 40mg 42.5mg   INR 2.26 2.38 3.03 3.35 1.96 1.56 2.18 1.8 3.13 3.0 2.13 1.92   Notes    1x decr dose incr GLV    fewer GLV 1x decr dose 2x decr dose      Date 1/25 2/18 3/18 4/1 4/12 4/23 5/16 6/5 7/9 8/2 9/19 10/29   Total WeeklyDose 42.5mg 45mg 45mg 42.5mg 40mg 40mg 40mg 40mg 40mg 40mg 40mg 40mg   INR 1.36 2.52 2.74 3.01 1.91 2.26 1.49 1.59 1.98 1.91 2.05 1.93   Notes   melatonin    keflex 5/9, abx / steroid inj 5/16          Date 11/21 12/19 1/24/20 2/20 3/13 4/30 5/28 6/19       Total WeeklyDose 40mg 40mg 40mg 40mg 40mg 40mg 40mg 40mg       INR 1.94 1.99 1.96 2.16 1.86 2.62 2.68 1.21       Notes  amox 7x days     decr GLV?          Phone Interview:  Tablet Strength: 2.5mg and 5mg tablets  Patient Contact Info: [phone]   Lab Contact Info: [facility]    Patient Findings   Negatives:  Signs/symptoms of thrombosis,  Signs/symptoms of bleeding, Laboratory test error suspected, Change in health, Change in alcohol use, Change in activity, Upcoming invasive procedure, Emergency department visit, Upcoming dental procedure, Missed doses, Extra doses, Change in medications, Change in diet/appetite, Hospital admission, Bruising, Other complaints   Comments:  Patient has increased her GLV intake (brussel sprouts) this past week. She reports that she was eating brussel sprouts once weekly until COVID where she stopped eating them.      Plan:   1. INR is SUBtherapeutic today. Instructed Ms. Queen to increase her dose of warfarin to 7.5mg today and then resume prior maintenance dose of warfarin 5mg oral daily except for 7.5mg on SunThurs.  2. Repeat INR in 2 weeks on 7/2 to ensure WNL.  3. Verbal information provided over the phone. Deisi Queen RBV dosing instructions, expresses understanding by teach back, and has no further questions at this time.    Carmelita Morales, PharmD  06/19/20  12:51

## 2020-07-06 ENCOUNTER — TELEPHONE (OUTPATIENT)
Dept: PHARMACY | Facility: HOSPITAL | Age: 35
End: 2020-07-06

## 2020-07-16 ENCOUNTER — TRANSCRIBE ORDERS (OUTPATIENT)
Dept: ADMINISTRATIVE | Facility: HOSPITAL | Age: 35
End: 2020-07-16

## 2020-07-16 ENCOUNTER — ANTICOAGULATION VISIT (OUTPATIENT)
Dept: PHARMACY | Facility: HOSPITAL | Age: 35
End: 2020-07-16

## 2020-07-16 ENCOUNTER — LAB (OUTPATIENT)
Dept: LAB | Facility: HOSPITAL | Age: 35
End: 2020-07-16

## 2020-07-16 DIAGNOSIS — Z95.2 HX OF AORTIC VALVE REPLACEMENT, MECHANICAL: ICD-10-CM

## 2020-07-16 DIAGNOSIS — Z95.2 HEART VALVE REPLACED BY TRANSPLANT: Primary | ICD-10-CM

## 2020-07-16 DIAGNOSIS — Z95.2 HEART VALVE REPLACED BY TRANSPLANT: ICD-10-CM

## 2020-07-16 DIAGNOSIS — Z95.2 HX OF AORTIC VALVE REPLACEMENT, MECHANICAL: Primary | ICD-10-CM

## 2020-07-16 LAB
INR PPP: 1.78 (ref 0.9–1.1)
PROTHROMBIN TIME: 20.6 SECONDS (ref 11.9–14.1)

## 2020-07-16 PROCEDURE — 36415 COLL VENOUS BLD VENIPUNCTURE: CPT

## 2020-07-16 PROCEDURE — 85610 PROTHROMBIN TIME: CPT

## 2020-07-16 NOTE — PROGRESS NOTES
Anticoagulation Clinic - Remote Progress Note  REMOTE LAB    Indication: mechanical AVR (On-X)  Referring Provider: Latoya (Last seen: 11/19/18  next appt: 11/25/19)  Goal INR: 1.5-2.5  Current Drug Interactions: aspirin; melatonin    Diet: brussel sprouts 1x/week 6/19/2020  Alcohol: none  Tobacco: none  OTC Pain Medication: APAP PRN    INR History:  Date 8/16 8/25 9/6 9/15 10/2 10/16 10/30 11/20 12/8 12/26 1/9/18 1/22   Total WeeklyDose 57.5mg 57.5mg 52.5mg 37.5mg  - 45mg 52.5mg 52.5mg 52.5mg 45mg 55mg 55mg 52.5mg 52.5mg   INR 2.76 2.77 2.70 1.7 1.55 2.04 2.93 1.42 2.55 2.72 2.3 2.35   Notes   fewer GLV miss 2x?  Thrive Thrive 1x miss ill; dex / roceph IM inj. flu; tamiflu       Date 2/6 2/22 3/13 4/5 4/16 4/24 4/27 5/2 5/18 6/8 7/9 8/2   Total WeeklyDose 52.5mg 52.5mg 45mg 50mg 47.5mg 45mg 45mg 45mg 45mg 45mg 45mg 45mg   INR 2.68 2.3 2.55 2.72 2.57 2.34 2.37 2.03 2.05 2.16 1.83 2.84   Notes   1x miss  2x miss ill; dex / roceph IM inj.  Zpak         Date 8/16 8/30 10/1 10/8 10/12 10/19 10/26 11/9 11/30 12/14 12/21 1/4/19   Total WeeklyDose 45mg 45mg 45mg 42.5mg 40mg 45mg 50mg 47.5mg 47.5mg 47.5mg 40mg 42.5mg   INR 2.26 2.38 3.03 3.35 1.96 1.56 2.18 1.8 3.13 3.0 2.13 1.92   Notes    1x decr dose incr GLV    fewer GLV 1x decr dose 2x decr dose      Date 1/25 2/18 3/18 4/1 4/12 4/23 5/16 6/5 7/9 8/2 9/19 10/29   Total WeeklyDose 42.5mg 45mg 45mg 42.5mg 40mg 40mg 40mg 40mg 40mg 40mg 40mg 40mg   INR 1.36 2.52 2.74 3.01 1.91 2.26 1.49 1.59 1.98 1.91 2.05 1.93   Notes   melatonin    keflex 5/9, abx / steroid inj 5/16          Date 11/21 12/19 1/24/20 2/20 3/13 4/30 5/28 6/19 7/16      Total WeeklyDose 40mg 40mg 40mg 40mg 40mg 40mg 40mg 40mg 40mg      INR 1.94 1.99 1.96 2.16 1.86 2.62 2.68 1.21 1.78      Notes  amox 7x days     decr GLV? incr GLV         Phone Interview:  Tablet Strength: 2.5mg and 5mg tablets  Patient Contact Info: [phone]   Lab Contact Info: [facility]    Patient Findings   Negatives:   Signs/symptoms of thrombosis, Signs/symptoms of bleeding, Laboratory test error suspected, Change in health, Change in alcohol use, Change in activity, Upcoming invasive procedure, Emergency department visit, Upcoming dental procedure, Missed doses, Extra doses, Change in medications, Change in diet/appetite, Hospital admission, Bruising, Other complaints     Plan:   1. INR is therapeutic and back WNL today at 1.78. Instructed Ms. Queen to continue warfarin 5mg oral daily except for 7.5mg on SunThurs.  2. Repeat INR in 4 weeks, 8/13.  3. Verbal information provided over the phone. Deisi Queen RBV dosing instructions, expresses understanding by teach back, and has no further questions at this time.    Genaro Rodriguez CPhT  7/16/2020  13:05    I, Jayden Cuba, [facility], have reviewed the note in full and agree with the assessment and plan.  07/16/20  13:40

## 2020-07-28 RX ORDER — WARFARIN SODIUM 2.5 MG/1
TABLET ORAL
Qty: 30 TABLET | Refills: 6 | Status: SHIPPED | OUTPATIENT
Start: 2020-07-28 | End: 2020-09-03

## 2020-08-26 ENCOUNTER — TELEPHONE (OUTPATIENT)
Dept: PHARMACY | Facility: HOSPITAL | Age: 35
End: 2020-08-26

## 2020-09-03 ENCOUNTER — LAB (OUTPATIENT)
Dept: LAB | Facility: HOSPITAL | Age: 35
End: 2020-09-03

## 2020-09-03 DIAGNOSIS — Z95.2 HX OF AORTIC VALVE REPLACEMENT, MECHANICAL: ICD-10-CM

## 2020-09-03 LAB
INR PPP: 1.71 (ref 0.9–1.1)
PROTHROMBIN TIME: 19.9 SECONDS (ref 11.9–14.1)

## 2020-09-03 PROCEDURE — 85610 PROTHROMBIN TIME: CPT

## 2020-09-03 PROCEDURE — 36415 COLL VENOUS BLD VENIPUNCTURE: CPT

## 2020-09-03 RX ORDER — WARFARIN SODIUM 5 MG/1
TABLET ORAL
Qty: 120 TABLET | Refills: 0 | OUTPATIENT
Start: 2020-09-03

## 2020-09-03 RX ORDER — WARFARIN SODIUM 2.5 MG/1
TABLET ORAL
Qty: 16 TABLET | Refills: 0 | Status: SHIPPED | OUTPATIENT
Start: 2020-09-03 | End: 2020-09-04 | Stop reason: SDUPTHER

## 2020-09-03 NOTE — TELEPHONE ENCOUNTER
Received refill request.  Contacted patient,   LVHELEN re. Overdue PT/ INR.      Requested that she contact the clinic asap to discuss.  Last seen 7/16, INR due to be rechecked on 8/13.      Sarah Zavala, NeshaD

## 2020-09-04 ENCOUNTER — ANTICOAGULATION VISIT (OUTPATIENT)
Dept: PHARMACY | Facility: HOSPITAL | Age: 35
End: 2020-09-04

## 2020-09-04 DIAGNOSIS — Z95.2 HX OF AORTIC VALVE REPLACEMENT, MECHANICAL: ICD-10-CM

## 2020-09-04 RX ORDER — WARFARIN SODIUM 2.5 MG/1
TABLET ORAL
Qty: 180 TABLET | Refills: 1 | Status: SHIPPED | OUTPATIENT
Start: 2020-09-04 | End: 2021-10-01 | Stop reason: SDUPTHER

## 2020-09-04 RX ORDER — WARFARIN SODIUM 5 MG/1
TABLET ORAL
Qty: 90 TABLET | Refills: 1 | Status: SHIPPED | OUTPATIENT
Start: 2020-09-04 | End: 2021-06-17

## 2020-09-04 NOTE — PROGRESS NOTES
Anticoagulation Clinic - Remote Progress Note  REMOTE LAB    Indication: mechanical AVR (On-X)  Referring Provider: Latoya (Last seen: 11/19/18  next appt: 11/25/19)  Goal INR: 1.5-2.5  Current Drug Interactions: aspirin; melatonin    Diet: brussel sprouts 1x/week 6/19/2020  Alcohol: none  Tobacco: none  OTC Pain Medication: APAP PRN    INR History:  Date 8/16 8/25 9/6 9/15 10/2 10/16 10/30 11/20 12/8 12/26 1/9/18 1/22   Total WeeklyDose 57.5mg 57.5mg 52.5mg 37.5mg  - 45mg 52.5mg 52.5mg 52.5mg 45mg 55mg 55mg 52.5mg 52.5mg   INR 2.76 2.77 2.70 1.7 1.55 2.04 2.93 1.42 2.55 2.72 2.3 2.35   Notes   decr GLV 2x miss?  Thrive Thrive 1x miss ill; dex / roceph IM inj. flu; tamiflu       Date 2/6 2/22 3/13 4/5 4/16 4/24 4/27 5/2 5/18 6/8 7/9 8/2   Total WeeklyDose 52.5mg 52.5mg 45mg 50mg 47.5mg 45mg 45mg 45mg 45mg 45mg 45mg 45mg   INR 2.68 2.3 2.55 2.72 2.57 2.34 2.37 2.03 2.05 2.16 1.83 2.84   Notes   1x miss  2x miss ill; dex / roceph IM inj.  zpak         Date 8/16 8/30 10/1 10/8 10/12 10/19 10/26 11/9 11/30 12/14 12/21 1/4/19   Total WeeklyDose 45mg 45mg 45mg 42.5mg 40mg 45mg 50mg 47.5mg 47.5mg 47.5mg 40mg 42.5mg   INR 2.26 2.38 3.03 3.35 1.96 1.56 2.18 1.8 3.13 3.0 2.13 1.92   Notes    1x decr dose incr GLV    decr GLV 1x decr dose 2x decr dose      Date 1/25 2/18 3/18 4/1 4/12 4/23 5/16 6/5 7/9 8/2 9/19 10/29   Total WeeklyDose 42.5mg 45mg 45mg 42.5mg 40mg 40mg 40mg 40mg 40mg 40mg 40mg 40mg   INR 1.36 2.52 2.74 3.01 1.91 2.26 1.49 1.59 1.98 1.91 2.05 1.93   Notes   melatonin    keflex 5/9, abx / steroid inj 5/16          Date 11/21 12/19 1/24/20 2/20 3/13 4/30 5/28 6/19 7/16 9/3     Total WeeklyDose 40mg 40mg 40mg 40mg 40mg 40mg 40mg 40mg 40mg 40mg     INR 1.94 1.99 1.96 2.16 1.86 2.62 2.68 1.21 1.78 1.71     Notes  amox x7d     decr GLV? incr GLV         Phone Interview:  Tablet Strength: 2.5mg and 5mg tablets  Patient Contact Info: [phone]   Lab Contact Info: [facility]    Patient Findings   Negatives:   Signs/symptoms of thrombosis, Signs/symptoms of bleeding, Laboratory test error suspected, Change in health, Change in alcohol use, Change in activity, Upcoming invasive procedure, Emergency department visit, Upcoming dental procedure, Missed doses, Extra doses, Change in medications, Change in diet/appetite, Hospital admission, Bruising, Other complaints   Comments:  Patient denies all findings     Plan:   1. INR was therapeutic yesterday at 1.71. Instructed Ms. Queen to continue warfarin 5mg oral daily except for 7.5mg on SunThurs.  2. Recommend repeat INR in 4 weeks, 10/1. Due to patient's work/family situation, she admits it may be closer to 5 weeks before she can have labs drawn again.  3. Verbal information provided over the phone. Deisi Queen RBV dosing instructions, expresses understanding by teach back, and has no further questions at this time.  4. Patient requests refills on her warfarin 5mg tablets to be called into [facility] in [place], [place]    Genaro Rodriguez CPhT  9/4/2020  07:52    Sent 2.5mg tablets per patient Paulette Nicholas, PharmD, have reviewed the note in full and agree with the assessment and plan.  09/04/20  10:03     Patient called, requesting 5mg tablets. Sent to pharmacy.  Kranthi Flores, NeshaD  Pharmacy Resident  9/4/2020  10:42

## 2020-09-08 NOTE — TELEPHONE ENCOUNTER
Just sent 7 day supply  
Patient overdue to recheck INR.  Has multiple refills available on 2.5 mg tablets. LVM for patient
Rx sent on 9/4 after received INR check
Yes...

## 2020-09-15 ENCOUNTER — LAB REQUISITION (OUTPATIENT)
Dept: LAB | Facility: HOSPITAL | Age: 35
End: 2020-09-15

## 2020-09-15 DIAGNOSIS — Z20.828 CONTACT WITH AND (SUSPECTED) EXPOSURE TO OTHER VIRAL COMMUNICABLE DISEASES: ICD-10-CM

## 2020-09-15 LAB — SARS-COV-2 RNA NOSE QL NAA+PROBE: DETECTED

## 2020-09-15 PROCEDURE — U0004 COV-19 TEST NON-CDC HGH THRU: HCPCS | Performed by: NURSE PRACTITIONER

## 2020-10-21 ENCOUNTER — TELEPHONE (OUTPATIENT)
Dept: PHARMACY | Facility: HOSPITAL | Age: 35
End: 2020-10-21

## 2020-11-04 ENCOUNTER — LAB (OUTPATIENT)
Dept: LAB | Facility: HOSPITAL | Age: 35
End: 2020-11-04

## 2020-11-04 ENCOUNTER — ANTICOAGULATION VISIT (OUTPATIENT)
Dept: PHARMACY | Facility: HOSPITAL | Age: 35
End: 2020-11-04

## 2020-11-04 DIAGNOSIS — Z95.2 HX OF AORTIC VALVE REPLACEMENT, MECHANICAL: ICD-10-CM

## 2020-11-04 LAB
INR PPP: 1.46 (ref 0.9–1.1)
PROTHROMBIN TIME: 17.6 SECONDS (ref 11.9–14.1)

## 2020-11-04 PROCEDURE — 85610 PROTHROMBIN TIME: CPT

## 2020-11-04 PROCEDURE — 36415 COLL VENOUS BLD VENIPUNCTURE: CPT

## 2020-11-04 NOTE — PROGRESS NOTES
Anticoagulation Clinic - Remote Progress Note  REMOTE LAB    Indication: mechanical AVR (On-X)  Referring Provider: Latoya (Last seen: 11/19/18  next appt: 11/25/19)  Goal INR: 1.5-2.5  Current Drug Interactions: aspirin; melatonin    Diet: brussel sprouts 1x/week 6/19/2020  Alcohol: none  Tobacco: none  OTC Pain Medication: APAP PRN    INR History:  Date 8/16 8/25 9/6 9/15 10/2 10/16 10/30 11/20 12/8 12/26 1/9/18 1/22   Total WeeklyDose 57.5mg 57.5mg 52.5mg 37.5mg  - 45mg 52.5mg 52.5mg 52.5mg 45mg 55mg 55mg 52.5mg 52.5mg   INR 2.76 2.77 2.70 1.7 1.55 2.04 2.93 1.42 2.55 2.72 2.3 2.35   Notes   decr GLV 2x miss?  Thrive Thrive 1x miss ill; dex / roceph IM inj. flu; tamiflu       Date 2/6 2/22 3/13 4/5 4/16 4/24 4/27 5/2 5/18 6/8 7/9 8/2   Total WeeklyDose 52.5mg 52.5mg 45mg 50mg 47.5mg 45mg 45mg 45mg 45mg 45mg 45mg 45mg   INR 2.68 2.3 2.55 2.72 2.57 2.34 2.37 2.03 2.05 2.16 1.83 2.84   Notes   1x miss  2x miss ill; dex / roceph IM inj.  zpak         Date 8/16 8/30 10/1 10/8 10/12 10/19 10/26 11/9 11/30 12/14 12/21 1/4/19   Total WeeklyDose 45mg 45mg 45mg 42.5mg 40mg 45mg 50mg 47.5mg 47.5mg 47.5mg 40mg 42.5mg   INR 2.26 2.38 3.03 3.35 1.96 1.56 2.18 1.8 3.13 3.0 2.13 1.92   Notes    1x decr dose incr GLV    decr GLV 1x decr dose 2x decr dose      Date 1/25 2/18 3/18 4/1 4/12 4/23 5/16 6/5 7/9 8/2 9/19 10/29   Total WeeklyDose 42.5mg 45mg 45mg 42.5mg 40mg 40mg 40mg 40mg 40mg 40mg 40mg 40mg   INR 1.36 2.52 2.74 3.01 1.91 2.26 1.49 1.59 1.98 1.91 2.05 1.93   Notes   melatonin    keflex 5/9, abx / steroid inj 5/16          Date 11/21 12/19 1/24/20 2/20 3/13 4/30 5/28 6/19 7/16 9/3 11/4    Total WeeklyDose 40mg 40mg 40mg 40mg 40mg 40mg 40mg 40mg 40mg 40mg     INR 1.94 1.99 1.96 2.16 1.86 2.62 2.68 1.21 1.78 1.71 1.46    Notes  amox x7d     decr GLV? incr GLV   overdue      Phone Interview:  Tablet Strength: 2.5mg and 5mg tablets  Patient Contact Info: [phone]   Lab Contact Info:  [facility]    Patient Findings      Positives:  Change in diet/appetite   Negatives:  Signs/symptoms of thrombosis, Signs/symptoms of bleeding, Laboratory test error suspected, Change in health, Change in alcohol use, Change in activity, Upcoming invasive procedure, Emergency department visit, Upcoming dental procedure, Missed doses, Extra doses, Change in medications, Hospital admission, Bruising, Other complaints   Comments:  Denies all findings, possible she ate more salad than usual . Discussed consistency       Plan:   1. INR was therapeutic yesterday at 1.46. Instructed Ms. Queen to BOOST tonight's dose to 7.5mg (6% increase) and continue warfarin 5mg oral daily except for 7.5mg on SunThurs.  2. Recommend repeat INR in 2 weeks, 11/18 to ensure WNL  3. Verbal information provided over the phone. Deisi Queen RBV dosing instructions, expresses understanding by teach back, and has no further questions at this time.      Paulette Gonzalez, PharmD.  11/04/20   15:37 EST

## 2020-11-27 ENCOUNTER — LAB (OUTPATIENT)
Dept: LAB | Facility: HOSPITAL | Age: 35
End: 2020-11-27

## 2020-11-27 DIAGNOSIS — Z95.2 HX OF AORTIC VALVE REPLACEMENT, MECHANICAL: ICD-10-CM

## 2020-11-27 LAB
INR PPP: 1.93 (ref 0.9–1.1)
PROTHROMBIN TIME: 22 SECONDS (ref 11.9–14.1)

## 2020-11-27 PROCEDURE — 36415 COLL VENOUS BLD VENIPUNCTURE: CPT

## 2020-11-27 PROCEDURE — 85610 PROTHROMBIN TIME: CPT

## 2020-11-30 ENCOUNTER — ANTICOAGULATION VISIT (OUTPATIENT)
Dept: PHARMACY | Facility: HOSPITAL | Age: 35
End: 2020-11-30

## 2020-11-30 ENCOUNTER — OFFICE VISIT (OUTPATIENT)
Dept: CARDIOLOGY | Facility: CLINIC | Age: 35
End: 2020-11-30

## 2020-11-30 ENCOUNTER — HOSPITAL ENCOUNTER (OUTPATIENT)
Dept: CARDIOLOGY | Facility: HOSPITAL | Age: 35
Discharge: HOME | End: 2020-11-30
Admitting: INTERNAL MEDICINE

## 2020-11-30 DIAGNOSIS — R00.2 PALPITATIONS: ICD-10-CM

## 2020-11-30 DIAGNOSIS — Q23.1 BICUSPID AORTIC VALVE: ICD-10-CM

## 2020-11-30 DIAGNOSIS — I35.0 NONRHEUMATIC AORTIC VALVE STENOSIS: Primary | ICD-10-CM

## 2020-11-30 DIAGNOSIS — E78.2 MIXED HYPERLIPIDEMIA: ICD-10-CM

## 2020-11-30 DIAGNOSIS — Z95.2 HX OF AORTIC VALVE REPLACEMENT, MECHANICAL: ICD-10-CM

## 2020-11-30 LAB
BH CV ECHO MEAS - AI DEC SLOPE: 74.8 CM/SEC^2
BH CV ECHO MEAS - AI MAX PG: 14 MMHG
BH CV ECHO MEAS - AI MAX VEL: 187 CM/SEC
BH CV ECHO MEAS - AI P1/2T: 732.2 MSEC
BH CV ECHO MEAS - AO MAX PG (FULL): 3.7 MMHG
BH CV ECHO MEAS - AO MAX PG: 13.1 MMHG
BH CV ECHO MEAS - AO MEAN PG (FULL): 1 MMHG
BH CV ECHO MEAS - AO MEAN PG: 6 MMHG
BH CV ECHO MEAS - AO ROOT AREA (BSA CORRECTED): 1.7
BH CV ECHO MEAS - AO ROOT AREA: 7.5 CM^2
BH CV ECHO MEAS - AO ROOT DIAM: 3.1 CM
BH CV ECHO MEAS - AO V2 MAX: 181 CM/SEC
BH CV ECHO MEAS - AO V2 MEAN: 116 CM/SEC
BH CV ECHO MEAS - AO V2 VTI: 36.5 CM
BH CV ECHO MEAS - ASC AORTA: 2.4 CM
BH CV ECHO MEAS - AVA(I,A): 2.5 CM^2
BH CV ECHO MEAS - AVA(I,D): 2.5 CM^2
BH CV ECHO MEAS - AVA(V,A): 2.4 CM^2
BH CV ECHO MEAS - AVA(V,D): 2.4 CM^2
BH CV ECHO MEAS - BSA(HAYCOCK): 1.9 M^2
BH CV ECHO MEAS - BSA: 1.8 M^2
BH CV ECHO MEAS - BZI_BMI: 33.3 KILOGRAMS/M^2
BH CV ECHO MEAS - BZI_METRIC_HEIGHT: 157.5 CM
BH CV ECHO MEAS - BZI_METRIC_WEIGHT: 82.6 KG
BH CV ECHO MEAS - EDV(CUBED): 99.3 ML
BH CV ECHO MEAS - EDV(MOD-SP2): 79 ML
BH CV ECHO MEAS - EDV(MOD-SP4): 101 ML
BH CV ECHO MEAS - EDV(TEICH): 98.8 ML
BH CV ECHO MEAS - EF(CUBED): 73.6 %
BH CV ECHO MEAS - EF(MOD-BP): 61 %
BH CV ECHO MEAS - EF(MOD-SP2): 63.3 %
BH CV ECHO MEAS - EF(MOD-SP4): 62.4 %
BH CV ECHO MEAS - EF(TEICH): 65.4 %
BH CV ECHO MEAS - ESV(CUBED): 26.2 ML
BH CV ECHO MEAS - ESV(MOD-SP2): 29 ML
BH CV ECHO MEAS - ESV(MOD-SP4): 38 ML
BH CV ECHO MEAS - ESV(TEICH): 34.2 ML
BH CV ECHO MEAS - FS: 35.9 %
BH CV ECHO MEAS - IVS/LVPW: 0.95
BH CV ECHO MEAS - IVSD: 0.8 CM
BH CV ECHO MEAS - LA DIMENSION: 3.8 CM
BH CV ECHO MEAS - LA/AO: 1.2
BH CV ECHO MEAS - LAD MAJOR: 5.6 CM
BH CV ECHO MEAS - LAT PEAK E' VEL: 5.5 CM/SEC
BH CV ECHO MEAS - LATERAL E/E' RATIO: 16.3
BH CV ECHO MEAS - LV DIASTOLIC VOL/BSA (35-75): 55 ML/M^2
BH CV ECHO MEAS - LV MASS(C)D: 122.9 GRAMS
BH CV ECHO MEAS - LV MASS(C)DI: 66.9 GRAMS/M^2
BH CV ECHO MEAS - LV MAX PG: 9.4 MMHG
BH CV ECHO MEAS - LV MEAN PG: 5 MMHG
BH CV ECHO MEAS - LV SYSTOLIC VOL/BSA (12-30): 20.7 ML/M^2
BH CV ECHO MEAS - LV V1 MAX: 153 CM/SEC
BH CV ECHO MEAS - LV V1 MEAN: 96.3 CM/SEC
BH CV ECHO MEAS - LV V1 VTI: 32.4 CM
BH CV ECHO MEAS - LVIDD: 4.6 CM
BH CV ECHO MEAS - LVIDS: 3 CM
BH CV ECHO MEAS - LVLD AP2: 7.5 CM
BH CV ECHO MEAS - LVLD AP4: 7.4 CM
BH CV ECHO MEAS - LVLS AP2: 5.6 CM
BH CV ECHO MEAS - LVLS AP4: 6.3 CM
BH CV ECHO MEAS - LVOT AREA (M): 2.8 CM^2
BH CV ECHO MEAS - LVOT AREA: 2.8 CM^2
BH CV ECHO MEAS - LVOT DIAM: 1.9 CM
BH CV ECHO MEAS - LVPWD: 0.84 CM
BH CV ECHO MEAS - MED PEAK E' VEL: 9.4 CM/SEC
BH CV ECHO MEAS - MEDIAL E/E' RATIO: 9.6
BH CV ECHO MEAS - MV A MAX VEL: 42.4 CM/SEC
BH CV ECHO MEAS - MV DEC SLOPE: 237 CM/SEC^2
BH CV ECHO MEAS - MV DEC TIME: 0.31 SEC
BH CV ECHO MEAS - MV E MAX VEL: 90.3 CM/SEC
BH CV ECHO MEAS - MV E/A: 2.1
BH CV ECHO MEAS - MV P1/2T MAX VEL: 101 CM/SEC
BH CV ECHO MEAS - MV P1/2T: 124.8 MSEC
BH CV ECHO MEAS - MVA P1/2T LCG: 2.2 CM^2
BH CV ECHO MEAS - MVA(P1/2T): 1.8 CM^2
BH CV ECHO MEAS - PA ACC SLOPE: 551 CM/SEC^2
BH CV ECHO MEAS - PA ACC TIME: 0.15 SEC
BH CV ECHO MEAS - PA PR(ACCEL): 11.1 MMHG
BH CV ECHO MEAS - RAP SYSTOLE: 3 MMHG
BH CV ECHO MEAS - RVSP: 17 MMHG
BH CV ECHO MEAS - SI(AO): 150 ML/M^2
BH CV ECHO MEAS - SI(CUBED): 39.8 ML/M^2
BH CV ECHO MEAS - SI(LVOT): 50 ML/M^2
BH CV ECHO MEAS - SI(MOD-SP2): 27.2 ML/M^2
BH CV ECHO MEAS - SI(MOD-SP4): 34.3 ML/M^2
BH CV ECHO MEAS - SI(TEICH): 35.2 ML/M^2
BH CV ECHO MEAS - SV(AO): 275.5 ML
BH CV ECHO MEAS - SV(CUBED): 73.1 ML
BH CV ECHO MEAS - SV(LVOT): 91.9 ML
BH CV ECHO MEAS - SV(MOD-SP2): 50 ML
BH CV ECHO MEAS - SV(MOD-SP4): 63 ML
BH CV ECHO MEAS - SV(TEICH): 64.7 ML
BH CV ECHO MEAS - TR MAX PG: 14 MMHG
BH CV ECHO MEAS - TR MAX VEL: 186 CM/SEC
BH CV ECHO MEASUREMENTS AVERAGE E/E' RATIO: 12.12
BH CV XLRA - RV BASE: 2.9 CM
BH CV XLRA - RV LENGTH: 4.4 CM
BH CV XLRA - RV MID: 2.2 CM
LEFT ATRIUM VOLUME INDEX: 30.5 ML/M^2
LEFT ATRIUM VOLUME: 56 ML
LV EF 2D ECHO EST: 60 %

## 2020-11-30 PROCEDURE — 93306 TTE W/DOPPLER COMPLETE: CPT | Performed by: INTERNAL MEDICINE

## 2020-11-30 PROCEDURE — 99213 OFFICE O/P EST LOW 20 MIN: CPT | Performed by: INTERNAL MEDICINE

## 2020-11-30 PROCEDURE — 93306 TTE W/DOPPLER COMPLETE: CPT

## 2020-11-30 NOTE — PROGRESS NOTES
Anticoagulation Clinic - Remote Progress Note  REMOTE LAB    Indication: mechanical AVR (On-X)  Referring Provider: Latoya (Last seen: 11/19/18  next appt: 11/25/19)  Goal INR: 1.5-2.5  Current Drug Interactions: aspirin; melatonin    Diet: brussel sprouts or equivalent 1x/week 11/30/2020  Alcohol: none  Tobacco: none  OTC Pain Medication: APAP PRN    INR History:  Date 8/16 8/25 9/6 9/15 10/2 10/16 10/30 11/20 12/8 12/26 1/9/18 1/22   Total WeeklyDose 57.5mg 57.5mg 52.5mg 37.5mg  - 45mg 52.5mg 52.5mg 52.5mg 45mg 55mg 55mg 52.5mg 52.5mg   INR 2.76 2.77 2.70 1.7 1.55 2.04 2.93 1.42 2.55 2.72 2.3 2.35   Notes   decr GLV 2x miss?  Thrive Thrive 1x miss ill; dex / roceph IM inj. flu; tamiflu       Date 2/6 2/22 3/13 4/5 4/16 4/24 4/27 5/2 5/18 6/8 7/9 8/2   Total WeeklyDose 52.5mg 52.5mg 45mg 50mg 47.5mg 45mg 45mg 45mg 45mg 45mg 45mg 45mg   INR 2.68 2.3 2.55 2.72 2.57 2.34 2.37 2.03 2.05 2.16 1.83 2.84   Notes   1x miss  2x miss ill; dex / roceph IM inj.  zpak         Date 8/16 8/30 10/1 10/8 10/12 10/19 10/26 11/9 11/30 12/14 12/21 1/4/19   Total WeeklyDose 45mg 45mg 45mg 42.5mg 40mg 45mg 50mg 47.5mg 47.5mg 47.5mg 40mg 42.5mg   INR 2.26 2.38 3.03 3.35 1.96 1.56 2.18 1.8 3.13 3.0 2.13 1.92   Notes    1x decr dose incr GLV    decr GLV 1x decr dose 2x decr dose      Date 1/25 2/18 3/18 4/1 4/12 4/23 5/16 6/5 7/9 8/2 9/19 10/29   Total WeeklyDose 42.5mg 45mg 45mg 42.5mg 40mg 40mg 40mg 40mg 40mg 40mg 40mg 40mg   INR 1.36 2.52 2.74 3.01 1.91 2.26 1.49 1.59 1.98 1.91 2.05 1.93   Notes   melatonin    keflex 5/9, abx / steroid inj 5/16          Date 11/21 12/19 1/24/20 2/20 3/13 4/30 5/28 6/19 7/16 9/3 11/4 11/27   Total WeeklyDose 40mg 40mg 40mg 40mg 40mg 40mg 40mg 40mg 40mg 40mg  40mg   INR 1.94 1.99 1.96 2.16 1.86 2.62 2.68 1.21 1.78 1.71 1.46 1.93   Notes  amox x7d     decr GLV? incr GLV   overdue Overdue; rcvd 11/30     Phone Interview:  Tablet Strength: 2.5mg and 5mg tablets  Patient Contact Info: [phone]   Lab  Contact Info: [facility]    Patient Findings  Negatives:  Signs/symptoms of thrombosis, Signs/symptoms of bleeding, Laboratory test error suspected, Change in health, Change in alcohol use, Change in activity, Upcoming invasive procedure, Emergency department visit, Upcoming dental procedure, Missed doses, Extra doses, Change in medications, Change in diet/appetite, Hospital admission, Bruising, Other complaints     Plan:   1. INR was therapeutic yesterday at 1.93. Instructed Ms. Queen to continue warfarin 5mg oral daily except for 7.5mg on SunThurs.  2. Recommend repeat INR in 4 weeks on 12/30 pending work schedule   3. Verbal information provided over the phone. Deisi Queen RBV dosing instructions, expresses understanding by teach back, and has no further questions at this time.      Carmelita Morales, PharmD  11/30/20   08:44 EST

## 2020-11-30 NOTE — PROGRESS NOTES
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        PROBLEM LIST:   1. Bicuspid aortic valve with combined aortic stenosis/aortic insufficiency.  a. In 2005, remote onset of auscultated murmur by primary care physician with ELVIRA per Dr. Ortiz with bicuspid findings.   b. 9/16 #25 AVR On X Dacron tube  graft & root replacement  w coronary reimplantation-Dr Bazzi  1. periop tamponade requiring window  c. 11/17 echo acceptable AVR , EF 60%  d. 11/2020 echo: EF 60%, concentric LVH, normal function mechanical AV  2. Atypical chest pain.  a. On 4/16/2012, GXT stress test revealing 12.8 MET exercise. T-wave inversion in inferior leads and in leads 4 - leads 6 and no exercise induced arrhythmias.  3. Palpitations.   a. On 4/17/2012, 24-hour Holter monitor revealing heart rate ranging from  beats per minute with 56 PVCs, no pauses.  4. HLD  a. 11/21/2019 lipids:   HDL 36   5. Childhood asthma.  6. Surgical history: None.  7. G2, P3      Fu AVR    Allergies  Allergies   Allergen Reactions   • Flagyl [Metronidazole] Itching       Current Medications    Current Outpatient Medications:   •  fexofenadine (ALLEGRA) 180 MG tablet, Take 180 mg by mouth Daily As Needed. For allergies, Disp: , Rfl: 2  •  fluticasone (FLONASE) 50 MCG/ACT nasal spray, 1 spray into the nostril(s) as directed by provider 2 (Two) Times a Day As Needed., Disp: , Rfl: 0  •  melatonin 5 MG tablet tablet, Take 5 mg by mouth Daily., Disp: , Rfl:   •  Multiple Vitamins-Minerals (MULTI ADULT GUMMIES PO), Take  by mouth Daily., Disp: , Rfl:   •  warfarin (COUMADIN) 2.5 MG tablet, Take two to three tablets by mouth daily or as directed by the anticoagulation clinic., Disp: 180 tablet, Rfl: 1  •  warfarin (Coumadin) 5 MG tablet,  Take 1 to 1.5 tablets by mouth daily or as directed by the anticoagulation clinic, Disp: 90 tablet, Rfl: 1      History of Present Illness   Deisi Queen is a 35 y.o. year old female here for follow up.  She had had Covid earlier this year and states she did manage had a sinus infection.  Otherwise she she is felt well from a cardiac standpoint      OBJECTIVE:  HR 65  / 72   Physical Exam   Constitutional: She is oriented to person, place, and time. She appears well-developed and well-nourished. No distress.   overweight   Cardiovascular: Normal rate, regular rhythm, normal heart sounds and intact distal pulses.   No murmur heard.  Pulses:       Radial pulses are 2+ on the right side and 2+ on the left side.        Dorsalis pedis pulses are 2+ on the right side and 2+ on the left side.        Posterior tibial pulses are 2+ on the right side and 2+ on the left side.   Mechanical click   Pulmonary/Chest: Effort normal and breath sounds normal. She has no wheezes. She has no rales.   Abdominal: Soft. Bowel sounds are normal. There is no abdominal tenderness. There is no guarding.   Musculoskeletal:         General: No tenderness or edema.   Neurological: She is alert and oriented to person, place, and time.   Skin: Skin is warm and dry. No rash noted.   Psychiatric: She has a normal mood and affect.   Nursing note and vitals reviewed.      Diagnostic Data:  Procedures      ASSESSMENT:   Diagnosis Plan   1. Nonrheumatic aortic valve stenosis     2. Palpitations     3. Mixed hyperlipidemia         PLAN:  1. Acceptable AVR mechanical function.  2. Patient counseled elevated triglycerides can precursor of insulin resistance.  Counseled on limiting starch rich foods, counseled on getting regular exercise.  Counseled on dementia risk reduction with exercise.      Hans Mary PA, thank you for referring Ms. Queen for evaluation.  I have forwarded my electronically generated recommendations to you for  review.  Please do not hesitate to call with any questions.  .  11/30/2020  09:17 ABY Klein MD, FACC

## 2021-01-07 ENCOUNTER — TELEPHONE (OUTPATIENT)
Dept: PHARMACY | Facility: HOSPITAL | Age: 36
End: 2021-01-07

## 2021-01-14 ENCOUNTER — LAB (OUTPATIENT)
Dept: LAB | Facility: HOSPITAL | Age: 36
End: 2021-01-14

## 2021-01-14 DIAGNOSIS — Z95.2 HX OF AORTIC VALVE REPLACEMENT, MECHANICAL: ICD-10-CM

## 2021-01-14 LAB
INR PPP: 1.97 (ref 0.9–1.1)
PROTHROMBIN TIME: 22.3 SECONDS (ref 11.9–14.1)

## 2021-01-14 PROCEDURE — 36415 COLL VENOUS BLD VENIPUNCTURE: CPT

## 2021-01-14 PROCEDURE — 85610 PROTHROMBIN TIME: CPT

## 2021-01-15 ENCOUNTER — ANTICOAGULATION VISIT (OUTPATIENT)
Dept: PHARMACY | Facility: HOSPITAL | Age: 36
End: 2021-01-15

## 2021-01-15 DIAGNOSIS — Z95.2 HX OF AORTIC VALVE REPLACEMENT, MECHANICAL: ICD-10-CM

## 2021-01-15 NOTE — PROGRESS NOTES
Anticoagulation Clinic - Remote Progress Note  REMOTE LAB    Indication: mechanical AVR (On-X)  Referring Provider: Latoya (Last seen: 11/19/18  next appt: 11/25/19)  Goal INR: 1.5-2.5  Current Drug Interactions: aspirin; melatonin    Diet: brussel sprouts or equivalent 1x/week 11/30/2020  Alcohol: none  Tobacco: none  OTC Pain Medication: APAP PRN    INR History:  Date 8/16 8/25 9/6 9/15 10/2 10/16 10/30 11/20 12/8 12/26 1/9/18 1/22   Total WeeklyDose 57.5mg 57.5mg 52.5mg 37.5mg  - 45mg 52.5mg 52.5mg 52.5mg 45mg 55mg 55mg 52.5mg 52.5mg   INR 2.76 2.77 2.70 1.7 1.55 2.04 2.93 1.42 2.55 2.72 2.3 2.35   Notes   decr GLV 2x miss?  Thrive Thrive 1x miss ill; dex / roceph IM inj. flu; tamiflu       Date 2/6 2/22 3/13 4/5 4/16 4/24 4/27 5/2 5/18 6/8 7/9 8/2   Total WeeklyDose 52.5mg 52.5mg 45mg 50mg 47.5mg 45mg 45mg 45mg 45mg 45mg 45mg 45mg   INR 2.68 2.3 2.55 2.72 2.57 2.34 2.37 2.03 2.05 2.16 1.83 2.84   Notes   1x miss  2x miss ill; dex / roceph IM inj.  zpak         Date 8/16 8/30 10/1 10/8 10/12 10/19 10/26 11/9 11/30 12/14 12/21 1/4/19   Total WeeklyDose 45mg 45mg 45mg 42.5mg 40mg 45mg 50mg 47.5mg 47.5mg 47.5mg 40mg 42.5mg   INR 2.26 2.38 3.03 3.35 1.96 1.56 2.18 1.8 3.13 3.0 2.13 1.92   Notes    1x decr dose incr GLV    decr GLV 1x decr dose 2x decr dose      Date 1/25 2/18 3/18 4/1 4/12 4/23 5/16 6/5 7/9 8/2 9/19 10/29   Total WeeklyDose 42.5mg 45mg 45mg 42.5mg 40mg 40mg 40mg 40mg 40mg 40mg 40mg 40mg   INR 1.36 2.52 2.74 3.01 1.91 2.26 1.49 1.59 1.98 1.91 2.05 1.93   Notes   melatonin    keflex 5/9, abx / steroid inj 5/16          Date 11/21 12/19 1/24/20 2/20 3/13 4/30 5/28 6/19 7/16 9/3 11/4 11/27   Total WeeklyDose 40mg 40mg 40mg 40mg 40mg 40mg 40mg 40mg 40mg 40mg 40mg 40mg   INR 1.94 1.99 1.96 2.16 1.86 2.62 2.68 1.21 1.78 1.71 1.46 1.93   Notes  amox x7d     decr GLV? incr GLV   overdue Overdue; rcvd 11/30     Date 1/14/21              Total WeeklyDose 40mg              INR 1.97              Notes Overdue;  rec'd 1/15                Phone Interview:  Tablet Strength: 2.5mg and 5mg tablets  Patient Contact Info: [phone]   Lab Contact Info: [facility]    Patient Findings  Negatives:  Signs/symptoms of thrombosis, Signs/symptoms of bleeding, Laboratory test error suspected, Change in health, Change in alcohol use, Change in activity, Upcoming invasive procedure, Emergency department visit, Upcoming dental procedure, Missed doses, Extra doses, Change in medications, Change in diet/appetite, Hospital admission, Bruising, Other complaints   Comments:  Patient again non-compliant with requested follow-up. Reports she still only has Zeus for GLV. There were no changes made at her appointment with Dr. Klein on 11/30.     Plan:   1. INR was therapeutic yesterday at 1.97 (goal 1.5-2.5). Instructed Ms. Queen to continue warfarin 5mg oral daily except for 7.5mg on SunThurs.  2. Recommend repeat INR in 5 weeks.  3. Verbal information provided over the phone. Deisi Queen RBV dosing instructions, expresses understanding by teach back, and has no further questions at this time.    Rosamaria Armstrong, Pharmacy Intern  01/15/21  08:27 Paulette MANZANO, PharmD, have reviewed the note in full and agree with the assessment and plan.  01/15/21  09:39 EST

## 2021-02-19 ENCOUNTER — ANTICOAGULATION VISIT (OUTPATIENT)
Dept: PHARMACY | Facility: HOSPITAL | Age: 36
End: 2021-02-19

## 2021-02-19 ENCOUNTER — LAB (OUTPATIENT)
Dept: LAB | Facility: HOSPITAL | Age: 36
End: 2021-02-19

## 2021-02-19 DIAGNOSIS — Z95.2 HX OF AORTIC VALVE REPLACEMENT, MECHANICAL: ICD-10-CM

## 2021-02-19 LAB
INR PPP: 2.26 (ref 0.9–1.1)
PROTHROMBIN TIME: 24.8 SECONDS (ref 11.9–14.1)

## 2021-02-19 PROCEDURE — 85610 PROTHROMBIN TIME: CPT

## 2021-02-19 PROCEDURE — 36415 COLL VENOUS BLD VENIPUNCTURE: CPT

## 2021-02-19 NOTE — PROGRESS NOTES
Anticoagulation Clinic - Remote Progress Note  REMOTE LAB    Indication: mechanical AVR (On-X)  Referring Provider: Latoya (Last seen: 11/19/18  next appt: 11/25/19)  Goal INR: 1.5-2.5  Current Drug Interactions: aspirin; melatonin    Diet: brussel sprouts or equivalent 1x/week 11/30/2020  Alcohol: none  Tobacco: none  OTC Pain Medication: APAP PRN    INR History:  Date 8/16 8/25 9/6 9/15 10/2 10/16 10/30 11/20 12/8 12/26 1/9/18 1/22   Total WeeklyDose 57.5mg 57.5mg 52.5mg 37.5mg  - 45mg 52.5mg 52.5mg 52.5mg 45mg 55mg 55mg 52.5mg 52.5mg   INR 2.76 2.77 2.70 1.7 1.55 2.04 2.93 1.42 2.55 2.72 2.3 2.35   Notes   decr GLV 2x miss?  Thrive Thrive 1x miss ill; dex / roceph IM inj. flu; tamiflu       Date 2/6 2/22 3/13 4/5 4/16 4/24 4/27 5/2 5/18 6/8 7/9 8/2   Total WeeklyDose 52.5mg 52.5mg 45mg 50mg 47.5mg 45mg 45mg 45mg 45mg 45mg 45mg 45mg   INR 2.68 2.3 2.55 2.72 2.57 2.34 2.37 2.03 2.05 2.16 1.83 2.84   Notes   1x miss  2x miss ill; dex / roceph IM inj.  zpak         Date 8/16 8/30 10/1 10/8 10/12 10/19 10/26 11/9 11/30 12/14 12/21 1/4/19   Total WeeklyDose 45mg 45mg 45mg 42.5mg 40mg 45mg 50mg 47.5mg 47.5mg 47.5mg 40mg 42.5mg   INR 2.26 2.38 3.03 3.35 1.96 1.56 2.18 1.8 3.13 3.0 2.13 1.92   Notes    1x decr dose incr GLV    decr GLV 1x decr dose 2x decr dose      Date 1/25 2/18 3/18 4/1 4/12 4/23 5/16 6/5 7/9 8/2 9/19 10/29   Total WeeklyDose 42.5mg 45mg 45mg 42.5mg 40mg 40mg 40mg 40mg 40mg 40mg 40mg 40mg   INR 1.36 2.52 2.74 3.01 1.91 2.26 1.49 1.59 1.98 1.91 2.05 1.93   Notes   melatonin    keflex 5/9, abx / steroid inj 5/16          Date 11/21 12/19 1/24/20 2/20 3/13 4/30 5/28 6/19 7/16 9/3 11/4 11/27   Total WeeklyDose 40mg 40mg 40mg 40mg 40mg 40mg 40mg 40mg 40mg 40mg 40mg 40mg   INR 1.94 1.99 1.96 2.16 1.86 2.62 2.68 1.21 1.78 1.71 1.46 1.93   Notes  amox x7d     decr GLV? incr GLV   overdue overdue; rcvd 11/30     Date 1/14/21 2/19             Total WeeklyDose 40mg 40mg             INR 1.97 2.26              Notes overdue; rec'd 1/15                Phone Interview:  Tablet Strength: 2.5mg and 5mg tablets  Patient Contact Info: [phone]   Lab Contact Info: [facility]    Patient Findings  Negatives:  Signs/symptoms of thrombosis, Signs/symptoms of bleeding, Laboratory test error suspected, Change in health, Change in alcohol use, Change in activity, Upcoming invasive procedure, Emergency department visit, Upcoming dental procedure, Missed doses, Extra doses, Change in medications, Change in diet/appetite, Hospital admission, Bruising, Other complaints   Comments:  Patient denies all findings      Plan:   1. INR is therapeutic today at 2.26 (goal 1.5-2.5). Instructed Ms. Queen to continue warfarin 5mg oral daily except for 7.5mg on SunThurs.  2. Recommend repeat INR in 5 weeks, 3/26  3. Verbal information provided over the phone. Deisi Queen RBV dosing instructions, expresses understanding by teach back, and has no further questions at this time.    Genaro Rodriguez, [place]  2/19/2021  15:47 EST    ICarmelita, PharmD, have reviewed the note in full and agree with the assessment and plan.  02/19/21  15:56 EST

## 2021-04-05 ENCOUNTER — TELEPHONE (OUTPATIENT)
Dept: PHARMACY | Facility: HOSPITAL | Age: 36
End: 2021-04-05

## 2021-04-07 ENCOUNTER — LAB (OUTPATIENT)
Dept: LAB | Facility: HOSPITAL | Age: 36
End: 2021-04-07

## 2021-04-07 ENCOUNTER — ANTICOAGULATION VISIT (OUTPATIENT)
Dept: PHARMACY | Facility: HOSPITAL | Age: 36
End: 2021-04-07

## 2021-04-07 DIAGNOSIS — Z95.2 HX OF AORTIC VALVE REPLACEMENT, MECHANICAL: ICD-10-CM

## 2021-04-07 LAB
INR PPP: 2.11 (ref 0.9–1.1)
PROTHROMBIN TIME: 23.5 SECONDS (ref 11.9–14.1)

## 2021-04-07 PROCEDURE — 85610 PROTHROMBIN TIME: CPT

## 2021-04-07 PROCEDURE — 36415 COLL VENOUS BLD VENIPUNCTURE: CPT

## 2021-04-07 NOTE — PROGRESS NOTES
Anticoagulation Clinic - Remote Progress Note  REMOTE LAB    Indication: mechanical AVR (On-X)  Referring Provider: Latoya (Last seen: 11/19/18  next appt: 11/25/19)  Goal INR: 1.5-2.5  Current Drug Interactions: aspirin; melatonin    Diet: brussel sprouts or equivalent 1x/week 11/30/2020  Alcohol: none  Tobacco: none  OTC Pain Medication: APAP PRN    INR History:  Date 8/16 8/25 9/6 9/15 10/2 10/16 10/30 11/20 12/8 12/26 1/9/18 1/22   Total WeeklyDose 57.5mg 57.5mg 52.5mg 37.5mg  - 45mg 52.5mg 52.5mg 52.5mg 45mg 55mg 55mg 52.5mg 52.5mg   INR 2.76 2.77 2.70 1.7 1.55 2.04 2.93 1.42 2.55 2.72 2.3 2.35   Notes   decr GLV 2x miss?  Thrive Thrive 1x miss ill; dex / roceph IM inj. flu; tamiflu       Date 2/6 2/22 3/13 4/5 4/16 4/24 4/27 5/2 5/18 6/8 7/9 8/2   Total WeeklyDose 52.5mg 52.5mg 45mg 50mg 47.5mg 45mg 45mg 45mg 45mg 45mg 45mg 45mg   INR 2.68 2.3 2.55 2.72 2.57 2.34 2.37 2.03 2.05 2.16 1.83 2.84   Notes   1x miss  2x miss ill; dex / roceph IM inj.  zpak         Date 8/16 8/30 10/1 10/8 10/12 10/19 10/26 11/9 11/30 12/14 12/21 1/4/19   Total WeeklyDose 45mg 45mg 45mg 42.5mg 40mg 45mg 50mg 47.5mg 47.5mg 47.5mg 40mg 42.5mg   INR 2.26 2.38 3.03 3.35 1.96 1.56 2.18 1.8 3.13 3.0 2.13 1.92   Notes    1x decr dose incr GLV    decr GLV 1x decr dose 2x decr dose      Date 1/25 2/18 3/18 4/1 4/12 4/23 5/16 6/5 7/9 8/2 9/19 10/29   Total WeeklyDose 42.5mg 45mg 45mg 42.5mg 40mg 40mg 40mg 40mg 40mg 40mg 40mg 40mg   INR 1.36 2.52 2.74 3.01 1.91 2.26 1.49 1.59 1.98 1.91 2.05 1.93   Notes   melatonin    keflex 5/9, abx / steroid inj 5/16          Date 11/21 12/19 1/24/20 2/20 3/13 4/30 5/28 6/19 7/16 9/3 11/4 11/27   Total WeeklyDose 40mg 40mg 40mg 40mg 40mg 40mg 40mg 40mg 40mg 40mg 40mg 40mg   INR 1.94 1.99 1.96 2.16 1.86 2.62 2.68 1.21 1.78 1.71 1.46 1.93   Notes  amox x7d     decr GLV? incr GLV   overdue overdue; rcvd 11/30     Date 1/14/21 2/19 4/7            Total WeeklyDose 40mg 40mg 40mg            INR 1.97 2.26 2.11             Notes overdue; rec'd 1/15                Phone Interview:  Tablet Strength: 2.5mg and 5mg tablets  Patient Contact Info: [phone]   Lab Contact Info: [facility]    Patient Findings  Negatives:  Signs/symptoms of thrombosis, Signs/symptoms of bleeding, Laboratory test error suspected, Change in health, Change in alcohol use, Change in activity, Upcoming invasive procedure, Emergency department visit, Upcoming dental procedure, Missed doses, Extra doses, Change in medications, Change in diet/appetite, Hospital admission, Bruising, Other complaints     Plan:   1. INR is therapeutic today at 2.11 (goal 1.5-2.5). Instructed Ms. Queen to continue warfarin 5mg oral daily except for 7.5mg on SunThurs.  2. Recommend repeat INR in 5 weeks, 5/12  3. Verbal information provided over the phone. Deisi Queen RBV dosing instructions, expresses understanding by teach back, and has no further questions at this time.    Genaro Rodriguez CPhT  4/7/2021  13:58 EDT      I, Paulette Gonzalez, PharmD, have reviewed the note in full and agree with the assessment and plan.  04/07/21  14:49 EDT

## 2021-05-13 ENCOUNTER — ANTICOAGULATION VISIT (OUTPATIENT)
Dept: PHARMACY | Facility: HOSPITAL | Age: 36
End: 2021-05-13

## 2021-05-13 ENCOUNTER — LAB (OUTPATIENT)
Dept: LAB | Facility: HOSPITAL | Age: 36
End: 2021-05-13

## 2021-05-13 DIAGNOSIS — Z95.2 HX OF AORTIC VALVE REPLACEMENT, MECHANICAL: ICD-10-CM

## 2021-05-13 DIAGNOSIS — Z95.2 HX OF AORTIC VALVE REPLACEMENT, MECHANICAL: Primary | ICD-10-CM

## 2021-05-13 LAB
INR PPP: 1.47 (ref 0.9–1.1)
PROTHROMBIN TIME: 17.7 SECONDS (ref 11.9–14.1)

## 2021-05-13 PROCEDURE — 36415 COLL VENOUS BLD VENIPUNCTURE: CPT

## 2021-05-13 PROCEDURE — 85610 PROTHROMBIN TIME: CPT

## 2021-05-13 NOTE — PROGRESS NOTES
Anticoagulation Clinic - Remote Progress Note  REMOTE LAB    Indication: mechanical AVR (On-X)  Referring Provider: Latoya (Last seen: 11/19/18  next appt: 11/25/19)  Goal INR: 1.5-2.5  Current Drug Interactions: aspirin; melatonin    Diet: brussel sprouts or equivalent 1x/week 5/13/2021  Alcohol: none  Tobacco: none  OTC Pain Medication: APAP PRN    INR History:  Date 8/16 8/25 9/6 9/15 10/2 10/16 10/30 11/20 12/8 12/26 1/9/18 1/22   Total WeeklyDose 57.5mg 57.5mg 52.5mg 37.5mg  - 45mg 52.5mg 52.5mg 52.5mg 45mg 55mg 55mg 52.5mg 52.5mg   INR 2.76 2.77 2.70 1.7 1.55 2.04 2.93 1.42 2.55 2.72 2.3 2.35   Notes   decr GLV 2x miss?  Thrive Thrive 1x miss ill; dex / roceph IM inj. flu; tamiflu       Date 2/6 2/22 3/13 4/5 4/16 4/24 4/27 5/2 5/18 6/8 7/9 8/2   Total WeeklyDose 52.5mg 52.5mg 45mg 50mg 47.5mg 45mg 45mg 45mg 45mg 45mg 45mg 45mg   INR 2.68 2.3 2.55 2.72 2.57 2.34 2.37 2.03 2.05 2.16 1.83 2.84   Notes   1x miss  2x miss ill; dex / roceph IM inj.  zpak         Date 8/16 8/30 10/1 10/8 10/12 10/19 10/26 11/9 11/30 12/14 12/21 1/4/19   Total WeeklyDose 45mg 45mg 45mg 42.5mg 40mg 45mg 50mg 47.5mg 47.5mg 47.5mg 40mg 42.5mg   INR 2.26 2.38 3.03 3.35 1.96 1.56 2.18 1.8 3.13 3.0 2.13 1.92   Notes    1x decr dose incr GLV    decr GLV 1x decr dose 2x decr dose      Date 1/25 2/18 3/18 4/1 4/12 4/23 5/16 6/5 7/9 8/2 9/19 10/29   Total WeeklyDose 42.5mg 45mg 45mg 42.5mg 40mg 40mg 40mg 40mg 40mg 40mg 40mg 40mg   INR 1.36 2.52 2.74 3.01 1.91 2.26 1.49 1.59 1.98 1.91 2.05 1.93   Notes   melatonin    keflex 5/9, abx / steroid inj 5/16          Date 11/21 12/19 1/24/20 2/20 3/13 4/30 5/28 6/19 7/16 9/3 11/4 11/27   Total WeeklyDose 40mg 40mg 40mg 40mg 40mg 40mg 40mg 40mg 40mg 40mg 40mg 40mg   INR 1.94 1.99 1.96 2.16 1.86 2.62 2.68 1.21 1.78 1.71 1.46 1.93   Notes  amox x7d     decr GLV? incr GLV   overdue overdue; rcvd 11/30     Date 1/14/21 2/19 4/7 5/13           Total WeeklyDose 40mg 40mg 40mg 40mg           INR 1.97 2.26  2.11 1.47           Notes overdue; rec'd 1/15                Phone Interview:  Tablet Strength: 2.5mg and 5mg tablets  Patient Contact Info: [phone]   Lab Contact Info: [facility]    Patient Findings  Negatives:  Signs/symptoms of thrombosis, Signs/symptoms of bleeding, Laboratory test error suspected, Change in health, Change in alcohol use, Change in activity, Upcoming invasive procedure, Emergency department visit, Upcoming dental procedure, Missed doses, Extra doses, Change in medications, Change in diet/appetite, Hospital admission, Bruising, Other complaints   Comments:  Ms. Queen reports her GLV are consistent. Patient reports she has had a decrease in weight ~20lb since February by decreasing caloric intake.     Plan:   1. INR is slighty SUBtherapeutic today at 1.47 (goal 1.5-2.5). Given patient is due for a higher dose of warfarin tonight instructed Ms. Queen to continue warfarin 5mg oral daily except for 7.5mg on SunThurs and avoid GLV over the next two days.   2. Recommend repeat INR in 2 weeks, 5/27 to ensure remains WNL.  3. Verbal information provided over the phone. Deisi Queen RBV dosing instructions, expresses understanding by teach back, and has no further questions at this time.    Carmelita Morales, PharmD  5/13/2021  11:23 EDT

## 2021-05-28 ENCOUNTER — LAB (OUTPATIENT)
Dept: LAB | Facility: HOSPITAL | Age: 36
End: 2021-05-28

## 2021-05-28 ENCOUNTER — ANTICOAGULATION VISIT (OUTPATIENT)
Dept: PHARMACY | Facility: HOSPITAL | Age: 36
End: 2021-05-28

## 2021-05-28 DIAGNOSIS — Z95.2 HX OF AORTIC VALVE REPLACEMENT, MECHANICAL: Primary | ICD-10-CM

## 2021-05-28 DIAGNOSIS — Z95.2 HX OF AORTIC VALVE REPLACEMENT, MECHANICAL: ICD-10-CM

## 2021-05-28 LAB
INR PPP: 1.56 (ref 0.9–1.1)
PROTHROMBIN TIME: 18.5 SECONDS (ref 11.9–14.1)

## 2021-05-28 PROCEDURE — 36415 COLL VENOUS BLD VENIPUNCTURE: CPT

## 2021-05-28 PROCEDURE — 85610 PROTHROMBIN TIME: CPT

## 2021-05-28 NOTE — PROGRESS NOTES
Anticoagulation Clinic - Remote Progress Note  REMOTE LAB    Indication: mechanical AVR (On-X)  Referring Provider: Latoya (Last seen: 11/19/18  next appt: 11/25/19)  Goal INR: 1.5-2.5  Current Drug Interactions: aspirin; melatonin    Diet: brussel sprouts or equivalent 1x/week 5/13/2021  Alcohol: none  Tobacco: none  OTC Pain Medication: APAP PRN    INR History:  Date 8/16 8/25 9/6 9/15 10/2 10/16 10/30 11/20 12/8 12/26 1/9/18 1/22   Total WeeklyDose 57.5mg 57.5mg 52.5mg 37.5mg  - 45mg 52.5mg 52.5mg 52.5mg 45mg 55mg 55mg 52.5mg 52.5mg   INR 2.76 2.77 2.70 1.7 1.55 2.04 2.93 1.42 2.55 2.72 2.3 2.35   Notes   decr GLV 2x miss?  Thrive Thrive 1x miss ill; dex / roceph IM inj. flu; tamiflu       Date 2/6 2/22 3/13 4/5 4/16 4/24 4/27 5/2 5/18 6/8 7/9 8/2   Total WeeklyDose 52.5mg 52.5mg 45mg 50mg 47.5mg 45mg 45mg 45mg 45mg 45mg 45mg 45mg   INR 2.68 2.3 2.55 2.72 2.57 2.34 2.37 2.03 2.05 2.16 1.83 2.84   Notes   1x miss  2x miss ill; dex / roceph IM inj.  zpak         Date 8/16 8/30 10/1 10/8 10/12 10/19 10/26 11/9 11/30 12/14 12/21 1/4/19   Total WeeklyDose 45mg 45mg 45mg 42.5mg 40mg 45mg 50mg 47.5mg 47.5mg 47.5mg 40mg 42.5mg   INR 2.26 2.38 3.03 3.35 1.96 1.56 2.18 1.8 3.13 3.0 2.13 1.92   Notes    1x decr dose incr GLV    decr GLV 1x decr dose 2x decr dose      Date 1/25 2/18 3/18 4/1 4/12 4/23 5/16 6/5 7/9 8/2 9/19 10/29   Total WeeklyDose 42.5mg 45mg 45mg 42.5mg 40mg 40mg 40mg 40mg 40mg 40mg 40mg 40mg   INR 1.36 2.52 2.74 3.01 1.91 2.26 1.49 1.59 1.98 1.91 2.05 1.93   Notes   melatonin    keflex 5/9, abx / steroid inj 5/16          Date 11/21 12/19 1/24/20 2/20 3/13 4/30 5/28 6/19 7/16 9/3 11/4 11/27   Total WeeklyDose 40mg 40mg 40mg 40mg 40mg 40mg 40mg 40mg 40mg 40mg 40mg 40mg   INR 1.94 1.99 1.96 2.16 1.86 2.62 2.68 1.21 1.78 1.71 1.46 1.93   Notes  amox x7d     decr GLV? incr GLV   overdue overdue; rcvd 11/30     Date 1/14/21 2/19 4/7 5/13 5/28          Total WeeklyDose 40mg 40mg 40mg 40mg 40mg          INR 1.97  2.26 2.11 1.47 1.56          Notes overdue; rec'd 1/15                Phone Interview:  Tablet Strength: 2.5mg and 5mg tablets  Patient Contact Info: [phone]   Lab Contact Info: [facility]    Patient Findings    Negatives:  Signs/symptoms of thrombosis, Signs/symptoms of bleeding, Laboratory test error suspected, Change in health, Change in alcohol use, Change in activity, Upcoming invasive procedure, Emergency department visit, Upcoming dental procedure, Missed doses, Extra doses, Change in medications, Change in diet/appetite, Hospital admission, Bruising, Other complaints         Plan:   1. INR is therapeutic today at 1.56(goal 1.5-2.5). Given patient is due for a higher dose of warfarin kwaku instructed Ms. Queen to continue warfarin 5mg oral daily except for 7.5mg on SunThurs   2. Recommend repeat INR in 2 weeks on 6/10to ensure WNL. Could consider pushing out to monthly if therapeutic at that time.   3. Verbal information provided over the phone. Deisi Queen RBV dosing instructions, expresses understanding by teach back, and has no further questions at this time.    Thank you,    Jayden Cuba PharmD, NENA  5/28/2021  10:26 EDT

## 2021-06-09 ENCOUNTER — ANTICOAGULATION VISIT (OUTPATIENT)
Dept: PHARMACY | Facility: HOSPITAL | Age: 36
End: 2021-06-09

## 2021-06-09 ENCOUNTER — LAB (OUTPATIENT)
Dept: LAB | Facility: HOSPITAL | Age: 36
End: 2021-06-09

## 2021-06-09 DIAGNOSIS — Z95.2 HX OF AORTIC VALVE REPLACEMENT, MECHANICAL: Primary | ICD-10-CM

## 2021-06-09 DIAGNOSIS — Z95.2 HX OF AORTIC VALVE REPLACEMENT, MECHANICAL: ICD-10-CM

## 2021-06-09 LAB
INR PPP: 2.19 (ref 0.9–1.1)
PROTHROMBIN TIME: 24.7 SECONDS (ref 12.8–14.5)

## 2021-06-09 PROCEDURE — 85610 PROTHROMBIN TIME: CPT

## 2021-06-09 PROCEDURE — 36415 COLL VENOUS BLD VENIPUNCTURE: CPT

## 2021-06-09 NOTE — PROGRESS NOTES
Anticoagulation Clinic - Remote Progress Note  REMOTE LAB    Indication: mechanical AVR (On-X)  Referring Provider: Latoya (Last seen: 11/19/18  next appt: 11/25/19)  Goal INR: 1.5-2.5  Current Drug Interactions: aspirin; melatonin    Diet: brussel sprouts or equivalent 1x/week 5/13/2021  Alcohol: none  Tobacco: none  OTC Pain Medication: APAP PRN    INR History:  Date 8/16 8/25 9/6 9/15 10/2 10/16 10/30 11/20 12/8 12/26 1/9/18 1/22   Total WeeklyDose 57.5mg 57.5mg 52.5mg 37.5mg  - 45mg 52.5mg 52.5mg 52.5mg 45mg 55mg 55mg 52.5mg 52.5mg   INR 2.76 2.77 2.70 1.7 1.55 2.04 2.93 1.42 2.55 2.72 2.3 2.35   Notes   decr GLV 2x miss?  Thrive Thrive 1x miss ill; dex / roceph IM inj. flu; tamiflu       Date 2/6 2/22 3/13 4/5 4/16 4/24 4/27 5/2 5/18 6/8 7/9 8/2   Total WeeklyDose 52.5mg 52.5mg 45mg 50mg 47.5mg 45mg 45mg 45mg 45mg 45mg 45mg 45mg   INR 2.68 2.3 2.55 2.72 2.57 2.34 2.37 2.03 2.05 2.16 1.83 2.84   Notes   1x miss  2x miss ill; dex / roceph IM inj.  zpak         Date 8/16 8/30 10/1 10/8 10/12 10/19 10/26 11/9 11/30 12/14 12/21 1/4/19   Total WeeklyDose 45mg 45mg 45mg 42.5mg 40mg 45mg 50mg 47.5mg 47.5mg 47.5mg 40mg 42.5mg   INR 2.26 2.38 3.03 3.35 1.96 1.56 2.18 1.8 3.13 3.0 2.13 1.92   Notes    1x decr dose incr GLV    decr GLV 1x decr dose 2x decr dose      Date 1/25 2/18 3/18 4/1 4/12 4/23 5/16 6/5 7/9 8/2 9/19 10/29   Total WeeklyDose 42.5mg 45mg 45mg 42.5mg 40mg 40mg 40mg 40mg 40mg 40mg 40mg 40mg   INR 1.36 2.52 2.74 3.01 1.91 2.26 1.49 1.59 1.98 1.91 2.05 1.93   Notes   melatonin    keflex 5/9, abx / steroid inj 5/16          Date 11/21 12/19 1/24/20 2/20 3/13 4/30 5/28 6/19 7/16 9/3 11/4 11/27   Total WeeklyDose 40mg 40mg 40mg 40mg 40mg 40mg 40mg 40mg 40mg 40mg 40mg 40mg   INR 1.94 1.99 1.96 2.16 1.86 2.62 2.68 1.21 1.78 1.71 1.46 1.93   Notes  amox x7d     decr GLV? incr GLV   overdue overdue; rcvd 11/30     Date 1/14/21 2/19 4/7 5/13 5/28 6/9         Total WeeklyDose 40mg 40mg 40mg 40mg 40mg 40mg          INR 1.97 2.26 2.11 1.47 1.56 2.19         Notes overdue; rec'd 1/15                Phone Interview:  Tablet Strength: 2.5mg and 5mg tablets  Patient Contact Info: [phone]   Lab Contact Info: [facility]    Patient Findings  Negatives:  Signs/symptoms of thrombosis, Signs/symptoms of bleeding, Laboratory test error suspected, Change in health, Change in alcohol use, Change in activity, Upcoming invasive procedure, Emergency department visit, Upcoming dental procedure, Missed doses, Extra doses, Change in medications, Change in diet/appetite, Hospital admission, Bruising, Other complaints     Plan:   1. INR is therapeutic today at 2.19 (goal 1.5-2.5). Instructed Ms. Queen to continue warfarin 5mg oral daily except for 7.5mg on SunThurs until recheck  2. Recommend repeat INR in 4 weeks, 7/7  3. Verbal information provided over the phone. Deisi Queen RBV dosing instructions, expresses understanding by teach back, and has no further questions at this time.    Genaro Rodriguez CPhT  6/9/2021  13:20 EDT    I, Ashley Galindo, have reviewed the note in full and agree with the assessment and plan.  06/09/21  14:59 EDT

## 2021-06-17 RX ORDER — WARFARIN SODIUM 5 MG/1
TABLET ORAL
Qty: 90 TABLET | Refills: 1 | Status: SHIPPED | OUTPATIENT
Start: 2021-06-17 | End: 2021-10-01 | Stop reason: SDUPTHER

## 2021-07-14 ENCOUNTER — LAB (OUTPATIENT)
Dept: LAB | Facility: HOSPITAL | Age: 36
End: 2021-07-14

## 2021-07-14 ENCOUNTER — ANTICOAGULATION VISIT (OUTPATIENT)
Dept: PHARMACY | Facility: HOSPITAL | Age: 36
End: 2021-07-14

## 2021-07-14 DIAGNOSIS — Z95.2 HX OF AORTIC VALVE REPLACEMENT, MECHANICAL: ICD-10-CM

## 2021-07-14 DIAGNOSIS — Z95.2 HX OF AORTIC VALVE REPLACEMENT, MECHANICAL: Primary | ICD-10-CM

## 2021-07-14 LAB
INR PPP: 1.49 (ref 0.9–1.1)
PROTHROMBIN TIME: 18.4 SECONDS (ref 12.8–14.5)

## 2021-07-14 PROCEDURE — 36415 COLL VENOUS BLD VENIPUNCTURE: CPT

## 2021-07-14 PROCEDURE — 85610 PROTHROMBIN TIME: CPT

## 2021-07-14 NOTE — PROGRESS NOTES
Anticoagulation Clinic - Remote Progress Note  REMOTE LAB    Indication: mechanical AVR (On-X)  Referring Provider: Latoya (Last seen: 11/19/18  next appt: 11/25/19)  Goal INR: 1.5-2.5  Current Drug Interactions: aspirin; melatonin    Diet: brussel sprouts or equivalent 1x/week 5/13/2021  Alcohol: none  Tobacco: none  OTC Pain Medication: APAP PRN    INR History:  Date 8/16 8/25 9/6 9/15 10/2 10/16 10/30 11/20 12/8 12/26 1/9/18 1/22   Total WeeklyDose 57.5mg 57.5mg 52.5mg 37.5mg  - 45mg 52.5mg 52.5mg 52.5mg 45mg 55mg 55mg 52.5mg 52.5mg   INR 2.76 2.77 2.70 1.7 1.55 2.04 2.93 1.42 2.55 2.72 2.3 2.35   Notes   decr GLV 2x miss?  Thrive Thrive 1x miss ill; dex / roceph IM inj. flu; tamiflu       Date 2/6 2/22 3/13 4/5 4/16 4/24 4/27 5/2 5/18 6/8 7/9 8/2   Total WeeklyDose 52.5mg 52.5mg 45mg 50mg 47.5mg 45mg 45mg 45mg 45mg 45mg 45mg 45mg   INR 2.68 2.3 2.55 2.72 2.57 2.34 2.37 2.03 2.05 2.16 1.83 2.84   Notes   1x miss  2x miss ill; dex / roceph IM inj.  zpak         Date 8/16 8/30 10/1 10/8 10/12 10/19 10/26 11/9 11/30 12/14 12/21 1/4/19   Total WeeklyDose 45mg 45mg 45mg 42.5mg 40mg 45mg 50mg 47.5mg 47.5mg 47.5mg 40mg 42.5mg   INR 2.26 2.38 3.03 3.35 1.96 1.56 2.18 1.8 3.13 3.0 2.13 1.92   Notes    1x decr dose incr GLV    decr GLV 1x decr dose 2x decr dose      Date 1/25 2/18 3/18 4/1 4/12 4/23 5/16 6/5 7/9 8/2 9/19 10/29   Total WeeklyDose 42.5mg 45mg 45mg 42.5mg 40mg 40mg 40mg 40mg 40mg 40mg 40mg 40mg   INR 1.36 2.52 2.74 3.01 1.91 2.26 1.49 1.59 1.98 1.91 2.05 1.93   Notes   melatonin    keflex 5/9, abx / steroid inj 5/16          Date 11/21 12/19 1/24/20 2/20 3/13 4/30 5/28 6/19 7/16 9/3 11/4 11/27   Total WeeklyDose 40mg 40mg 40mg 40mg 40mg 40mg 40mg 40mg 40mg 40mg 40mg 40mg   INR 1.94 1.99 1.96 2.16 1.86 2.62 2.68 1.21 1.78 1.71 1.46 1.93   Notes  amox x7d     decr GLV? incr GLV   overdue overdue; rcvd 11/30     Date 1/14/21 2/19 4/7 5/13 5/28 6/9 7/14        Total WeeklyDose 40mg 40mg 40mg 40mg 40mg 40mg 40mg         INR 1.97 2.26 2.11 1.47 1.56 2.19 1.49        Notes overdue; rec'd 1/15                Phone Interview:  Tablet Strength: 2.5mg and 5mg tablets  Patient Contact Info: [phone]   Lab Contact Info: [facility]    Patient Findings  Negatives:  Signs/symptoms of thrombosis, Signs/symptoms of bleeding, Laboratory test error suspected, Change in health, Change in alcohol use, Change in activity, Upcoming invasive procedure, Emergency department visit, Upcoming dental procedure, Missed doses, Extra doses, Change in medications, Change in diet/appetite, Hospital admission, Bruising, Other complaints     Plan:   1. INR is slightly SUBtherapeutic today at 1.49 (goal 1.5-2.5). Instructed Ms. Queen to be consistent with GLV intake and then continue warfarin 5mg oral daily except for 7.5mg on SunThurs until recheck  2. Recommend repeat INR in 4 weeks, 8/11  3. Verbal information provided over the phone. Deisi Queen RBV dosing instructions, expresses understanding by teach back, and has no further questions at this time.      Genaro Rodriguez, [place]  7/14/2021  14:54 EDT     I, Jayden Cuba, PharmD, have reviewed the note in full and agree with the assessment and plan.  07/14/21  16:17 EDT

## 2021-08-30 ENCOUNTER — LAB (OUTPATIENT)
Dept: LAB | Facility: HOSPITAL | Age: 36
End: 2021-08-30

## 2021-08-30 ENCOUNTER — ANTICOAGULATION VISIT (OUTPATIENT)
Dept: PHARMACY | Facility: HOSPITAL | Age: 36
End: 2021-08-30

## 2021-08-30 DIAGNOSIS — Z95.2 HX OF AORTIC VALVE REPLACEMENT, MECHANICAL: Primary | ICD-10-CM

## 2021-08-30 DIAGNOSIS — Z95.2 HX OF AORTIC VALVE REPLACEMENT, MECHANICAL: ICD-10-CM

## 2021-08-30 LAB
INR PPP: 1.34 (ref 0.9–1.1)
PROTHROMBIN TIME: 17 SECONDS (ref 12.8–14.5)

## 2021-08-30 PROCEDURE — 36415 COLL VENOUS BLD VENIPUNCTURE: CPT

## 2021-08-30 PROCEDURE — 85610 PROTHROMBIN TIME: CPT

## 2021-08-30 NOTE — PROGRESS NOTES
Anticoagulation Clinic - Remote Progress Note  REMOTE LAB    Indication: mechanical AVR (On-X)  Referring Provider: Latoya (Last seen: 11/19/18  next appt: 11/25/19)  Goal INR: 1.5-2.5  Current Drug Interactions: aspirin; melatonin    Diet: brussel sprouts or equivalent 1x/week 5/13/2021  Alcohol: none  Tobacco: none  OTC Pain Medication: APAP PRN    INR History:  Date 8/16 8/25 9/6 9/15 10/2 10/16 10/30 11/20 12/8 12/26 1/9/18 1/22   Total WeeklyDose 57.5mg 57.5mg 52.5mg 37.5mg  - 45mg 52.5mg 52.5mg 52.5mg 45mg 55mg 55mg 52.5mg 52.5mg   INR 2.76 2.77 2.70 1.7 1.55 2.04 2.93 1.42 2.55 2.72 2.3 2.35   Notes   decr GLV 2x miss?  Thrive Thrive 1x miss ill; dex / roceph IM inj. flu; tamiflu       Date 2/6 2/22 3/13 4/5 4/16 4/24 4/27 5/2 5/18 6/8 7/9 8/2   Total WeeklyDose 52.5mg 52.5mg 45mg 50mg 47.5mg 45mg 45mg 45mg 45mg 45mg 45mg 45mg   INR 2.68 2.3 2.55 2.72 2.57 2.34 2.37 2.03 2.05 2.16 1.83 2.84   Notes   1x miss  2x miss ill; dex / roceph IM inj.  zpak         Date 8/16 8/30 10/1 10/8 10/12 10/19 10/26 11/9 11/30 12/14 12/21 1/4/19   Total WeeklyDose 45mg 45mg 45mg 42.5mg 40mg 45mg 50mg 47.5mg 47.5mg 47.5mg 40mg 42.5mg   INR 2.26 2.38 3.03 3.35 1.96 1.56 2.18 1.8 3.13 3.0 2.13 1.92   Notes    1x decr dose incr GLV    decr GLV 1x decr dose 2x decr dose      Date 1/25 2/18 3/18 4/1 4/12 4/23 5/16 6/5 7/9 8/2 9/19 10/29   Total WeeklyDose 42.5mg 45mg 45mg 42.5mg 40mg 40mg 40mg 40mg 40mg 40mg 40mg 40mg   INR 1.36 2.52 2.74 3.01 1.91 2.26 1.49 1.59 1.98 1.91 2.05 1.93   Notes   melatonin    keflex 5/9, abx / steroid inj 5/16          Date 11/21 12/19 1/24/20 2/20 3/13 4/30 5/28 6/19 7/16 9/3 11/4 11/27   Total WeeklyDose 40mg 40mg 40mg 40mg 40mg 40mg 40mg 40mg 40mg 40mg 40mg 40mg   INR 1.94 1.99 1.96 2.16 1.86 2.62 2.68 1.21 1.78 1.71 1.46 1.93   Notes  amox x7d     decr GLV? incr GLV   overdue overdue; rcvd 11/30     Date 1/14/21 2/19 4/7 5/13 5/28 6/9 7/14 8/30       Total WeeklyDose 40mg 40mg 40mg 40mg 40mg 40mg  40mg 40mg       INR 1.97 2.26 2.11 1.47 1.56 2.19 1.49 1.34       Notes overdue; rec'd 1/15                Phone Interview:  Tablet Strength: 2.5mg and 5mg tablets  Patient Contact Info: [phone]   Lab Contact Info: [facility]        Plan:   1. INR is SUBtherapeutic today at 1.34(goal 1.5-2.5). Instructed Ms. Queen to boost today's dose to 7.5mg and continue warfarin 5mg oral daily except for 7.5mg on SunThurs until recheck  2.Repeat INR 9/13  3. Verbal information provided over the phone. Deisi Queen RBV dosing instructions, expresses understanding by teach back, and has no further questions at this time.    Thank you,    Jayden JeffriesD, NENA, BCPS  8/30/2021  15:42 EDT

## 2021-09-13 ENCOUNTER — LAB (OUTPATIENT)
Dept: LAB | Facility: HOSPITAL | Age: 36
End: 2021-09-13

## 2021-09-13 ENCOUNTER — ANTICOAGULATION VISIT (OUTPATIENT)
Dept: PHARMACY | Facility: HOSPITAL | Age: 36
End: 2021-09-13

## 2021-09-13 DIAGNOSIS — Z95.2 HX OF AORTIC VALVE REPLACEMENT, MECHANICAL: Primary | ICD-10-CM

## 2021-09-13 DIAGNOSIS — Z95.2 HX OF AORTIC VALVE REPLACEMENT, MECHANICAL: ICD-10-CM

## 2021-09-13 LAB
INR PPP: 1.46 (ref 0.9–1.1)
PROTHROMBIN TIME: 18.1 SECONDS (ref 12.8–14.5)

## 2021-09-13 PROCEDURE — 85610 PROTHROMBIN TIME: CPT

## 2021-09-13 PROCEDURE — 36415 COLL VENOUS BLD VENIPUNCTURE: CPT

## 2021-09-13 NOTE — PROGRESS NOTES
Anticoagulation Clinic - Remote Progress Note  REMOTE LAB    Indication: mechanical AVR (On-X)  Referring Provider: Latoya (Last seen: 11/19/18  next appt: 11/25/19)  Goal INR: 1.5-2.5  Current Drug Interactions: aspirin; melatonin    Diet: brussel sprouts or equivalent 1x/week 9/13/21  Alcohol: none  Tobacco: none  OTC Pain Medication: APAP PRN    INR History:  Date 8/16 8/25 9/6 9/15 10/2 10/16 10/30 11/20 12/8 12/26 1/9/18 1/22   Total WeeklyDose 57.5mg 57.5mg 52.5mg 37.5mg  - 45mg 52.5mg 52.5mg 52.5mg 45mg 55mg 55mg 52.5mg 52.5mg   INR 2.76 2.77 2.70 1.7 1.55 2.04 2.93 1.42 2.55 2.72 2.3 2.35   Notes   decr GLV 2x miss?  Thrive Thrive 1x miss ill; dex / roceph IM inj. flu; tamiflu       Date 2/6 2/22 3/13 4/5 4/16 4/24 4/27 5/2 5/18 6/8 7/9 8/2   Total WeeklyDose 52.5mg 52.5mg 45mg 50mg 47.5mg 45mg 45mg 45mg 45mg 45mg 45mg 45mg   INR 2.68 2.3 2.55 2.72 2.57 2.34 2.37 2.03 2.05 2.16 1.83 2.84   Notes   1x miss  2x miss ill; dex / roceph IM inj.  zpak         Date 8/16 8/30 10/1 10/8 10/12 10/19 10/26 11/9 11/30 12/14 12/21 1/4/19   Total WeeklyDose 45mg 45mg 45mg 42.5mg 40mg 45mg 50mg 47.5mg 47.5mg 47.5mg 40mg 42.5mg   INR 2.26 2.38 3.03 3.35 1.96 1.56 2.18 1.8 3.13 3.0 2.13 1.92   Notes    1x decr dose incr GLV    decr GLV 1x decr dose 2x decr dose      Date 1/25 2/18 3/18 4/1 4/12 4/23 5/16 6/5 7/9 8/2 9/19 10/29   Total WeeklyDose 42.5mg 45mg 45mg 42.5mg 40mg 40mg 40mg 40mg 40mg 40mg 40mg 40mg   INR 1.36 2.52 2.74 3.01 1.91 2.26 1.49 1.59 1.98 1.91 2.05 1.93   Notes   melatonin    keflex 5/9, abx / steroid inj 5/16          Date 11/21 12/19 1/24/20 2/20 3/13 4/30 5/28 6/19 7/16 9/3 11/4 11/27   Total WeeklyDose 40mg 40mg 40mg 40mg 40mg 40mg 40mg 40mg 40mg 40mg 40mg 40mg   INR 1.94 1.99 1.96 2.16 1.86 2.62 2.68 1.21 1.78 1.71 1.46 1.93   Notes  amox x7d     decr GLV? incr GLV   overdue overdue; rcvd 11/30     Date 1/14/21 2/19 4/7 5/13 5/28 6/9 7/14 8/30 9/13      Total WeeklyDose 40mg 40mg 40mg 40mg 40mg 40mg  40mg 40mg 40 mg      INR 1.97 2.26 2.11 1.47 1.56 2.19 1.49 1.34 1.46      Notes overdue; rec'd 1/15                Phone Interview:  Tablet Strength: 2.5mg and 5mg tablets  Patient Contact Info: [phone]   Lab Contact Info: [facility]    Patient Findings:  Negatives:  Signs/symptoms of thrombosis, Signs/symptoms of bleeding, Laboratory test error suspected, Change in health, Change in alcohol use, Change in activity, Upcoming invasive procedure, Emergency department visit, Upcoming dental procedure, Missed doses, Extra doses, Change in medications, Change in diet/appetite, Hospital admission, Bruising, Other complaints     Plan:   1. INR is sub therapeutic again today yet improved. At this time, instructed Ms. Queen to take 7.5 mg tonight, 5mg tomorrow then thereafter begin new maintenance dose of warfarin 5 mg daily except 7.5 mg on SunTuesThurs until recheck.  2.Repeat INR in two weeks, 9/27.  3. Verbal information provided over the phone. Deisi Queen RBV dosing instructions, expresses understanding by teach back, and has no further questions at this time.    Delphine You CPhT  9/13/2021  15:33 EDT       Third subtherapeutic INR, agree with increase in maintenance dose at this time  IPaulette, PharmD, have reviewed the note in full and agree with the assessment and plan.  09/13/21  15:44 EDT

## 2021-09-20 ENCOUNTER — ANTICOAGULATION VISIT (OUTPATIENT)
Dept: PHARMACY | Facility: HOSPITAL | Age: 36
End: 2021-09-20

## 2021-09-20 ENCOUNTER — LAB (OUTPATIENT)
Dept: LAB | Facility: HOSPITAL | Age: 36
End: 2021-09-20

## 2021-09-20 DIAGNOSIS — Z95.2 HX OF AORTIC VALVE REPLACEMENT, MECHANICAL: Primary | ICD-10-CM

## 2021-09-20 DIAGNOSIS — Z95.2 HX OF AORTIC VALVE REPLACEMENT, MECHANICAL: ICD-10-CM

## 2021-09-20 LAB
INR PPP: 1.39 (ref 0.9–1.1)
PROTHROMBIN TIME: 17.4 SECONDS (ref 12.8–14.5)

## 2021-09-20 PROCEDURE — 85610 PROTHROMBIN TIME: CPT

## 2021-09-20 PROCEDURE — 36415 COLL VENOUS BLD VENIPUNCTURE: CPT

## 2021-09-20 NOTE — PROGRESS NOTES
Anticoagulation Clinic - Remote Progress Note  REMOTE LAB    Indication: mechanical AVR (On-X)  Referring Provider: Latoya (Last seen: 11/19/18  next appt: 11/25/19)  Goal INR: 1.5-2.5  Current Drug Interactions: aspirin; melatonin    Diet: brussel sprouts or equivalent 1x/week 9/13/21  Alcohol: none  Tobacco: none  OTC Pain Medication: APAP PRN    INR History:  Date 11/21 12/19 1/24/20 2/20 3/13 4/30 5/28 6/19 7/16 9/3 11/4 11/27   Total WeeklyDose 40mg 40mg 40mg 40mg 40mg 40mg 40mg 40mg 40mg 40mg 40mg 40mg   INR 1.94 1.99 1.96 2.16 1.86 2.62 2.68 1.21 1.78 1.71 1.46 1.93   Notes  amox x7d     decr GLV? incr GLV   overdue overdue; rcvd 11/30     Date 1/14/21 2/19 4/7 5/13 5/28 6/9 7/14 8/30 9/13 9/20     Total WeeklyDose 40mg 40mg 40mg 40mg 40mg 40mg 40mg 40mg 40 mg 42.5 mg 45 mg    INR 1.97 2.26 2.11 1.47 1.56 2.19 1.49 1.34 1.46 1.39     Notes overdue; rec'd 1/15                Phone Interview:  Tablet Strength: 2.5 mg and 5 mg tablets  Patient Contact Info: [phone]   Lab Contact Info: [facility]    Patient Findings  Positives:  Change in health   Negatives:  Signs/symptoms of bleeding, Change in activity, Upcoming invasive procedure, Emergency department visit, Upcoming dental procedure, Missed doses, Extra doses, Change in medications, Change in diet/appetite, Hospital admission, Bruising, Other complaints   Comments:  Patient stated that she has been sick within the last 4 days with her head hurting and she has been dizzy. Patient stated that even though she was sick she still has a great appetite, very similar to before she was sick.      Plan:     1. INR is SUBtherapeutic today at 1.39 (goal INR: 1.5-2.5). Instructed Ms. Queen to take warfarin 7.5 mg daily except warfarin 5 mg on Tues, Thurs, & Sat until recheck (45 mg/week).    2.Repeat INR in ~1 week, 9/27, to ensure WNL. Patient was worried that the dose increase may make her blood too thin. Discussed with patient that with her INR being 1.39,  there is room for her INR to increase and still be within range between 1.5-2.5. Patient verbalized understanding.   3. Verbal information provided over the phone. Deisi MIN Alcalay RBV dosing instructions, expresses understanding by teach back, and has no further questions at this time.    Thank you,     Cata Orta, PharmD  Pharmacy Resident   9/20/2021  14:21 EDT

## 2021-10-01 ENCOUNTER — ANTICOAGULATION VISIT (OUTPATIENT)
Dept: PHARMACY | Facility: HOSPITAL | Age: 36
End: 2021-10-01

## 2021-10-01 ENCOUNTER — LAB (OUTPATIENT)
Dept: LAB | Facility: HOSPITAL | Age: 36
End: 2021-10-01

## 2021-10-01 ENCOUNTER — TRANSCRIBE ORDERS (OUTPATIENT)
Dept: ADMINISTRATIVE | Facility: HOSPITAL | Age: 36
End: 2021-10-01

## 2021-10-01 DIAGNOSIS — I35.9 AORTIC VALVE DISORDER: ICD-10-CM

## 2021-10-01 DIAGNOSIS — Z95.2 HX OF AORTIC VALVE REPLACEMENT, MECHANICAL: Primary | ICD-10-CM

## 2021-10-01 DIAGNOSIS — E55.9 VITAMIN D DEFICIENCY: ICD-10-CM

## 2021-10-01 DIAGNOSIS — Z95.2 HX OF AORTIC VALVE REPLACEMENT, MECHANICAL: ICD-10-CM

## 2021-10-01 DIAGNOSIS — E78.2 MIXED HYPERLIPIDEMIA: ICD-10-CM

## 2021-10-01 DIAGNOSIS — E78.2 MIXED HYPERLIPIDEMIA: Primary | ICD-10-CM

## 2021-10-01 LAB
ALBUMIN SERPL-MCNC: 4.66 G/DL (ref 3.5–5.2)
ALBUMIN/GLOB SERPL: 1.8 G/DL
ALP SERPL-CCNC: 74 U/L (ref 39–117)
ALT SERPL W P-5'-P-CCNC: 9 U/L (ref 1–33)
ANION GAP SERPL CALCULATED.3IONS-SCNC: 8.7 MMOL/L (ref 5–15)
AST SERPL-CCNC: 14 U/L (ref 1–32)
BASOPHILS # BLD MANUAL: 0.08 10*3/MM3 (ref 0–0.2)
BASOPHILS NFR BLD AUTO: 1 % (ref 0–1.5)
BILIRUB SERPL-MCNC: 0.3 MG/DL (ref 0–1.2)
BUN SERPL-MCNC: 12 MG/DL (ref 6–20)
BUN/CREAT SERPL: 15.2 (ref 7–25)
CALCIUM SPEC-SCNC: 9.2 MG/DL (ref 8.6–10.5)
CHLORIDE SERPL-SCNC: 104 MMOL/L (ref 98–107)
CHOLEST SERPL-MCNC: 164 MG/DL (ref 0–200)
CO2 SERPL-SCNC: 24.3 MMOL/L (ref 22–29)
CREAT SERPL-MCNC: 0.79 MG/DL (ref 0.57–1)
DEPRECATED RDW RBC AUTO: 41.4 FL (ref 37–54)
EOSINOPHIL # BLD MANUAL: 0.15 10*3/MM3 (ref 0–0.4)
EOSINOPHIL NFR BLD MANUAL: 2 % (ref 0.3–6.2)
ERYTHROCYTE [DISTWIDTH] IN BLOOD BY AUTOMATED COUNT: 13.2 % (ref 12.3–15.4)
GFR SERPL CREATININE-BSD FRML MDRD: 82 ML/MIN/1.73
GLOBULIN UR ELPH-MCNC: 2.6 GM/DL
GLUCOSE SERPL-MCNC: 85 MG/DL (ref 65–99)
HCT VFR BLD AUTO: 41.1 % (ref 34–46.6)
HDLC SERPL-MCNC: 48 MG/DL (ref 40–60)
HGB BLD-MCNC: 13.5 G/DL (ref 12–15.9)
INR PPP: 1.19 (ref 0.9–1.1)
LDLC SERPL CALC-MCNC: 88 MG/DL (ref 0–100)
LDLC/HDLC SERPL: 1.74 {RATIO}
LYMPHOCYTES # BLD MANUAL: 2.09 10*3/MM3 (ref 0.7–3.1)
LYMPHOCYTES NFR BLD MANUAL: 27 % (ref 19.6–45.3)
LYMPHOCYTES NFR BLD MANUAL: 3 % (ref 5–12)
MCH RBC QN AUTO: 28.8 PG (ref 26.6–33)
MCHC RBC AUTO-ENTMCNC: 32.8 G/DL (ref 31.5–35.7)
MCV RBC AUTO: 87.6 FL (ref 79–97)
MONOCYTES # BLD AUTO: 0.23 10*3/MM3 (ref 0.1–0.9)
NEUTROPHILS # BLD AUTO: 5.19 10*3/MM3 (ref 1.7–7)
NEUTROPHILS NFR BLD MANUAL: 62 % (ref 42.7–76)
NEUTS BAND NFR BLD MANUAL: 5 % (ref 0–5)
PLAT MORPH BLD: NORMAL
PLATELET # BLD AUTO: 198 10*3/MM3 (ref 140–450)
PMV BLD AUTO: 9.8 FL (ref 6–12)
POTASSIUM SERPL-SCNC: 4.1 MMOL/L (ref 3.5–5.2)
PROT SERPL-MCNC: 7.3 G/DL (ref 6–8.5)
PROTHROMBIN TIME: 15.5 SECONDS (ref 12.8–14.5)
RBC # BLD AUTO: 4.69 10*6/MM3 (ref 3.77–5.28)
RBC MORPH BLD: NORMAL
SODIUM SERPL-SCNC: 137 MMOL/L (ref 136–145)
TRIGL SERPL-MCNC: 162 MG/DL (ref 0–150)
VLDLC SERPL-MCNC: 28 MG/DL (ref 5–40)
WBC # BLD AUTO: 7.74 10*3/MM3 (ref 3.4–10.8)

## 2021-10-01 PROCEDURE — 85027 COMPLETE CBC AUTOMATED: CPT

## 2021-10-01 PROCEDURE — 80061 LIPID PANEL: CPT

## 2021-10-01 PROCEDURE — 85007 BL SMEAR W/DIFF WBC COUNT: CPT

## 2021-10-01 PROCEDURE — 80053 COMPREHEN METABOLIC PANEL: CPT

## 2021-10-01 PROCEDURE — 82306 VITAMIN D 25 HYDROXY: CPT

## 2021-10-01 PROCEDURE — 36415 COLL VENOUS BLD VENIPUNCTURE: CPT

## 2021-10-01 PROCEDURE — 85610 PROTHROMBIN TIME: CPT

## 2021-10-01 RX ORDER — WARFARIN SODIUM 2.5 MG/1
TABLET ORAL
Qty: 180 TABLET | Refills: 1 | Status: SHIPPED | OUTPATIENT
Start: 2021-10-01 | End: 2022-03-30 | Stop reason: SDUPTHER

## 2021-10-01 RX ORDER — WARFARIN SODIUM 5 MG/1
TABLET ORAL
Qty: 90 TABLET | Refills: 1 | Status: SHIPPED | OUTPATIENT
Start: 2021-10-01 | End: 2022-03-30 | Stop reason: SDUPTHER

## 2021-10-01 NOTE — PROGRESS NOTES
Anticoagulation Clinic - Remote Progress Note  REMOTE LAB    Indication: mechanical AVR (On-X)  Referring Provider: Latoya (Last seen: 20 next appt: 2021)  Goal INR: 1.5-2.5  Current Drug Interactions: aspirin; melatonin    Diet: brussel sprouts or equivalent 1x/week 21  Alcohol: none  Tobacco: none  OTC Pain Medication: APAP PRN    INR History:  Date 11/21 12/19 1/24/20 2/20 3/13 4/30 5/28 6/19 7/16 9/3 11/4 11/27   Total WeeklyDose 40mg 40mg 40mg 40mg 40mg 40mg 40mg 40mg 40mg 40mg 40mg 40mg   INR 1.94 1.99 1.96 2.16 1.86 2.62 2.68 1.21 1.78 1.71 1.46 1.93   Notes  amox x7d     decr GLV? incr GLV   overdue overdue; rcvd      Date 1/14/21 2/19 4/7 5/13 5/28 6/9 7/14 8/30 9/13 9/20 10/1    Total WeeklyDose 40mg 40mg 40mg 40mg 40mg 40mg 40mg 40mg 40 mg 42.5 mg 37.5mg    INR 1.97 2.26 2.11 1.47 1.56 2.19 1.49 1.34 1.46 1.39 1.19    Notes overdue; rec'd 1/15          1x miss      Phone Interview:  Tablet Strength: 2.5 mg and 5 mg tablets  Patient Contact Info: [phone]   Lab Contact Info: [facility]    Positives:  Missed doses   Negatives:  Signs/symptoms of thrombosis, Signs/symptoms of bleeding, Laboratory test error suspected, Change in health, Change in alcohol use, Change in activity, Upcoming invasive procedure, Emergency department visit, Upcoming dental procedure, Extra doses, Change in medications, Change in diet/appetite, Hospital admission, Bruising, Other complaints   Comments:  Missed Wednesday dose, thinks she may have thrown her medicine out  by accident-- reports one of her RX has . Discussed enox use, directions. Agreeable to  syringes if afforable through her ins   Weight: 158 lbs per pt 71 kg         Plan:     1. INR is SUBtherapeutic today at 1.19 (goal INR: 1.5-2.5) following missed dose. Instructed Ms. Queen to take warfarin 7.5 mg daily until recheck will additionally inject enox 1mg/kg q12h x 5 doses. Will boost tonight's warfarin dose to 10mg  If unable to  afford lovenox  2.Repeat INR 10/5  3. Verbal information provided over the phone. Deisi Alcalay RBV dosing instructions, expresses understanding by teach back, and has no further questions at this time.      Paulette Gonzalez PharmD.  10/01/21   14:42 EDT

## 2021-10-02 LAB — 25(OH)D3 SERPL-MCNC: 42.2 NG/ML (ref 30–100)

## 2021-10-04 ENCOUNTER — ANTICOAGULATION VISIT (OUTPATIENT)
Dept: PHARMACY | Facility: HOSPITAL | Age: 36
End: 2021-10-04

## 2021-10-04 ENCOUNTER — LAB (OUTPATIENT)
Dept: LAB | Facility: HOSPITAL | Age: 36
End: 2021-10-04

## 2021-10-04 DIAGNOSIS — Z95.2 HX OF AORTIC VALVE REPLACEMENT, MECHANICAL: Primary | ICD-10-CM

## 2021-10-04 DIAGNOSIS — Z95.2 HX OF AORTIC VALVE REPLACEMENT, MECHANICAL: ICD-10-CM

## 2021-10-04 LAB
INR PPP: 1.24 (ref 0.9–1.1)
PROTHROMBIN TIME: 16 SECONDS (ref 12.8–14.5)

## 2021-10-04 PROCEDURE — 36415 COLL VENOUS BLD VENIPUNCTURE: CPT

## 2021-10-04 PROCEDURE — 85610 PROTHROMBIN TIME: CPT

## 2021-10-04 NOTE — PROGRESS NOTES
Anticoagulation Clinic - Remote Progress Note  REMOTE LAB    Indication: mechanical AVR (On-X)  Referring Provider: Latoya (Last seen: 11/30/20 next appt: 11/2021)  Goal INR: 1.5-2.5  Current Drug Interactions: aspirin; melatonin    Diet: brussel sprouts or equivalent 1x/week 9/13/21  Alcohol: none  Tobacco: none  OTC Pain Medication: APAP PRN    INR History:  Date 11/21 12/19 1/24/20 2/20 3/13 4/30 5/28 6/19 7/16 9/3 11/4 11/27   Total WeeklyDose 40mg 40mg 40mg 40mg 40mg 40mg 40mg 40mg 40mg 40mg 40mg 40mg   INR 1.94 1.99 1.96 2.16 1.86 2.62 2.68 1.21 1.78 1.71 1.46 1.93   Notes  amox x7d     decr GLV? incr GLV   overdue overdue; rcvd 11/30     Date 1/14/21 2/19 4/7 5/13 5/28 6/9 7/14 8/30 9/13 9/20 10/1 10/4   Total WeeklyDose 40mg 40mg 40mg 40mg 40mg 40mg 40mg 40mg 40 mg 42.5 mg 37.5mg    INR 1.97 2.26 2.11 1.47 1.56 2.19 1.49 1.34 1.46 1.39 1.19 1.24   Notes overdue; rec'd 1/15          1x miss enox     Phone Interview:  Tablet Strength: 2.5 mg and 5 mg tablets  Patient Contact Info: [phone]   Lab Contact Info: [facility]      Negatives:  Signs/symptoms of thrombosis, Signs/symptoms of bleeding, Laboratory test error suspected, Change in health, Change in alcohol use, Change in activity, Upcoming invasive procedure, Emergency department visit, Upcoming dental procedure, Missed doses, Extra doses, Change in medications, Change in diet/appetite, Hospital admission, Bruising, Other complaints   Comments:  Again unable to determine likely cause of increased dosing needs       Plan:     1. INR remains SUBtherapeutic today at 1.24 (goal INR: 1.5-2.5) . Instructed Ms. Queen to increase tonight's dose to 10mg then take warfarin 7.5 mg daily until recheck will additionally inject enox 1mg/kg q12h x 4 more doses.   2.Repeat INR 10/7  3. Verbal information provided over the phone. Deisi Queen RBV dosing instructions, expresses understanding by teach back, and has no further questions at this time.    Paulette  Nesha LawrenceD.  10/04/21   12:05 EDT

## 2021-10-07 ENCOUNTER — LAB (OUTPATIENT)
Dept: LAB | Facility: HOSPITAL | Age: 36
End: 2021-10-07

## 2021-10-07 ENCOUNTER — ANTICOAGULATION VISIT (OUTPATIENT)
Dept: PHARMACY | Facility: HOSPITAL | Age: 36
End: 2021-10-07

## 2021-10-07 DIAGNOSIS — Z95.2 HX OF AORTIC VALVE REPLACEMENT, MECHANICAL: ICD-10-CM

## 2021-10-07 DIAGNOSIS — Z95.2 HX OF AORTIC VALVE REPLACEMENT, MECHANICAL: Primary | ICD-10-CM

## 2021-10-07 LAB
INR PPP: 1.59 (ref 0.9–1.1)
PROTHROMBIN TIME: 19.3 SECONDS (ref 12.8–14.5)

## 2021-10-07 PROCEDURE — 36415 COLL VENOUS BLD VENIPUNCTURE: CPT

## 2021-10-07 PROCEDURE — 85610 PROTHROMBIN TIME: CPT

## 2021-10-07 NOTE — PROGRESS NOTES
Anticoagulation Clinic - Remote Progress Note  REMOTE LAB    Indication: mechanical AVR (On-X)  Referring Provider: Latoya (Last seen: 11/30/20 next appt: 11/2021)  Goal INR: 1.5-2.5  Current Drug Interactions: aspirin; melatonin    Diet: brussel sprouts or equivalent 1x/week 9/13/21  Alcohol: none  Tobacco: none  OTC Pain Medication: APAP PRN    INR History:  Date 11/21 12/19 1/24/20 2/20 3/13 4/30 5/28 6/19 7/16 9/3 11/4 11/27   Total WeeklyDose 40mg 40mg 40mg 40mg 40mg 40mg 40mg 40mg 40mg 40mg 40mg 40mg   INR 1.94 1.99 1.96 2.16 1.86 2.62 2.68 1.21 1.78 1.71 1.46 1.93   Notes  amox x7d     decr GLV? incr GLV   overdue overdue; rcvd 11/30     Date 1/14/21 2/19 4/7 5/13 5/28 6/9 7/14 8/30 9/13 9/20 10/1 10/4   Total WeeklyDose 40mg 40mg 40mg 40mg 40mg 40mg 40mg 40mg 40 mg 42.5 mg 37.5mg 40mg   INR 1.97 2.26 2.11 1.47 1.56 2.19 1.49 1.34 1.46 1.39 1.19 1.24   Notes overdue; rec'd 1/15          1x miss enox       Date 10/7              Total WeeklyDose 52.5mg              INR 1.59              Notes                   Phone Interview:  Tablet Strength: 2.5 mg and 5 mg tablets  Patient Contact Info: [phone]   Lab Contact Info: [facility]        Positives:  Signs/symptoms of bleeding   Negatives:  Signs/symptoms of thrombosis, Laboratory test error suspected, Change in health, Change in alcohol use, Change in activity, Upcoming invasive procedure, Emergency department visit, Upcoming dental procedure, Missed doses, Extra doses, Change in medications, Change in diet/appetite, Hospital admission, Bruising, Other complaints   Comments:  Felt like she could taste blood + some bleeding gums x1 night but has resolved         Plan:     1. INR is therapeutic today at 1.59 (LLN) (goal INR: 1.5-2.5)  Instructed Ms. Queen to take warfarin 7.5 mg daily until recheck will discontinue enoxaparin  2.Repeat INR 10/11, patients half day at work   3. Verbal information provided over the phone. Deisi Queen RBV dosing  instructions, expresses understanding by teach back, and has no further questions at this time.    Nesha MirzaD.  10/07/21   08:43 EDT

## 2021-10-11 ENCOUNTER — ANTICOAGULATION VISIT (OUTPATIENT)
Dept: PHARMACY | Facility: HOSPITAL | Age: 36
End: 2021-10-11

## 2021-10-11 ENCOUNTER — LAB (OUTPATIENT)
Dept: LAB | Facility: HOSPITAL | Age: 36
End: 2021-10-11

## 2021-10-11 DIAGNOSIS — Z95.2 HX OF AORTIC VALVE REPLACEMENT, MECHANICAL: Primary | ICD-10-CM

## 2021-10-11 DIAGNOSIS — Z95.2 HX OF AORTIC VALVE REPLACEMENT, MECHANICAL: ICD-10-CM

## 2021-10-11 DIAGNOSIS — Q23.1 AORTIC STENOSIS DUE TO BICUSPID AORTIC VALVE: ICD-10-CM

## 2021-10-11 DIAGNOSIS — Q23.1 BICUSPID AORTIC VALVE: Primary | ICD-10-CM

## 2021-10-11 DIAGNOSIS — Q23.0 AORTIC STENOSIS DUE TO BICUSPID AORTIC VALVE: ICD-10-CM

## 2021-10-11 LAB
INR PPP: 1.57 (ref 0.9–1.1)
PROTHROMBIN TIME: 19.2 SECONDS (ref 12.8–14.5)

## 2021-10-11 PROCEDURE — 36415 COLL VENOUS BLD VENIPUNCTURE: CPT

## 2021-10-11 PROCEDURE — 85610 PROTHROMBIN TIME: CPT

## 2021-10-11 NOTE — PROGRESS NOTES
Anticoagulation Clinic - Remote Progress Note  REMOTE LAB    Indication: mechanical AVR (On-X)  Referring Provider: Latoya (Last seen: 11/30/20 next appt: 11/2021)  Goal INR: 1.5-2.5  Current Drug Interactions: aspirin; melatonin    Diet: brussel sprouts or equivalent 1x/week 9/13/21  Alcohol: none  Tobacco: none  OTC Pain Medication: APAP PRN    INR History:  Date 11/21 12/19 1/24/20 2/20 3/13 4/30 5/28 6/19 7/16 9/3 11/4 11/27   Total WeeklyDose 40mg 40mg 40mg 40mg 40mg 40mg 40mg 40mg 40mg 40mg 40mg 40mg   INR 1.94 1.99 1.96 2.16 1.86 2.62 2.68 1.21 1.78 1.71 1.46 1.93   Notes  amox x7d     decr GLV? incr GLV   overdue overdue; rcvd 11/30     Date 1/14/21 2/19 4/7 5/13 5/28 6/9 7/14 8/30 9/13 9/20 10/1 10/4   Total WeeklyDose 40mg 40mg 40mg 40mg 40mg 40mg 40mg 40mg 40 mg 42.5 mg 37.5mg 40mg   INR 1.97 2.26 2.11 1.47 1.56 2.19 1.49 1.34 1.46 1.39 1.19 1.24   Notes overdue; rec'd 1/15          1x miss enox       Date 10/7 10/11             Total WeeklyDose 52.5mg 55mg             INR 1.59 1.57             Notes  Zero GLV                 Phone Interview:  Tablet Strength: 2.5 mg and 5 mg tablets  Patient Contact Info: [phone]   Lab Contact Info: [facility]    Positives:  Change in diet/appetite   Negatives:  Signs/symptoms of thrombosis, Signs/symptoms of bleeding, Laboratory test error suspected, Change in health, Change in alcohol use, Change in activity, Upcoming invasive procedure, Emergency department visit, Upcoming dental procedure, Missed doses, Extra doses, Change in medications, Hospital admission, Bruising, Other complaints   Comments:  Has completely cut out GLV and would like to resume         Plan:     1. INR is therapeutic today at 1.57 (LLN) (goal INR: 1.5-2.5) Considering patient wishes to resume GLV,  Instructed Ms. Queen to take warfarin 7.5 mg daily except 10mg Mondays until recheck   2.Repeat INR 10/18  3. Verbal information provided over the phone. Deisi Queen RBV dosing instructions,  expresses understanding by teach back, and has no further questions at this time.      Nesha MirzaD.  10/11/21   12:07 EDT

## 2021-10-20 ENCOUNTER — LAB (OUTPATIENT)
Dept: LAB | Facility: HOSPITAL | Age: 36
End: 2021-10-20

## 2021-10-20 ENCOUNTER — ANTICOAGULATION VISIT (OUTPATIENT)
Dept: PHARMACY | Facility: HOSPITAL | Age: 36
End: 2021-10-20

## 2021-10-20 DIAGNOSIS — Z95.2 HX OF AORTIC VALVE REPLACEMENT, MECHANICAL: ICD-10-CM

## 2021-10-20 DIAGNOSIS — Z95.2 HX OF AORTIC VALVE REPLACEMENT, MECHANICAL: Primary | ICD-10-CM

## 2021-10-20 LAB
INR PPP: 1.91 (ref 0.9–1.1)
PROTHROMBIN TIME: 22.3 SECONDS (ref 12.8–14.5)

## 2021-10-20 PROCEDURE — 36415 COLL VENOUS BLD VENIPUNCTURE: CPT

## 2021-10-20 PROCEDURE — 85610 PROTHROMBIN TIME: CPT

## 2021-10-20 NOTE — PROGRESS NOTES
Anticoagulation Clinic - Remote Progress Note  REMOTE LAB    Indication: mechanical AVR (On-X)  Referring Provider: Latoya (Last seen: 11/30/20 next appt: 11/2021)  Goal INR: 1.5-2.5  Current Drug Interactions: aspirin; melatonin    Diet: brussel sprouts or equivalent 1x/week 10/20/21  Alcohol: none  Tobacco: none  OTC Pain Medication: APAP PRN    INR History:  Date 11/21 12/19 1/24/20 2/20 3/13 4/30 5/28 6/19 7/16 9/3 11/4 11/27   Total WeeklyDose 40mg 40mg 40mg 40mg 40mg 40mg 40mg 40mg 40mg 40mg 40mg 40mg   INR 1.94 1.99 1.96 2.16 1.86 2.62 2.68 1.21 1.78 1.71 1.46 1.93   Notes  amox x7d     decr GLV? incr GLV   overdue overdue; rcvd 11/30     Date 1/14/21 2/19 4/7 5/13 5/28 6/9 7/14 8/30 9/13 9/20 10/1 10/4   Total WeeklyDose 40mg 40mg 40mg 40mg 40mg 40mg 40mg 40mg 40 mg 42.5 mg 37.5mg 40mg   INR 1.97 2.26 2.11 1.47 1.56 2.19 1.49 1.34 1.46 1.39 1.19 1.24   Notes overdue; rec'd 1/15          1x miss enox     Date 10/7 10/11 10/20            Total WeeklyDose 52.5mg 55mg 55 mg            INR 1.59 1.57 1.91            Notes  Zero GLV               Phone Interview:  Tablet Strength: 2.5 mg and 5 mg tablets  Patient Contact Info: [phone]   Lab Contact Info: [facility]    Patient Findings:  Positives:  Change in diet/appetite   Negatives:  Signs/symptoms of thrombosis, Signs/symptoms of bleeding, Laboratory test error suspected, Change in health, Change in alcohol use, Change in activity, Upcoming invasive procedure, Emergency department visit, Upcoming dental procedure, Missed doses, Extra doses, Change in medications, Hospital admission, Bruising, Other complaints   Comments:  Patient confirms she has resumed eating some GLV - some broccoli and salad. She is aware to keep consistent. Denies any other changes.     Plan:     1. INR is therapeutic this morning at 1.91 (goal INR: 1.5-2.5). At this time, instructed Ms. Queen to continue warfarin 7.5 mg daily except 10 mg on Mondays until recheck.  2.Repeat INR in  two weeks, 11/3.  3. Verbal information provided over the phone. Deisi Alcalay RBV dosing instructions, expresses understanding by teach back, and has no further questions at this time.    Delphine You CPhT  10/20/2021  09:16 EDT     I, Paulette Gonzalez, PharmD, have reviewed the note in full and agree with the assessment and plan.  10/20/21  10:02 EDT

## 2021-11-24 ENCOUNTER — LAB (OUTPATIENT)
Dept: LAB | Facility: HOSPITAL | Age: 36
End: 2021-11-24

## 2021-11-24 DIAGNOSIS — Q23.1 AORTIC STENOSIS DUE TO BICUSPID AORTIC VALVE: ICD-10-CM

## 2021-11-24 DIAGNOSIS — Q23.1 BICUSPID AORTIC VALVE: ICD-10-CM

## 2021-11-24 DIAGNOSIS — Q23.0 AORTIC STENOSIS DUE TO BICUSPID AORTIC VALVE: ICD-10-CM

## 2021-11-24 DIAGNOSIS — Z95.2 HX OF AORTIC VALVE REPLACEMENT, MECHANICAL: ICD-10-CM

## 2021-11-24 LAB
INR PPP: 2.87 (ref 0.9–1.1)
PROTHROMBIN TIME: 30.4 SECONDS (ref 12.8–14.5)

## 2021-11-24 PROCEDURE — 36415 COLL VENOUS BLD VENIPUNCTURE: CPT

## 2021-11-24 PROCEDURE — 85610 PROTHROMBIN TIME: CPT

## 2021-11-29 ENCOUNTER — ANTICOAGULATION VISIT (OUTPATIENT)
Dept: PHARMACY | Facility: HOSPITAL | Age: 36
End: 2021-11-29

## 2021-11-29 DIAGNOSIS — Z95.2 HX OF AORTIC VALVE REPLACEMENT, MECHANICAL: Primary | ICD-10-CM

## 2021-11-29 NOTE — PROGRESS NOTES
Anticoagulation Clinic - Remote Progress Note  REMOTE LAB    Indication: mechanical AVR (On-X)  Referring Provider: Latoya (Last seen: 11/30/20 next appt: 11/2021)  Goal INR: 1.5-2.5  Current Drug Interactions: aspirin; melatonin    Diet: brussel sprouts or equivalent 1x/week 10/20/21  Alcohol: none  Tobacco: none  OTC Pain Medication: APAP PRN    INR History:  Date 11/21 12/19 1/24/20 2/20 3/13 4/30 5/28 6/19 7/16 9/3 11/4 11/27   Total WeeklyDose 40mg 40mg 40mg 40mg 40mg 40mg 40mg 40mg 40mg 40mg 40mg 40mg   INR 1.94 1.99 1.96 2.16 1.86 2.62 2.68 1.21 1.78 1.71 1.46 1.93   Notes  amox x7d     decr GLV? incr GLV   overdue overdue; rcvd 11/30     Date 1/14/21 2/19 4/7 5/13 5/28 6/9 7/14 8/30 9/13 9/20 10/1 10/4   Total WeeklyDose 40mg 40mg 40mg 40mg 40mg 40mg 40mg 40mg 40 mg 42.5 mg 37.5mg 40mg   INR 1.97 2.26 2.11 1.47 1.56 2.19 1.49 1.34 1.46 1.39 1.19 1.24   Notes overdue; rec'd 1/15          1x miss enox     Date 10/7 10/11 10/20 11/24           Total WeeklyDose 52.5mg 55mg 55 mg 55mg           INR 1.59 1.57 1.91 2.87           Notes  Zero GLV  rcv'd 11/29             Phone Interview:  Tablet Strength: 2.5 mg and 5 mg tablets  Patient Contact Info: [phone]   Lab Contact Info: [facility]      Negatives:  Signs/symptoms of thrombosis, Signs/symptoms of bleeding, Laboratory test error suspected, Change in health, Change in alcohol use, Change in activity, Upcoming invasive procedure, Emergency department visit, Upcoming dental procedure, Missed doses, Extra doses, Change in medications, Change in diet/appetite, Hospital admission, Bruising, Other complaints         Plan:     1. INR was slightly SUPRAtherapeutic on 11/24 at 2.87 received 11/29 (goal INR: 1.5-2.5). Unable to reach until 11/30.  At this time, instructed Ms. Queen to reduce tonight's dose to 5mg then take warfarin 7.5 mg daily until recheck.  2.Repeat INR in 12/7  3. Verbal information provided over the phone. Deisi Queen RBV dosing  instructions, expresses understanding by teach back, and has no further questions at this time.    Nesha MirzaD.  11/30/21   09:15 EST

## 2021-12-01 ENCOUNTER — OFFICE VISIT (OUTPATIENT)
Dept: CARDIOLOGY | Facility: CLINIC | Age: 36
End: 2021-12-01

## 2021-12-01 VITALS
BODY MASS INDEX: 30.55 KG/M2 | WEIGHT: 166 LBS | HEART RATE: 85 BPM | SYSTOLIC BLOOD PRESSURE: 124 MMHG | OXYGEN SATURATION: 98 % | DIASTOLIC BLOOD PRESSURE: 82 MMHG | HEIGHT: 62 IN

## 2021-12-01 DIAGNOSIS — I35.0 NONRHEUMATIC AORTIC VALVE STENOSIS: Primary | ICD-10-CM

## 2021-12-01 PROCEDURE — 99213 OFFICE O/P EST LOW 20 MIN: CPT | Performed by: INTERNAL MEDICINE

## 2022-01-04 ENCOUNTER — LAB (OUTPATIENT)
Dept: LAB | Facility: HOSPITAL | Age: 37
End: 2022-01-04

## 2022-01-04 DIAGNOSIS — Z95.2 HX OF AORTIC VALVE REPLACEMENT, MECHANICAL: ICD-10-CM

## 2022-01-04 DIAGNOSIS — Q23.1 BICUSPID AORTIC VALVE: ICD-10-CM

## 2022-01-04 DIAGNOSIS — Q23.0 AORTIC STENOSIS DUE TO BICUSPID AORTIC VALVE: ICD-10-CM

## 2022-01-04 DIAGNOSIS — Q23.1 AORTIC STENOSIS DUE TO BICUSPID AORTIC VALVE: ICD-10-CM

## 2022-01-04 LAB
INR PPP: 1.69 (ref 0.9–1.1)
PROTHROMBIN TIME: 20.3 SECONDS (ref 12.8–14.5)

## 2022-01-04 PROCEDURE — 85610 PROTHROMBIN TIME: CPT

## 2022-01-04 PROCEDURE — 36415 COLL VENOUS BLD VENIPUNCTURE: CPT

## 2022-01-05 ENCOUNTER — ANTICOAGULATION VISIT (OUTPATIENT)
Dept: PHARMACY | Facility: HOSPITAL | Age: 37
End: 2022-01-05

## 2022-01-05 DIAGNOSIS — Z95.2 HX OF AORTIC VALVE REPLACEMENT, MECHANICAL: Primary | ICD-10-CM

## 2022-01-05 NOTE — PROGRESS NOTES
Anticoagulation Clinic - Remote Progress Note  REMOTE LAB    Indication: mechanical AVR (On-X)  Referring Provider: Latoya (Last seen: 11/30/20 next appt: 11/2021)  Goal INR: 1.5-2.5  Current Drug Interactions: aspirin; melatonin    Diet: brussel sprouts or equivalent 1x/week 10/20/21  Alcohol: none  Tobacco: none  OTC Pain Medication: APAP PRN    INR History:  Date 11/21 12/19 1/24/20 2/20 3/13 4/30 5/28 6/19 7/16 9/3 11/4 11/27   Total WeeklyDose 40mg 40mg 40mg 40mg 40mg 40mg 40mg 40mg 40mg 40mg 40mg 40mg   INR 1.94 1.99 1.96 2.16 1.86 2.62 2.68 1.21 1.78 1.71 1.46 1.93   Notes  amox x7d     decr GLV? incr GLV   overdue overdue; rcvd 11/30     Date 1/14/21 2/19 4/7 5/13 5/28 6/9 7/14 8/30 9/13 9/20 10/1 10/4   Total WeeklyDose 40mg 40mg 40mg 40mg 40mg 40mg 40mg 40mg 40 mg 42.5 mg 37.5mg 40mg   INR 1.97 2.26 2.11 1.47 1.56 2.19 1.49 1.34 1.46 1.39 1.19 1.24   Notes overdue; rec'd 1/15          1x miss enox     Date 10/7 10/11 10/20 11/24  12/7 1/4/22         Total WeeklyDose 52.5mg 55mg 55 mg 55mg  overdue 52.5 mg         INR 1.59 1.57 1.91 2.87   1.69         Notes  Zero GLV  rcv'd 11/29  Recd 1/5           Phone Interview:  Tablet Strength: 2.5 mg and 5 mg tablets  Patient Contact Info: [phone]   Lab Contact Info: [facility]    Patient Findings    Negatives:  Signs/symptoms of thrombosis, Signs/symptoms of bleeding, Laboratory test error suspected, Change in health, Change in alcohol use, Change in activity, Upcoming invasive procedure, Emergency department visit, Upcoming dental procedure, Missed doses, Extra doses, Change in medications, Change in diet/appetite, Hospital admission, Bruising, Other complaints   Comments:  Reports negative findings       Plan:     1. INR was therapeutic on Tuesday at 1.69, results received 1/5 (goal INR: 1.5-2.5). Ms Queen was non compliant with follow up. Instructed Ms. Queen to continue warfarin 7.5 mg oral daily until recheck. She has been taking warfarin 7.5 mg po  daily since last encounter when she thought it was agreed upon to stop the 10 mg dose on Mondays.   2.Repeat INR on 2/1/2022.  3. Verbal information provided over the phone. Deisi Alcalay RBV dosing instructions, expresses understanding by teach back, and has no further questions at this time.    Genaro Chu, PharmD  1/7/2022  14:34 EST

## 2022-02-24 ENCOUNTER — TELEPHONE (OUTPATIENT)
Dept: PHARMACY | Facility: HOSPITAL | Age: 37
End: 2022-02-24

## 2022-03-04 ENCOUNTER — ANTICOAGULATION VISIT (OUTPATIENT)
Dept: PHARMACY | Facility: HOSPITAL | Age: 37
End: 2022-03-04

## 2022-03-04 ENCOUNTER — LAB (OUTPATIENT)
Dept: LAB | Facility: HOSPITAL | Age: 37
End: 2022-03-04

## 2022-03-04 DIAGNOSIS — Q23.1 AORTIC STENOSIS DUE TO BICUSPID AORTIC VALVE: ICD-10-CM

## 2022-03-04 DIAGNOSIS — Q23.0 AORTIC STENOSIS DUE TO BICUSPID AORTIC VALVE: ICD-10-CM

## 2022-03-04 DIAGNOSIS — Z95.2 HX OF AORTIC VALVE REPLACEMENT, MECHANICAL: Primary | ICD-10-CM

## 2022-03-04 DIAGNOSIS — Q23.1 BICUSPID AORTIC VALVE: ICD-10-CM

## 2022-03-04 DIAGNOSIS — Z95.2 HX OF AORTIC VALVE REPLACEMENT, MECHANICAL: ICD-10-CM

## 2022-03-04 LAB
INR PPP: 2.95 (ref 0.9–1.1)
PROTHROMBIN TIME: 31.1 SECONDS (ref 12.8–14.5)

## 2022-03-04 PROCEDURE — 36415 COLL VENOUS BLD VENIPUNCTURE: CPT

## 2022-03-04 PROCEDURE — 85610 PROTHROMBIN TIME: CPT

## 2022-03-04 NOTE — PROGRESS NOTES
Anticoagulation Clinic - Remote Progress Note  REMOTE LAB    Indication: mechanical AVR (On-X)  Referring Provider: Latoya (Last seen: 11/30/20 next appt: 11/2021)  Goal INR: 1.5-2.5  Current Drug Interactions: aspirin; melatonin    Diet: brussel sprouts or equivalent 1x/week 10/20/21  Alcohol: none  Tobacco: none  OTC Pain Medication: APAP PRN    INR History:  Date 11/21 12/19 1/24/20 2/20 3/13 4/30 5/28 6/19 7/16 9/3 11/4 11/27   Total WeeklyDose 40mg 40mg 40mg 40mg 40mg 40mg 40mg 40mg 40mg 40mg 40mg 40mg   INR 1.94 1.99 1.96 2.16 1.86 2.62 2.68 1.21 1.78 1.71 1.46 1.93   Notes  amox x7d     decr GLV? incr GLV   overdue overdue; rcvd 11/30     Date 1/14/21 2/19 4/7 5/13 5/28 6/9 7/14 8/30 9/13 9/20 10/1 10/4   Total WeeklyDose 40mg 40mg 40mg 40mg 40mg 40mg 40mg 40mg 40mg 42.5mg 37.5mg 40mg   INR 1.97 2.26 2.11 1.47 1.56 2.19 1.49 1.34 1.46 1.39 1.19 1.24   Notes overdue; rec'd 1/15          1x miss enox     Date 10/7 10/11 10/20 11/24 12/7 1/4/22  3/4       Total WeeklyDose 52.5mg 55mg 55mg 55mg non- compliant 52.5mg non- compliant 52.5mg       INR 1.59 1.57 1.91 2.87   1.69  2.95       Notes  Zero GLV  rcv'd 11/29  recv'd 1/5           Phone Interview:  Tablet Strength: 2.5 mg and 5 mg tablets  Patient Contact Info: [phone]   Lab Contact Info: [facility]    Patient Findings  Negatives:  Signs/symptoms of thrombosis, Signs/symptoms of bleeding, Laboratory test error suspected, Change in health, Change in alcohol use, Change in activity, Upcoming invasive procedure, Emergency department visit, Upcoming dental procedure, Missed doses, Extra doses, Change in medications, Change in diet/appetite, Hospital admission, Bruising, Other complaints   Comments:  Patient was non-compliant with requested follow. Patient confirms she received voicemail left on Fri, 3/4, and decreased that night's dose to 5mg. Otherwise feels she has been consistent with GLV intake and denies other findings.        Plan:     1. INR is  SUPRAtherapeutic today at 2.95 (goal INR: 1.5-2.5). Patient was again non-compliant with follow up. Instructed Ms. Queen to DECREASE tonight, 3/4, dose to 5mg and then continue warfarin 7.5mg oral daily until recheck  2.Repeat INR in ~1 week, 3/10, to ensure WNL  3. Verbal information provided over the phone. Deisi Queen RBV dosing instructions, expresses understanding by teach back, and has no further questions at this time.    Genaro Rodriguez, [place]  3/4/2022  16:16 ABY BROUSSARD, Paulette Gonzalez, PharmD, have reviewed the note in full and agree with the assessment and plan.  03/07/22  10:15 EST

## 2022-03-11 ENCOUNTER — ANTICOAGULATION VISIT (OUTPATIENT)
Dept: PHARMACY | Facility: HOSPITAL | Age: 37
End: 2022-03-11

## 2022-03-11 ENCOUNTER — LAB (OUTPATIENT)
Dept: LAB | Facility: HOSPITAL | Age: 37
End: 2022-03-11

## 2022-03-11 DIAGNOSIS — Z95.2 HX OF AORTIC VALVE REPLACEMENT, MECHANICAL: Primary | ICD-10-CM

## 2022-03-11 DIAGNOSIS — Z95.2 HX OF AORTIC VALVE REPLACEMENT, MECHANICAL: ICD-10-CM

## 2022-03-11 LAB
INR PPP: 2.05 (ref 0.9–1.1)
PROTHROMBIN TIME: 23.5 SECONDS (ref 12.8–14.5)

## 2022-03-11 PROCEDURE — 36415 COLL VENOUS BLD VENIPUNCTURE: CPT

## 2022-03-11 PROCEDURE — 85610 PROTHROMBIN TIME: CPT

## 2022-03-11 NOTE — PROGRESS NOTES
Anticoagulation Clinic - Remote Progress Note  REMOTE LAB    Indication: mechanical AVR (On-X)  Referring Provider: Latoya (Last seen: 11/30/20 next appt: 11/2021)  Goal INR: 1.5-2.5  Current Drug Interactions: aspirin; melatonin    Diet: brussel sprouts or equivalent 1x/week (3/11/22)  Alcohol: none  Tobacco: none  OTC Pain Medication: APAP PRN    INR History:  Date 11/21 12/19 1/24/20 2/20 3/13 4/30 5/28 6/19 7/16 9/3 11/4 11/27   Total WeeklyDose 40mg 40mg 40mg 40mg 40mg 40mg 40mg 40mg 40mg 40mg 40mg 40mg   INR 1.94 1.99 1.96 2.16 1.86 2.62 2.68 1.21 1.78 1.71 1.46 1.93   Notes  amox x7d     decr GLV? incr GLV   overdue overdue; rcvd 11/30     Date 1/14/21 2/19 4/7 5/13 5/28 6/9 7/14 8/30 9/13 9/20 10/1 10/4   Total WeeklyDose 40mg 40mg 40mg 40mg 40mg 40mg 40mg 40mg 40mg 42.5mg 37.5mg 40mg   INR 1.97 2.26 2.11 1.47 1.56 2.19 1.49 1.34 1.46 1.39 1.19 1.24   Notes overdue; rec'd 1/15          1x miss enox     Date 10/7 10/11 10/20 11/24 12/7 1/4/22  3/4 3/11      Total WeeklyDose 52.5mg 55mg 55mg 55mg non- compliant 52.5mg non- compliant 52.5mg 50mg 52.5mg     INR 1.59 1.57 1.91 2.87   1.69  2.95 2.05      Notes  Zero GLV  rcv'd 11/29  recv'd 1/5   1x decr dose        Phone Interview:  Tablet Strength: 2.5 mg and 5 mg tablets  Patient Contact Info: [phone]   Lab Contact Info: [facility]      Patient Findings  Negatives:  Signs/symptoms of thrombosis, Signs/symptoms of bleeding, Laboratory test error suspected, Change in health, Change in alcohol use, Change in activity, Upcoming invasive procedure, Emergency department visit, Upcoming dental procedure, Missed doses, Extra doses, Change in medications, Change in diet/appetite, Hospital admission, Bruising, Other complaints     Plan:     1. INR is therapeutic and back WNL today at 2.05 (goal 1.5-2.5). Instructed Ms. Queen to continue warfarin 7.5mg oral daily until recheck (52.5mg/week)  2. Repeat INR in 2 weeks, 3/25, to ensure WNL. Will consider extending  if therapeutic at next encounter  3. Verbal information provided over the phone. Deisi Queen RBV dosing instructions, expresses understanding by teach back, and has no further questions at this time.    Genaro Rodriguez, [place]  3/11/2022  14:29 Paulette MANZANO, PharmD, have reviewed the note in full and agree with the assessment and plan.  03/11/22  14:57 EST

## 2022-03-30 RX ORDER — WARFARIN SODIUM 2.5 MG/1
TABLET ORAL
Qty: 180 TABLET | Refills: 1 | Status: SHIPPED | OUTPATIENT
Start: 2022-03-30 | End: 2023-03-01

## 2022-03-30 RX ORDER — WARFARIN SODIUM 5 MG/1
TABLET ORAL
Qty: 90 TABLET | Refills: 1 | Status: SHIPPED | OUTPATIENT
Start: 2022-03-30 | End: 2022-07-08 | Stop reason: SDUPTHER

## 2022-04-13 ENCOUNTER — TELEPHONE (OUTPATIENT)
Dept: PHARMACY | Facility: HOSPITAL | Age: 37
End: 2022-04-13

## 2022-04-21 ENCOUNTER — ANTICOAGULATION VISIT (OUTPATIENT)
Dept: PHARMACY | Facility: HOSPITAL | Age: 37
End: 2022-04-21

## 2022-04-21 ENCOUNTER — LAB (OUTPATIENT)
Dept: LAB | Facility: HOSPITAL | Age: 37
End: 2022-04-21

## 2022-04-21 DIAGNOSIS — Q23.1 BICUSPID AORTIC VALVE: ICD-10-CM

## 2022-04-21 DIAGNOSIS — Z95.2 HX OF AORTIC VALVE REPLACEMENT, MECHANICAL: Primary | ICD-10-CM

## 2022-04-21 DIAGNOSIS — Z95.2 HX OF AORTIC VALVE REPLACEMENT, MECHANICAL: ICD-10-CM

## 2022-04-21 DIAGNOSIS — Q23.0 AORTIC STENOSIS DUE TO BICUSPID AORTIC VALVE: ICD-10-CM

## 2022-04-21 DIAGNOSIS — Q23.1 AORTIC STENOSIS DUE TO BICUSPID AORTIC VALVE: ICD-10-CM

## 2022-04-21 LAB
INR PPP: 1.52 (ref 0.9–1.1)
PROTHROMBIN TIME: 18.6 SECONDS (ref 12.1–14.7)

## 2022-04-21 PROCEDURE — 36415 COLL VENOUS BLD VENIPUNCTURE: CPT

## 2022-04-21 PROCEDURE — 85610 PROTHROMBIN TIME: CPT

## 2022-04-21 NOTE — PROGRESS NOTES
Anticoagulation Clinic - Remote Progress Note  REMOTE LAB    Indication: mechanical AVR (On-X)  Referring Provider: Latoya (Last seen: 11/30/20 next appt: 11/2021)  Goal INR: 1.5-2.5  Current Drug Interactions: aspirin; melatonin    Diet: brussel sprouts or equivalent 1x/week (3/11/22)  Alcohol: none  Tobacco: none  OTC Pain Medication: APAP PRN    INR History:  Date 11/21 12/19 1/24/20 2/20 3/13 4/30 5/28 6/19 7/16 9/3 11/4 11/27   Total WeeklyDose 40mg 40mg 40mg 40mg 40mg 40mg 40mg 40mg 40mg 40mg 40mg 40mg   INR 1.94 1.99 1.96 2.16 1.86 2.62 2.68 1.21 1.78 1.71 1.46 1.93   Notes  amox x7d     decr GLV? incr GLV   overdue overdue; rcvd 11/30     Date 1/14/21 2/19 4/7 5/13 5/28 6/9 7/14 8/30 9/13 9/20 10/1 10/4   Total WeeklyDose 40mg 40mg 40mg 40mg 40mg 40mg 40mg 40mg 40mg 42.5mg 37.5mg 40mg   INR 1.97 2.26 2.11 1.47 1.56 2.19 1.49 1.34 1.46 1.39 1.19 1.24   Notes overdue; rec'd 1/15          1x miss enox     Date 10/7 10/11 10/20 11/24 12/7 1/4/22  3/4 3/11 4/21     Total WeeklyDose 52.5mg 55mg 55mg 55mg non- compliant 52.5mg non- compliant 52.5mg 50mg 52.5mg     INR 1.59 1.57 1.91 2.87   1.69  2.95 2.05 1.52     Notes  Zero GLV  rcv'd 11/29  recv'd 1/5   1x decr dose        Phone Interview:  Tablet Strength: 2.5 mg and 5 mg tablets  Patient Contact Info: [phone]   Lab Contact Info: [facility]          Plan:     1. INR is therapeutic and back WNL today at 1.52(goal 1.5-2.5). Instructed Ms. Queen to continue warfarin 7.5mg oral daily until recheck (52.5mg/week)  2. Repeat INR in 4 weeks, 5/19, to ensure WNL.   3. Verbal information provided over the phone. Deisi Queen RBV dosing instructions, expresses understanding by teach back, and has no further questions at this time.    Thank you,    Jayden JeffriesD, NENA, BCPS  4/21/2022  10:31 EDT

## 2022-05-26 ENCOUNTER — TELEPHONE (OUTPATIENT)
Dept: PHARMACY | Facility: HOSPITAL | Age: 37
End: 2022-05-26

## 2022-07-08 ENCOUNTER — LAB (OUTPATIENT)
Dept: LAB | Facility: HOSPITAL | Age: 37
End: 2022-07-08

## 2022-07-08 ENCOUNTER — ANTICOAGULATION VISIT (OUTPATIENT)
Dept: PHARMACY | Facility: HOSPITAL | Age: 37
End: 2022-07-08

## 2022-07-08 DIAGNOSIS — Z95.2 HX OF AORTIC VALVE REPLACEMENT, MECHANICAL: ICD-10-CM

## 2022-07-08 DIAGNOSIS — Z95.2 HX OF AORTIC VALVE REPLACEMENT, MECHANICAL: Primary | ICD-10-CM

## 2022-07-08 LAB
INR PPP: 2.69 (ref 0.9–1.1)
PROTHROMBIN TIME: 29.4 SECONDS (ref 12.1–14.7)

## 2022-07-08 PROCEDURE — 85610 PROTHROMBIN TIME: CPT

## 2022-07-08 PROCEDURE — 36415 COLL VENOUS BLD VENIPUNCTURE: CPT

## 2022-07-08 RX ORDER — WARFARIN SODIUM 5 MG/1
TABLET ORAL
Qty: 30 TABLET | Refills: 0 | Status: SHIPPED | OUTPATIENT
Start: 2022-07-08 | End: 2023-02-03

## 2022-07-08 NOTE — PROGRESS NOTES
Anticoagulation Clinic - Remote Progress Note  REMOTE LAB    Indication: mechanical AVR (On-X)  Referring Provider: Latoya (Last seen: 11/21 next appt: 12/2022)  Goal INR: 1.5-2.5  Current Drug Interactions: aspirin; melatonin    Diet: brussel sprouts or equivalent 1x/week (7/8/2022)  Alcohol: none  Tobacco: none  OTC Pain Medication: APAP PRN    INR History:  Date 11/21 12/19 1/24/20 2/20 3/13 4/30 5/28 6/19 7/16 9/3 11/4 11/27   Total WeeklyDose 40mg 40mg 40mg 40mg 40mg 40mg 40mg 40mg 40mg 40mg 40mg 40mg   INR 1.94 1.99 1.96 2.16 1.86 2.62 2.68 1.21 1.78 1.71 1.46 1.93   Notes  amox x7d     decr GLV? incr GLV   overdue overdue; rcvd 11/30     Date 1/14/21 2/19 4/7 5/13 5/28 6/9 7/14 8/30 9/13 9/20 10/1 10/4   Total WeeklyDose 40mg 40mg 40mg 40mg 40mg 40mg 40mg 40mg 40mg 42.5mg 37.5mg 40mg   INR 1.97 2.26 2.11 1.47 1.56 2.19 1.49 1.34 1.46 1.39 1.19 1.24   Notes overdue; rec'd 1/15          1x miss enox     Date 10/7 10/11 10/20 11/24 12/7 1/4/22  3/4 3/11 4/21  7/8   Total WeeklyDose 52.5mg 55mg 55mg 55mg non- compliant 52.5mg non- compliant 52.5mg 50mg 52.5mg     INR 1.59 1.57 1.91 2.87   1.69  2.95 2.05 1.52  2.69   Notes  Zero GLV  rcv'd 11/29  recv'd 1/5   1x decr dose   zoloft initiation     Phone Interview:  Tablet Strength: 2.5 mg and 5 mg tablets  Patient Contact Info: [phone]  Lab Contact Info: [facility]    Patient Findings  Positives:  Change in medications   Negatives:  Signs/symptoms of thrombosis, Signs/symptoms of bleeding, Laboratory test error suspected, Change in health, Change in alcohol use, Change in activity, Upcoming invasive procedure, Emergency department visit, Upcoming dental procedure, Missed doses, Extra doses, Change in diet/appetite, Hospital admission, Bruising, Other complaints   Comments:  Ms. Queen is aware of non-compliance and will plan to call the clinic when she has cell service. (She doesn't have cell service at home). Started zoloft a month ago.      Plan:     1.  INR is is SUPRAtherapeutic at 2.69 (goal 1.5-2.5).  Instructed Ms. Queen to continue warfarin 7.5mg oral daily until recheck (52.5mg/week)  2. Repeat INR in 1.5 weeks.   3. New PT/INR order sent in.  4. 30 day refill of warfarin 5mg sent in due to noncompliance.    5. Verbal information provided over the phone. Deisi Queen RBV dosing instructions, expresses understanding by teach back, and has no further questions at this time.    Carmelita Morales, PharmD  7/8/2022  14:18 EDT

## 2022-07-19 ENCOUNTER — LAB (OUTPATIENT)
Dept: LAB | Facility: HOSPITAL | Age: 37
End: 2022-07-19

## 2022-07-19 ENCOUNTER — ANTICOAGULATION VISIT (OUTPATIENT)
Dept: PHARMACY | Facility: HOSPITAL | Age: 37
End: 2022-07-19

## 2022-07-19 DIAGNOSIS — Z95.2 HX OF AORTIC VALVE REPLACEMENT, MECHANICAL: Primary | ICD-10-CM

## 2022-07-19 DIAGNOSIS — Z95.2 HX OF AORTIC VALVE REPLACEMENT, MECHANICAL: ICD-10-CM

## 2022-07-19 LAB
INR PPP: 2.18 (ref 0.9–1.1)
PROTHROMBIN TIME: 24.9 SECONDS (ref 12.1–14.7)

## 2022-07-19 PROCEDURE — 36415 COLL VENOUS BLD VENIPUNCTURE: CPT

## 2022-07-19 PROCEDURE — 85610 PROTHROMBIN TIME: CPT

## 2022-07-19 NOTE — PROGRESS NOTES
Anticoagulation Clinic - Remote Progress Note  REMOTE LAB    Indication: mechanical AVR (On-X)  Referring Provider: Latoya (Last seen: 11/21 next appt: 12/2022)  Goal INR: 1.5-2.5  Current Drug Interactions: aspirin; melatonin    Diet: brussel sprouts or equivalent 1x/week (7/8/2022)  Alcohol: none  Tobacco: none  OTC Pain Medication: APAP PRN    INR History:  Date 11/21 12/19 1/24/20 2/20 3/13 4/30 5/28 6/19 7/16 9/3 11/4 11/27   Total WeeklyDose 40mg 40mg 40mg 40mg 40mg 40mg 40mg 40mg 40mg 40mg 40mg 40mg   INR 1.94 1.99 1.96 2.16 1.86 2.62 2.68 1.21 1.78 1.71 1.46 1.93   Notes  amox x7d     decr GLV? incr GLV   overdue overdue; rcvd 11/30     Date 1/14/21 2/19 4/7 5/13 5/28 6/9 7/14 8/30 9/13 9/20 10/1 10/4   Total WeeklyDose 40mg 40mg 40mg 40mg 40mg 40mg 40mg 40mg 40mg 42.5mg 37.5mg 40mg   INR 1.97 2.26 2.11 1.47 1.56 2.19 1.49 1.34 1.46 1.39 1.19 1.24   Notes overdue; rec'd 1/15          1x miss enox     Date 10/7 10/11 10/20 11/24 12/7 1/4/22  3/4 3/11 4/21  7/8   Total WeeklyDose 52.5mg 55mg 55mg 55mg non- compliant 52.5mg non- compliant 52.5mg 50mg 52.5mg  52.5mg   INR 1.59 1.57 1.91 2.87   1.69  2.95 2.05 1.52  2.69   Notes  Zero GLV  rcv'd 11/29  recv'd 1/5   1x decr dose   zoloft initiation       Date 7/19              Total WeeklyDose 50mg              INR 2.18              Notes               Phone Interview:  Tablet Strength: 2.5 mg and 5 mg tablets  Patient Contact Info: [phone]  Lab Contact Info: [facility]    Patient Findings      Negatives:  Signs/symptoms of thrombosis, Signs/symptoms of bleeding, Laboratory test error suspected, Change in health, Change in alcohol use, Change in activity, Upcoming invasive procedure, Emergency department visit, Upcoming dental procedure, Missed doses, Extra doses, Change in medications, Change in diet/appetite, Hospital admission, Bruising, Other complaints       Plan:     1. INR is is therapeutic at 2.18  (goal 1.5-2.5).  Instructed Ms. Queen to continue  "warfarin 7.5mg oral daily except 5mg Friday until recheck (50mg/week)  2. Repeat INR in 1.5 weeks.   3. Messaged Dr Klein RN for prophylactic abx-- per ADELIA Conde \"[facility] only gives abx for artificial valve pts with hx of endocarditis\"  4.. Verbal information provided over the phone. Deisi Alcalay RBV dosing instructions, expresses understanding by teach back, and has no further questions at this time.    Paulette Gonzalez, PharmD  7/19/2022  13:28 EDT      "

## 2022-08-10 ENCOUNTER — ANTICOAGULATION VISIT (OUTPATIENT)
Dept: PHARMACY | Facility: HOSPITAL | Age: 37
End: 2022-08-10

## 2022-08-10 ENCOUNTER — LAB (OUTPATIENT)
Dept: LAB | Facility: HOSPITAL | Age: 37
End: 2022-08-10

## 2022-08-10 DIAGNOSIS — Z95.2 HX OF AORTIC VALVE REPLACEMENT, MECHANICAL: ICD-10-CM

## 2022-08-10 DIAGNOSIS — Z95.2 HX OF AORTIC VALVE REPLACEMENT, MECHANICAL: Primary | ICD-10-CM

## 2022-08-10 LAB
INR PPP: 2.66 (ref 0.9–1.1)
PROTHROMBIN TIME: 29.1 SECONDS (ref 12.1–14.7)

## 2022-08-10 PROCEDURE — 85610 PROTHROMBIN TIME: CPT

## 2022-08-10 PROCEDURE — 36415 COLL VENOUS BLD VENIPUNCTURE: CPT

## 2022-08-10 NOTE — PROGRESS NOTES
Anticoagulation Clinic - Remote Progress Note  REMOTE LAB    Indication: mechanical AVR (On-X)  Referring Provider: Latoya (Last seen: 11/21 next appt: 12/2022)  Goal INR: 1.5-2.5  Current Drug Interactions: aspirin; melatonin    Diet: brussel sprouts or equivalent 1x/week (7/8/2022)  Alcohol: none  Tobacco: none  OTC Pain Medication: APAP PRN    INR History:  Date 11/21 12/19 1/24/20 2/20 3/13 4/30 5/28 6/19 7/16 9/3 11/4 11/27   Total WeeklyDose 40mg 40mg 40mg 40mg 40mg 40mg 40mg 40mg 40mg 40mg 40mg 40mg   INR 1.94 1.99 1.96 2.16 1.86 2.62 2.68 1.21 1.78 1.71 1.46 1.93   Notes  amox x7d     decr GLV? incr GLV   overdue overdue; rcvd 11/30     Date 1/14/21 2/19 4/7 5/13 5/28 6/9 7/14 8/30 9/13 9/20 10/1 10/4   Total WeeklyDose 40mg 40mg 40mg 40mg 40mg 40mg 40mg 40mg 40mg 42.5mg 37.5mg 40mg   INR 1.97 2.26 2.11 1.47 1.56 2.19 1.49 1.34 1.46 1.39 1.19 1.24   Notes overdue; rec'd 1/15          1x miss enox     Date 10/7 10/11 10/20 11/24 12/7 1/4/22  3/4 3/11 4/21  7/8   Total WeeklyDose 52.5mg 55mg 55mg 55mg non- compliant 52.5mg non- compliant 52.5mg 50mg 52.5mg  52.5mg   INR 1.59 1.57 1.91 2.87   1.69  2.95 2.05 1.52  2.69   Notes  Zero GLV  rcv'd 11/29  recv'd 1/5   1x decr dose   zoloft initiation       Date 7/19 8/10             Total WeeklyDose 50mg 50mg             INR 2.18 2.66             Notes               Phone Interview:  Tablet Strength: 2.5 mg and 5 mg tablets  Patient Contact Info: [phone]  Lab Contact Info: [facility]    Patient Findings    Negatives:  Signs/symptoms of thrombosis, Signs/symptoms of bleeding, Laboratory test error suspected, Change in health, Change in alcohol use, Change in activity, Upcoming invasive procedure, Emergency department visit, Upcoming dental procedure, Missed doses, Extra doses, Change in medications, Change in diet/appetite, Hospital admission, Bruising, Other complaints       Plan:     1. INR is is SUPRAtherapeutic at 2.66  (goal 1.5-2.5). Patient was  "noncompliant with requested recheck date.  Instructed Ms. Queen to reduce dose to warfarin 7.5mg oral daily except 5mg WedSat until recheck (47.5mg/week)  2. Repeat INR in 2 weeks  3. Messaged Dr Latoya KENNEDY for prophylactic abx-- per ADELIA Conde \"[facility] only gives abx for artificial valve pts with hx of endocarditis\"-- Patient reports she has a a cleaning 8/12 and plans to call Dr Latoya KENNEDY to discuss  4.. Verbal information provided over the phone. Deisi Queen RBV dosing instructions, expresses understanding by teach back, and has no further questions at this time.      Paulette Gonzalez PharmD.  08/10/22   10:22 EDT      "

## 2022-09-20 ENCOUNTER — ANTICOAGULATION VISIT (OUTPATIENT)
Dept: PHARMACY | Facility: HOSPITAL | Age: 37
End: 2022-09-20

## 2022-09-20 ENCOUNTER — LAB (OUTPATIENT)
Dept: LAB | Facility: HOSPITAL | Age: 37
End: 2022-09-20

## 2022-09-20 DIAGNOSIS — Z95.2 HX OF AORTIC VALVE REPLACEMENT, MECHANICAL: Primary | ICD-10-CM

## 2022-09-20 DIAGNOSIS — Z95.2 HX OF AORTIC VALVE REPLACEMENT, MECHANICAL: ICD-10-CM

## 2022-09-20 LAB
INR PPP: 2.08 (ref 0.9–1.1)
PROTHROMBIN TIME: 24 SECONDS (ref 12.1–14.7)

## 2022-09-20 PROCEDURE — 85610 PROTHROMBIN TIME: CPT

## 2022-09-20 PROCEDURE — 36415 COLL VENOUS BLD VENIPUNCTURE: CPT

## 2022-09-20 NOTE — PROGRESS NOTES
Anticoagulation Clinic - Remote Progress Note  REMOTE LAB    Indication: mechanical AVR (On-X)  Referring Provider: Latoya (Last seen: 11/21 next appt: 12/2022)  Goal INR: 1.5-2.5  Current Drug Interactions: aspirin; melatonin    Diet: brussel sprouts or equivalent 1x/week (7/8/2022)  Alcohol: none  Tobacco: none  OTC Pain Medication: APAP PRN    INR History:  Date 11/21 12/19 1/24/20 2/20 3/13 4/30 5/28 6/19 7/16 9/3 11/4 11/27   Total WeeklyDose 40mg 40mg 40mg 40mg 40mg 40mg 40mg 40mg 40mg 40mg 40mg 40mg   INR 1.94 1.99 1.96 2.16 1.86 2.62 2.68 1.21 1.78 1.71 1.46 1.93   Notes  amox x7d     decr GLV? incr GLV   overdue overdue; rcvd 11/30     Date 1/14/21 2/19 4/7 5/13 5/28 6/9 7/14 8/30 9/13 9/20 10/1 10/4   Total WeeklyDose 40mg 40mg 40mg 40mg 40mg 40mg 40mg 40mg 40mg 42.5mg 37.5mg 40mg   INR 1.97 2.26 2.11 1.47 1.56 2.19 1.49 1.34 1.46 1.39 1.19 1.24   Notes overdue; rec'd 1/15          1x miss enox     Date 10/7 10/11 10/20 11/24 12/7 1/4/22  3/4 3/11 4/21  7/8   Total WeeklyDose 52.5mg 55mg 55mg 55mg non- compliant 52.5mg non- compliant 52.5mg 50mg 52.5mg  52.5mg   INR 1.59 1.57 1.91 2.87   1.69  2.95 2.05 1.52  2.69   Notes  Zero GLV  rcv'd 11/29  recv'd 1/5   1x decr dose   zoloft initiation       Date 7/19 8/10 9/20            Total WeeklyDose 50mg 50mg 47.5 mg            INR 2.18 2.66 2.08            Notes               Phone Interview:  Tablet Strength: 2.5 mg and 5 mg tablets  Patient Contact Info: [phone]  Lab Contact Info: [facility]    Patient Findings    Negatives:  Signs/symptoms of thrombosis, Signs/symptoms of bleeding, Laboratory test error suspected, Change in health, Change in alcohol use, Change in activity, Upcoming invasive procedure, Emergency department visit, Upcoming dental procedure, Missed doses, Extra doses, Change in medications, Change in diet/appetite, Hospital admission, Bruising, Other complaints   Comments:  All findings negative per pt.      Plan:     1. INR is  "therapeutic today at 2.08  (goal 1.5-2.5). Patient was noncompliant with requested recheck date.  Instructed Ms. Queen to continue warfarin 7.5mg oral daily except 5mg WedSat until recheck (47.5mg/week)  2. Repeat INR in two weeks, 10/4/20.  3. Messaged Dr Latoya KENNEDY for prophylactic abx-- per ADELIA Conde \"[facility] only gives abx for artificial valve pts with hx of endocarditis\"-- Patient reports she has a a cleaning 8/12 and plans to call Dr Latoya KENNEDY to discuss  4.. Verbal information provided over the phone. Deisi Queen RBV dosing instructions, expresses understanding by teach back, and has no further questions at this time.    Cem Navas CPhT  9/20/2022  15:55 EDT     LVM-- can check in 4 weeks  I, Paulette Gonzalez, PharmD, have reviewed the note in full and agree with the assessment and plan.  09/20/22  16:10 EDT        "

## 2022-10-26 ENCOUNTER — TELEPHONE (OUTPATIENT)
Dept: PHARMACY | Facility: HOSPITAL | Age: 37
End: 2022-10-26

## 2022-12-01 ENCOUNTER — ANTICOAGULATION VISIT (OUTPATIENT)
Dept: PHARMACY | Facility: HOSPITAL | Age: 37
End: 2022-12-01

## 2022-12-01 ENCOUNTER — LAB (OUTPATIENT)
Dept: LAB | Facility: HOSPITAL | Age: 37
End: 2022-12-01

## 2022-12-01 DIAGNOSIS — Z95.2 HX OF AORTIC VALVE REPLACEMENT, MECHANICAL: ICD-10-CM

## 2022-12-01 DIAGNOSIS — Z95.2 HX OF AORTIC VALVE REPLACEMENT, MECHANICAL: Primary | ICD-10-CM

## 2022-12-01 LAB
INR PPP: 2.59 (ref 0.9–1.1)
PROTHROMBIN TIME: 28.5 SECONDS (ref 12.1–14.7)

## 2022-12-01 PROCEDURE — 85610 PROTHROMBIN TIME: CPT

## 2022-12-01 PROCEDURE — 36415 COLL VENOUS BLD VENIPUNCTURE: CPT

## 2022-12-01 NOTE — PROGRESS NOTES
Anticoagulation Clinic - Remote Progress Note  REMOTE LAB    Indication: mechanical AVR (On-X)  Referring Provider: Latoya (Last seen: 11/21 next appt: 12/2022)  Goal INR: 1.5-2.5  Current Drug Interactions: aspirin; melatonin    Diet: brussel sprouts or equivalent 1x/week (7/8/2022)  Alcohol: none  Tobacco: none  OTC Pain Medication: APAP PRN    INR History:  Date 11/21 12/19 1/24/20 2/20 3/13 4/30 5/28 6/19 7/16 9/3 11/4 11/27   Total WeeklyDose 40mg 40mg 40mg 40mg 40mg 40mg 40mg 40mg 40mg 40mg 40mg 40mg   INR 1.94 1.99 1.96 2.16 1.86 2.62 2.68 1.21 1.78 1.71 1.46 1.93   Notes  amox x7d     decr GLV? incr GLV   overdue overdue; rcvd 11/30     Date 1/14/21 2/19 4/7 5/13 5/28 6/9 7/14 8/30 9/13 9/20 10/1 10/4   Total WeeklyDose 40mg 40mg 40mg 40mg 40mg 40mg 40mg 40mg 40mg 42.5mg 37.5mg 40mg   INR 1.97 2.26 2.11 1.47 1.56 2.19 1.49 1.34 1.46 1.39 1.19 1.24   Notes overdue; rec'd 1/15          1x miss enox     Date 10/7 10/11 10/20 11/24 12/7 1/4/22  3/4 3/11 4/21  7/8   Total WeeklyDose 52.5mg 55mg 55mg 55mg non- compliant 52.5mg non- compliant 52.5mg 50mg 52.5mg  52.5mg   INR 1.59 1.57 1.91 2.87   1.69  2.95 2.05 1.52  2.69   Notes  Zero GLV  rcv'd 11/29  recv'd 1/5   1x decr dose   zoloft initiation       Date 7/19 8/10 9/20 12/1           Total WeeklyDose 50mg 50mg 47.5 mg 47.5 mg           INR 2.18 2.66 2.08 2.59           Notes    Due 10/4           Phone Interview:  Tablet Strength: 2.5 mg and 5 mg tablets  Patient Contact Info: [phone]  Lab Contact Info: [facility]    Patient Findings  Negatives:  Signs/symptoms of thrombosis, Signs/symptoms of bleeding, Laboratory test error suspected, Change in health, Change in alcohol use, Change in activity, Upcoming invasive procedure, Emergency department visit, Upcoming dental procedure, Missed doses, Extra doses, Change in medications, Change in diet/appetite, Hospital admission, Bruising, Other complaints   Comments:  All findings negative per pt.      Plan:  "    1. INR is slightly supratherapeutic today at 2.59 (goal 1.5-2.5). Patient was noncompliant with requested recheck date. Instructed Ms. Queen to continue warfarin 7.5mg oral daily except 5mg WedSat until recheck (47.5mg/week)  2. Repeat INR in 4 weeks, ~12/29.  3. Messaged Dr Klein RN for prophylactic abx-- per ADELIA Conde \"[facility] only gives abx for artificial valve pts with hx of endocarditis\"-- Patient reports she has a a cleaning 8/12 and plans to call Dr Klein RN to discuss  4.. Verbal information provided over the phone. Deisi Queen RBV dosing instructions, expresses understanding by teach back, and has no further questions at this time.    Deven Munguia, PharmD, BCPS  12/1/2022  16:14 EST      "

## 2022-12-05 ENCOUNTER — OFFICE VISIT (OUTPATIENT)
Dept: CARDIOLOGY | Facility: CLINIC | Age: 37
End: 2022-12-05

## 2022-12-05 VITALS
HEIGHT: 62 IN | BODY MASS INDEX: 34.45 KG/M2 | OXYGEN SATURATION: 97 % | WEIGHT: 187.2 LBS | HEART RATE: 75 BPM | SYSTOLIC BLOOD PRESSURE: 132 MMHG | DIASTOLIC BLOOD PRESSURE: 82 MMHG

## 2022-12-05 DIAGNOSIS — I35.0 NONRHEUMATIC AORTIC VALVE STENOSIS: Primary | ICD-10-CM

## 2022-12-05 PROCEDURE — 99213 OFFICE O/P EST LOW 20 MIN: CPT | Performed by: INTERNAL MEDICINE

## 2022-12-05 PROCEDURE — 93000 ELECTROCARDIOGRAM COMPLETE: CPT | Performed by: INTERNAL MEDICINE

## 2022-12-05 RX ORDER — BUDESONIDE 180 UG/1
AEROSOL, POWDER RESPIRATORY (INHALATION) AS NEEDED
COMMUNITY
Start: 2022-11-21

## 2022-12-05 RX ORDER — ONDANSETRON HYDROCHLORIDE 8 MG/1
TABLET, FILM COATED ORAL EVERY 8 HOURS
COMMUNITY
Start: 2022-08-02

## 2022-12-05 NOTE — PROGRESS NOTES
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    PROBLEM LIST:   1. Bicuspid aortic valve with combined aortic stenosis/aortic insufficiency.  1. In 2005, remote onset of auscultated murmur by primary care physician with ELVIRA per Dr. Ortiz with bicuspid findings.   2. 9/2016 #25 AVR On X Dacron tube  graft & root replacement  w coronary reimplantation-Dr Bazzi  1. periop tamponade requiring window  3. 11/17 echo acceptable AVR , EF 60%  4. 11/2020 echo: EF 60%, concentric LVH, normal function mechanical AV  2. Atypical chest pain.  1. On 4/16/2012, GXT stress test revealing 12.8 MET exercise. T-wave inversion in inferior leads and in leads 4 - leads 6 and no exercise induced arrhythmias.  3. Palpitations.   1. On 4/17/2012, 24-hour Holter monitor revealing heart rate ranging from  beats per minute with 56 PVCs, no pauses.  4. HLD  1. 11/21/2019 lipids:   HDL 36   2. 10/21 164/162/48/88  5. Childhood asthma.  6. G2, P3      CC:   Chief Complaint   Patient presents with   • Nonrheumatic aortic valve stenosis       Allergies  Allergies   Allergen Reactions   • Flagyl [Metronidazole] Itching       Current Medications    Current Outpatient Medications:   •  fexofenadine (ALLEGRA) 180 MG tablet, Take 180 mg by mouth Daily As Needed. For allergies, Disp: , Rfl: 2  •  fluticasone (FLONASE) 50 MCG/ACT nasal spray, 1 spray into the nostril(s) as directed by provider 2 (Two) Times a Day As Needed., Disp: , Rfl: 0  •  melatonin 5 MG tablet tablet, Take 5 mg by mouth Daily., Disp: , Rfl:   •  Multiple Vitamins-Minerals (MULTI ADULT GUMMIES PO), Take  by mouth Daily., Disp: , Rfl:   •  ondansetron (ZOFRAN) 8 MG tablet, Take  by mouth Every 8 (Eight) Hours.,  "Disp: , Rfl:   •  Pulmicort Flexhaler 180 MCG/ACT inhaler, As Needed., Disp: , Rfl:   •  sertraline (ZOLOFT) 50 MG tablet, Daily., Disp: , Rfl:   •  warfarin (COUMADIN) 2.5 MG tablet, Take one to two tablets by mouth daily or as directed by the anticoagulation clinic., Disp: 180 tablet, Rfl: 1  •  warfarin (COUMADIN) 5 MG tablet, Take 1 to 1.5 tablets by mouth daily or as directed by anticoagulation clinic, Disp: 30 tablet, Rfl: 0      History of Present Illness   Deisi Queen is a 37 y.o. year old female here for follow up.  Walks 7-10,000 steps daily.  Has had no issues cardiac standpoint.      OBJECTIVE:  Vitals:    12/05/22 1115   BP: 132/82   BP Location: Right arm   Patient Position: Sitting   Pulse: 75   SpO2: 97%   Weight: 84.9 kg (187 lb 3.2 oz)   Height: 157.5 cm (62\")     Body mass index is 34.24 kg/m².    Constitutional:       Appearance: Healthy appearance. Not in distress.   Neck:      Vascular: No JVR. JVD normal.   Pulmonary:      Effort: Pulmonary effort is normal.      Breath sounds: Normal breath sounds. No wheezing. No rhonchi. No rales.   Chest:      Chest wall: Not tender to palpatation.   Cardiovascular:      PMI at left midclavicular line. Normal rate. Regular rhythm. Normal S1. Normal S2.      Murmurs: There is no murmur.      No gallop. Systolic ejection click. No rub.   Pulses:     Intact distal pulses.   Edema:     Peripheral edema absent.   Abdominal:      General: Bowel sounds are normal.      Palpations: Abdomen is soft.      Tenderness: There is no abdominal tenderness.   Musculoskeletal: Normal range of motion.         General: No tenderness. Skin:     General: Skin is warm and dry.   Neurological:      General: No focal deficit present.      Mental Status: Alert and oriented to person, place and time.         Diagnostic Data:    ECG 12 Lead    Date/Time: 12/5/2022 11:36 AM  Performed by: Niels Klein MD  Authorized by: Niels Klein MD   Comparison: compared with previous " ECG from 10/19/2016  Rhythm: sinus rhythm  BPM: 75  T inversion: all    Clinical impression: abnormal EKG              ASSESSMENT:   Diagnosis Plan   1. Nonrheumatic aortic valve stenosis            PLAN:  Bicuspid aortic valve status post mechanical AVR acceptable function per echocardiogram asymptomatic continue yearly follow-up echocardiogram  next year          Niels Klein MD, [facility]

## 2023-01-16 ENCOUNTER — TELEPHONE (OUTPATIENT)
Dept: PHARMACY | Facility: HOSPITAL | Age: 38
End: 2023-01-16

## 2023-02-03 ENCOUNTER — LAB (OUTPATIENT)
Dept: LAB | Facility: HOSPITAL | Age: 38
End: 2023-02-03
Payer: COMMERCIAL

## 2023-02-03 DIAGNOSIS — Z95.2 HX OF AORTIC VALVE REPLACEMENT, MECHANICAL: ICD-10-CM

## 2023-02-03 LAB
INR PPP: 2.45 (ref 0.9–1.1)
PROTHROMBIN TIME: 27.3 SECONDS (ref 12.1–14.7)

## 2023-02-03 PROCEDURE — 36415 COLL VENOUS BLD VENIPUNCTURE: CPT

## 2023-02-03 PROCEDURE — 85610 PROTHROMBIN TIME: CPT

## 2023-02-03 RX ORDER — WARFARIN SODIUM 5 MG/1
TABLET ORAL
Qty: 45 TABLET | Refills: 0 | Status: SHIPPED | OUTPATIENT
Start: 2023-02-03 | End: 2023-03-15 | Stop reason: SDUPTHER

## 2023-02-06 ENCOUNTER — ANTICOAGULATION VISIT (OUTPATIENT)
Dept: PHARMACY | Facility: HOSPITAL | Age: 38
End: 2023-02-06
Payer: COMMERCIAL

## 2023-02-06 DIAGNOSIS — Z95.2 HX OF AORTIC VALVE REPLACEMENT, MECHANICAL: Primary | ICD-10-CM

## 2023-02-06 NOTE — PROGRESS NOTES
Anticoagulation Clinic - Remote Progress Note  REMOTE LAB    Indication: mechanical AVR (On-X)  Referring Provider: Latoya (Last seen: 11/21 next appt: 12/2022)  Goal INR: 1.5-2.5  Current Drug Interactions: aspirin; melatonin    Diet: brussel sprouts or equivalent 1x/week (7/8/2022)  Alcohol: none  Tobacco: none  OTC Pain Medication: APAP PRN    INR History:  Date 11/21 12/19 1/24/20 2/20 3/13 4/30 5/28 6/19 7/16 9/3 11/4 11/27   Total WeeklyDose 40mg 40mg 40mg 40mg 40mg 40mg 40mg 40mg 40mg 40mg 40mg 40mg   INR 1.94 1.99 1.96 2.16 1.86 2.62 2.68 1.21 1.78 1.71 1.46 1.93   Notes  amox x7d     decr GLV? incr GLV   overdue overdue; rcvd 11/30     Date 1/14/21 2/19 4/7 5/13 5/28 6/9 7/14 8/30 9/13 9/20 10/1 10/4   Total WeeklyDose 40mg 40mg 40mg 40mg 40mg 40mg 40mg 40mg 40mg 42.5mg 37.5mg 40mg   INR 1.97 2.26 2.11 1.47 1.56 2.19 1.49 1.34 1.46 1.39 1.19 1.24   Notes overdue; rec'd 1/15          1x miss enox     Date 10/7 10/11 10/20 11/24 12/7 1/4/22  3/4 3/11 4/21  7/8   Total WeeklyDose 52.5mg 55mg 55mg 55mg non- compliant 52.5mg non- compliant 52.5mg 50mg 52.5mg  52.5mg   INR 1.59 1.57 1.91 2.87   1.69  2.95 2.05 1.52  2.69   Notes  Zero GLV  rcv'd 11/29  recv'd 1/5   1x decr dose   zoloft initiation       Date 7/19 8/10 9/20 12/1 2/3          Total WeeklyDose 50mg 50mg 47.5 mg 47.5 mg           INR 2.18 2.66 2.08 2.59 2.45          Notes    Due 10/4 rec 2/6          Phone Interview:  Tablet Strength: 2.5 mg and 5 mg tablets  Patient Contact Info: [phone]  Lab Contact Info: [facility]    Patient Findings      Negatives:  Signs/symptoms of thrombosis, Signs/symptoms of bleeding, Laboratory test error suspected, Change in health, Change in alcohol use, Change in activity, Upcoming invasive procedure, Emergency department visit, Upcoming dental procedure, Missed doses, Extra doses, Change in medications, Change in diet/appetite, Hospital admission, Bruising, Other complaints           Plan:     1. INR is  "therapeutic today at 2.45 (goal 1.5-2.5). Instructed Ms. Queen to continue warfarin 7.5mg oral daily except 5mg WedSat until recheck (47.5mg/week)  2. Repeat INR in 4 weeks, 3/3  3. Messaged Dr Klein RN for prophylactic abx-- per ADELIA Conde \"[facility] only gives abx for artificial valve pts with hx of endocarditis\"-- Patient reports she has a a cleaning 8/12 and plans to call Dr Latoya KENNEDY to discuss  4.. Verbal information provided over the phone. Deisi Queen RBV dosing instructions, expresses understanding by teach back, and has no further questions at this time.        Paulette Gonzalez, PharmD.  02/06/23   10:19 EST      "

## 2023-03-01 RX ORDER — WARFARIN SODIUM 2.5 MG/1
TABLET ORAL
Qty: 30 TABLET | Refills: 1 | Status: SHIPPED | OUTPATIENT
Start: 2023-03-01 | End: 2023-03-15 | Stop reason: SDUPTHER

## 2023-03-13 ENCOUNTER — TELEPHONE (OUTPATIENT)
Dept: PHARMACY | Facility: HOSPITAL | Age: 38
End: 2023-03-13

## 2023-03-15 ENCOUNTER — ANTICOAGULATION VISIT (OUTPATIENT)
Dept: PHARMACY | Facility: HOSPITAL | Age: 38
End: 2023-03-15
Payer: COMMERCIAL

## 2023-03-15 ENCOUNTER — LAB (OUTPATIENT)
Dept: LAB | Facility: HOSPITAL | Age: 38
End: 2023-03-15
Payer: COMMERCIAL

## 2023-03-15 DIAGNOSIS — Z95.2 HX OF AORTIC VALVE REPLACEMENT, MECHANICAL: ICD-10-CM

## 2023-03-15 DIAGNOSIS — Z95.2 HX OF AORTIC VALVE REPLACEMENT, MECHANICAL: Primary | ICD-10-CM

## 2023-03-15 LAB
INR PPP: 3.52 (ref 0.9–1.1)
PROTHROMBIN TIME: 36.4 SECONDS (ref 12.1–14.7)

## 2023-03-15 PROCEDURE — 85610 PROTHROMBIN TIME: CPT

## 2023-03-15 PROCEDURE — 36415 COLL VENOUS BLD VENIPUNCTURE: CPT

## 2023-03-15 RX ORDER — WARFARIN SODIUM 5 MG/1
TABLET ORAL
Qty: 45 TABLET | Refills: 1 | Status: SHIPPED | OUTPATIENT
Start: 2023-03-15

## 2023-03-15 RX ORDER — WARFARIN SODIUM 2.5 MG/1
TABLET ORAL
Qty: 30 TABLET | Refills: 1 | Status: SHIPPED | OUTPATIENT
Start: 2023-03-15

## 2023-03-15 NOTE — PROGRESS NOTES
Anticoagulation Clinic - Remote Progress Note  REMOTE LAB    Indication: mechanical AVR (On-X)  Referring Provider: Latoya (Last seen: 11/21 next appt: 12/2022)  Goal INR: 1.5-2.5  Current Drug Interactions: aspirin; melatonin    Diet: brussel sprouts or equivalent 1x/week (7/8/2022)  Alcohol: none  Tobacco: none  OTC Pain Medication: APAP PRN    INR History:  Date 11/21 12/19 1/24/20 2/20 3/13 4/30 5/28 6/19 7/16 9/3 11/4 11/27   Total WeeklyDose 40mg 40mg 40mg 40mg 40mg 40mg 40mg 40mg 40mg 40mg 40mg 40mg   INR 1.94 1.99 1.96 2.16 1.86 2.62 2.68 1.21 1.78 1.71 1.46 1.93   Notes  amox x7d     decr GLV? incr GLV   overdue overdue; rcvd 11/30     Date 1/14/21 2/19 4/7 5/13 5/28 6/9 7/14 8/30 9/13 9/20 10/1 10/4   Total WeeklyDose 40mg 40mg 40mg 40mg 40mg 40mg 40mg 40mg 40mg 42.5mg 37.5mg 40mg   INR 1.97 2.26 2.11 1.47 1.56 2.19 1.49 1.34 1.46 1.39 1.19 1.24   Notes overdue; rec'd 1/15          1x miss enox     Date 10/7 10/11 10/20 11/24 12/7 1/4/22  3/4 3/11 4/21  7/8   Total WeeklyDose 52.5mg 55mg 55mg 55mg non- compliant 52.5mg non- compliant 52.5mg 50mg 52.5mg  52.5mg   INR 1.59 1.57 1.91 2.87   1.69  2.95 2.05 1.52  2.69   Notes  Zero GLV  rcv'd 11/29  recv'd 1/5   1x decr dose   zoloft initiation       Date 7/19 8/10 9/20 12/1 2/3 3/15         Total WeeklyDose 50mg 50mg 47.5 mg 47.5 mg  47.5 mg         INR 2.18 2.66 2.08 2.59 2.45 3.52         Notes    Due 10/4 rec 2/6          Phone Interview:  Tablet Strength: 2.5 mg and 5 mg tablets  Patient Contact Info: [phone]  Lab Contact Info: [facility]    Patient Findings    Negatives:  Signs/symptoms of thrombosis, Signs/symptoms of bleeding, Laboratory test error suspected, Change in health, Change in alcohol use, Change in activity, Upcoming invasive procedure, Emergency department visit, Upcoming dental procedure, Missed doses, Extra doses, Change in medications, Change in diet/appetite, Hospital admission, Bruising, Other complaints   Comments:  She has  "been dealing with migraines lately, was taking APAP but none in 4-5 days   Denies any changes in diet or medications     Asked about interactions with Ozempic and Wegovy as she thought about starting.  No concrete plans at this time, will let clinic know if she starts         Plan:     1. INR is SUPRAtherapeutic today at 3.52 (goal 1.5-2.5). Instructed Ms. Queen to  decrease today's dose to 2.5mg and tomorrows to 5mg, then continue warfarin 7.5mg oral daily except 5mg WedSat until recheck (10% decrease)  2. Repeat INR in 1 week to ensure WNL, then could push back out  3. Messaged Dr Klein RN for prophylactic abx-- per ADELIA Conde \"[facility] only gives abx for artificial valve pts with hx of endocarditis\"-- Patient reports she has a a cleaning 8/12 and plans to call Dr Klein RN to discuss  4.. Verbal information provided over the phone. Deisi L Bret RBV dosing instructions, expresses understanding by teach back, and has no further questions at this time.    Lillian Pan, PharmD, BCPS   3/15/2023  13:52 EDT          "

## 2023-04-13 ENCOUNTER — TELEPHONE (OUTPATIENT)
Dept: PHARMACY | Facility: HOSPITAL | Age: 38
End: 2023-04-13

## 2023-04-17 ENCOUNTER — LAB (OUTPATIENT)
Dept: LAB | Facility: HOSPITAL | Age: 38
End: 2023-04-17

## 2023-04-17 DIAGNOSIS — Z95.2 HX OF AORTIC VALVE REPLACEMENT, MECHANICAL: ICD-10-CM

## 2023-04-17 LAB
INR PPP: 1.45 (ref 0.9–1.1)
PROTHROMBIN TIME: 18.2 SECONDS (ref 12.1–14.7)

## 2023-04-17 PROCEDURE — 85610 PROTHROMBIN TIME: CPT

## 2023-04-17 PROCEDURE — 36415 COLL VENOUS BLD VENIPUNCTURE: CPT

## 2023-04-18 ENCOUNTER — ANTICOAGULATION VISIT (OUTPATIENT)
Dept: PHARMACY | Facility: HOSPITAL | Age: 38
End: 2023-04-18
Payer: COMMERCIAL

## 2023-04-18 DIAGNOSIS — Z95.2 HX OF AORTIC VALVE REPLACEMENT, MECHANICAL: Primary | ICD-10-CM

## 2023-04-18 NOTE — PROGRESS NOTES
Anticoagulation Clinic - Remote Progress Note  REMOTE LAB    Indication: mechanical AVR (On-X)  Referring Provider: Latoya (Last seen: 11/21 next appt: 12/2022)  Goal INR: 1.5-2.5  Current Drug Interactions: aspirin; melatonin    Diet: brussel sprouts or equivalent 1x/week (7/8/2022)  Alcohol: none  Tobacco: none  OTC Pain Medication: APAP PRN    INR History:  Date 11/21 12/19 1/24/20 2/20 3/13 4/30 5/28 6/19 7/16 9/3 11/4 11/27   Total WeeklyDose 40mg 40mg 40mg 40mg 40mg 40mg 40mg 40mg 40mg 40mg 40mg 40mg   INR 1.94 1.99 1.96 2.16 1.86 2.62 2.68 1.21 1.78 1.71 1.46 1.93   Notes  amox x7d     decr GLV? incr GLV   overdue overdue; rcvd 11/30     Date 1/14/21 2/19 4/7 5/13 5/28 6/9 7/14 8/30 9/13 9/20 10/1 10/4   Total WeeklyDose 40mg 40mg 40mg 40mg 40mg 40mg 40mg 40mg 40mg 42.5mg 37.5mg 40mg   INR 1.97 2.26 2.11 1.47 1.56 2.19 1.49 1.34 1.46 1.39 1.19 1.24   Notes overdue; rec'd 1/15          1x miss enox     Date 10/7 10/11 10/20 11/24 12/7 1/4/22  3/4 3/11 4/21  7/8   Total WeeklyDose 52.5mg 55mg 55mg 55mg non- compliant 52.5mg non- compliant 52.5mg 50mg 52.5mg  52.5mg   INR 1.59 1.57 1.91 2.87   1.69  2.95 2.05 1.52  2.69   Notes  Zero GLV  rcv'd 11/29  recv'd 1/5   1x decr dose   zoloft initiation       Date 7/19 8/10 9/20 12/1 2/3 3/15  4/18       Total WeeklyDose 50mg 50mg 47.5 mg 47.5 mg  47.5 mg overdue 47.5mg       INR 2.18 2.66 2.08 2.59 2.45 3.52  1.45       Notes    Due 10/4 rec 2/6 zoloft increase  Miss?       Phone Interview:  Tablet Strength: 2.5 mg and 5 mg tablets  Patient Contact Info: [phone]  Lab Contact Info: [facility]    Patient Findings    Negatives:  Signs/symptoms of thrombosis, Signs/symptoms of bleeding, Laboratory test error suspected, Change in health, Change in alcohol use, Change in activity, Upcoming invasive procedure, Emergency department visit, Upcoming dental procedure, Missed doses, Extra doses, Change in medications, Change in diet/appetite, Hospital admission,  "Bruising, Other complaints   Comments:  Zoloft increased prior to last encounter; possible she missed a dose in the last week but she is unsure       Plan:     1. INR is SUBtherapeutic at 1.45 (goal 1.5-2.5). Instructed Ms. Queen to BOOST tonight's dose to 10mg then continue warfarin 7.5mg oral daily except 5mg WedSat until recheck (10% decrease)  2. Repeat INR in 2 weeks  3. Messaged Dr Klein RN for prophylactic abx-- per ADELIA Conde \"[facility] only gives abx for artificial valve pts with hx of endocarditis\"-- Patient reports she has a a cleaning 8/12 and plans to call Dr Klein RN to discuss  4.. Verbal information provided over the phone. Deisi Queen RBV dosing instructions, expresses understanding by teach back, and has no further questions at this time.      Paulette Gonzalez, NeshaD.  04/18/23   08:32 EDT          "

## 2023-05-04 ENCOUNTER — LAB (OUTPATIENT)
Dept: LAB | Facility: HOSPITAL | Age: 38
End: 2023-05-04

## 2023-05-04 ENCOUNTER — ANTICOAGULATION VISIT (OUTPATIENT)
Dept: PHARMACY | Facility: HOSPITAL | Age: 38
End: 2023-05-04
Payer: COMMERCIAL

## 2023-05-04 DIAGNOSIS — Z95.2 HX OF AORTIC VALVE REPLACEMENT, MECHANICAL: ICD-10-CM

## 2023-05-04 DIAGNOSIS — Z95.2 HX OF AORTIC VALVE REPLACEMENT, MECHANICAL: Primary | ICD-10-CM

## 2023-05-04 LAB
INR PPP: 2.25 (ref 0.9–1.1)
PROTHROMBIN TIME: 25.6 SECONDS (ref 12.1–14.7)

## 2023-05-04 PROCEDURE — 36415 COLL VENOUS BLD VENIPUNCTURE: CPT

## 2023-05-04 PROCEDURE — 85610 PROTHROMBIN TIME: CPT

## 2023-05-04 RX ORDER — WARFARIN SODIUM 5 MG/1
TABLET ORAL
Qty: 45 TABLET | Refills: 1 | Status: SHIPPED | OUTPATIENT
Start: 2023-05-04

## 2023-05-04 RX ORDER — WARFARIN SODIUM 2.5 MG/1
TABLET ORAL
Qty: 30 TABLET | Refills: 1 | Status: SHIPPED | OUTPATIENT
Start: 2023-05-04

## 2023-05-04 NOTE — PROGRESS NOTES
Anticoagulation Clinic - Remote Progress Note  REMOTE LAB    Indication: mechanical AVR (On-X)  Referring Provider: Latoya (Last seen: 11/21 next appt: 12/2022)  Goal INR: 1.5-2.5  Current Drug Interactions: aspirin; melatonin    Diet: brussel sprouts or equivalent 1x/week (7/8/2022)  Alcohol: none  Tobacco: none  OTC Pain Medication: APAP PRN    INR History:  Date 11/21 12/19 1/24/20 2/20 3/13 4/30 5/28 6/19 7/16 9/3 11/4 11/27   Total WeeklyDose 40mg 40mg 40mg 40mg 40mg 40mg 40mg 40mg 40mg 40mg 40mg 40mg   INR 1.94 1.99 1.96 2.16 1.86 2.62 2.68 1.21 1.78 1.71 1.46 1.93   Notes  amox x7d     decr GLV? incr GLV   overdue overdue; rcvd 11/30     Date 1/14/21 2/19 4/7 5/13 5/28 6/9 7/14 8/30 9/13 9/20 10/1 10/4   Total WeeklyDose 40mg 40mg 40mg 40mg 40mg 40mg 40mg 40mg 40mg 42.5mg 37.5mg 40mg   INR 1.97 2.26 2.11 1.47 1.56 2.19 1.49 1.34 1.46 1.39 1.19 1.24   Notes overdue; rec'd 1/15          1x miss enox     Date 10/7 10/11 10/20 11/24 12/7 1/4/22  3/4 3/11 4/21  7/8   Total WeeklyDose 52.5mg 55mg 55mg 55mg non- compliant 52.5mg non- compliant 52.5mg 50mg 52.5mg  52.5mg   INR 1.59 1.57 1.91 2.87   1.69  2.95 2.05 1.52  2.69   Notes  Zero GLV  rcv'd 11/29  recv'd 1/5   1x decr dose   zoloft initiation       Date 7/19 8/10 9/20 12/1 2/3 3/15  4/18 5/4      Total WeeklyDose 50mg 50mg 47.5 mg 47.5 mg  47.5 mg overdue 47.5mg 47.5 mg       INR 2.18 2.66 2.08 2.59 2.45 3.52  1.45 2.25      Notes    Due 10/4 rec 2/6 zoloft increase  Miss?       Phone Interview:  Tablet Strength: 2.5 mg and 5 mg tablets  Patient Contact Info: [phone]  Lab Contact Info: [facility]    Patient Findings    Positives:  Change in medications   Negatives:  Signs/symptoms of thrombosis, Signs/symptoms of bleeding, Laboratory test error suspected, Change in health, Change in alcohol use, Change in activity, Upcoming invasive procedure, Emergency department visit, Upcoming dental procedure, Missed doses, Extra doses, Change in diet/appetite,  "Hospital admission, Bruising, Other complaints   Comments:  Has started Victoza since last visit (no DDI with warfarin), otherwise patient denies all findings.       Plan:     1. INR is therapeutic at 2.25 (goal 1.5-2.5). Instructed Ms. Queen to continue warfarin 7.5mg oral daily except 5mg WedSat until recheck   2. Repeat INR in 4 weeks, 6/1/23   3. Messaged Dr Klein RN for prophylactic abx-- per ADELIA Conde \"[facility] only gives abx for artificial valve pts with hx of endocarditis\"-- Patient reports she has a a cleaning 8/12 and plans to call Dr Klein RN to discuss  4.. Verbal information provided over the phone. Deisi Queen RBV dosing instructions, expresses understanding by teach back, and has no further questions at this time.    Chris Marcelino,  PharmD  05/04/23  13:20 EDT            "

## 2023-06-08 ENCOUNTER — TELEPHONE (OUTPATIENT)
Dept: PHARMACY | Facility: HOSPITAL | Age: 38
End: 2023-06-08

## 2023-07-24 DIAGNOSIS — Z95.2 HX OF AORTIC VALVE REPLACEMENT, MECHANICAL: Primary | ICD-10-CM

## 2023-07-25 RX ORDER — WARFARIN SODIUM 2.5 MG/1
TABLET ORAL
Qty: 30 TABLET | Refills: 1 | Status: SHIPPED | OUTPATIENT
Start: 2023-07-25

## 2023-07-25 RX ORDER — WARFARIN SODIUM 5 MG/1
TABLET ORAL
Qty: 45 TABLET | Refills: 1 | Status: SHIPPED | OUTPATIENT
Start: 2023-07-25

## 2023-07-26 ENCOUNTER — ANTICOAGULATION VISIT (OUTPATIENT)
Dept: PHARMACY | Facility: HOSPITAL | Age: 38
End: 2023-07-26

## 2023-07-26 DIAGNOSIS — Z95.2 HX OF AORTIC VALVE REPLACEMENT, MECHANICAL: Primary | ICD-10-CM

## 2023-07-26 LAB — INR PPP: 2.4

## 2023-07-26 NOTE — PROGRESS NOTES
Anticoagulation Clinic - Remote Progress Note  REMOTE LAB    Indication: mechanical AVR (On-X)  Referring Provider: Latoya (Last seen: 11/21 next appt: 12/2022)  Goal INR: 1.5-2.5  Current Drug Interactions: aspirin; melatonin    Diet: brussel sprouts or equivalent 1x/week (7/8/2022)  Alcohol: none  Tobacco: none  OTC Pain Medication: APAP PRN    INR History:  Date 11/21 12/19 1/24/20 2/20 3/13 4/30 5/28 6/19 7/16 9/3 11/4 11/27   Total WeeklyDose 40mg 40mg 40mg 40mg 40mg 40mg 40mg 40mg 40mg 40mg 40mg 40mg   INR 1.94 1.99 1.96 2.16 1.86 2.62 2.68 1.21 1.78 1.71 1.46 1.93   Notes  amox x7d     decr GLV? incr GLV   overdue overdue; rcvd 11/30     Date 1/14/21 2/19 4/7 5/13 5/28 6/9 7/14 8/30 9/13 9/20 10/1 10/4   Total WeeklyDose 40mg 40mg 40mg 40mg 40mg 40mg 40mg 40mg 40mg 42.5mg 37.5mg 40mg   INR 1.97 2.26 2.11 1.47 1.56 2.19 1.49 1.34 1.46 1.39 1.19 1.24   Notes overdue; rec'd 1/15          1x miss enox     Date 10/7 10/11 10/20 11/24 12/7 1/4/22  3/4 3/11 4/21  7/8   Total WeeklyDose 52.5mg 55mg 55mg 55mg non- compliant 52.5mg non- compliant 52.5mg 50mg 52.5mg  52.5mg   INR 1.59 1.57 1.91 2.87   1.69  2.95 2.05 1.52  2.69   Notes  Zero GLV  rcv'd 11/29  recv'd 1/5   1x decr dose   zoloft initiation       Date 7/19 8/10 9/20 12/1 2/3 3/15  4/18 5/4  7/25    Total WeeklyDose 50mg 50mg 47.5 mg 47.5 mg  47.5 mg overdue 47.5mg 47.5 mg  overdue 47.5 mg    INR 2.18 2.66 2.08 2.59 2.45 3.52  1.45 2.25  2.4    Notes    Due 10/4 rec 2/6 zoloft increase  Miss?   Rec 7/26    Phone Interview:  Tablet Strength: 2.5 mg and 5 mg tablets  Patient Contact Info: [phone]  Lab Contact Info: [facility]    Patient Findings    Negatives: Signs/symptoms of thrombosis, Signs/symptoms of bleeding, Laboratory test error suspected, Change in health, Change in alcohol use, Change in activity, Upcoming invasive procedure, Emergency department visit, Upcoming dental procedure, Missed doses, Extra doses, Change in medications, Change in  "diet/appetite, Hospital admission, Bruising, Other complaints   Comments: All findings negative per pt.     Plan:     1. INR was therapeutic yesterday at 2.40 (goal 1.5-2.5). Instructed Ms. Queen to continue warfarin 7.5mg oral daily except 5mg WedSat until recheck   2. Repeat INR in 4 weeks, 8/22/23. Advised pt on compliance.  3. Messaged Dr Klein RN for prophylactic abx-- per ADELIA Conde \"[facility] only gives abx for artificial valve pts with hx of endocarditis\"-- Patient reports she has a a cleaning 8/12 and plans to call Dr Klein RN to discuss  4.. Verbal information provided over the phone. Deisi Queen RBV dosing instructions, expresses understanding by teach back, and has no further questions at this time.    Cem Navas, [place]  7/26/2023  12:06 EDT     I, Deven Munguia, PharmD, have reviewed the note in full and agree with the assessment and plan.  07/26/23  13:12 EDT    "

## 2023-08-21 LAB — INR PPP: 3.32

## 2023-08-22 ENCOUNTER — ANTICOAGULATION VISIT (OUTPATIENT)
Dept: PHARMACY | Facility: HOSPITAL | Age: 38
End: 2023-08-22

## 2023-08-22 DIAGNOSIS — Z95.2 HX OF AORTIC VALVE REPLACEMENT, MECHANICAL: Primary | ICD-10-CM

## 2023-08-31 LAB — INR PPP: 2.12

## 2023-09-01 ENCOUNTER — ANTICOAGULATION VISIT (OUTPATIENT)
Dept: PHARMACY | Facility: HOSPITAL | Age: 38
End: 2023-09-01

## 2023-09-01 DIAGNOSIS — Z95.2 HX OF AORTIC VALVE REPLACEMENT, MECHANICAL: Primary | ICD-10-CM

## 2023-09-01 NOTE — PROGRESS NOTES
Anticoagulation Clinic - Remote Progress Note  REMOTE LAB    Indication: mechanical AVR (On-X)  Referring Provider: Latoya (Last seen: 11/21 next appt: 12/2022)  Goal INR: 1.5-2.5  Current Drug Interactions: aspirin; melatonin    Diet: brussel sprouts or equivalent 1x/week (8/22/2023)  Alcohol: none  Tobacco: none  OTC Pain Medication: APAP PRN    INR History:  Date 11/21 12/19 1/24/20 2/20 3/13 4/30 5/28 6/19 7/16 9/3 11/4 11/27   Total WeeklyDose 40mg 40mg 40mg 40mg 40mg 40mg 40mg 40mg 40mg 40mg 40mg 40mg   INR 1.94 1.99 1.96 2.16 1.86 2.62 2.68 1.21 1.78 1.71 1.46 1.93   Notes  amox x7d     decr GLV? incr GLV   overdue overdue; rcvd 11/30     Date 1/14/21 2/19 4/7 5/13 5/28 6/9 7/14 8/30 9/13 9/20 10/1 10/4   Total WeeklyDose 40mg 40mg 40mg 40mg 40mg 40mg 40mg 40mg 40mg 42.5mg 37.5mg 40mg   INR 1.97 2.26 2.11 1.47 1.56 2.19 1.49 1.34 1.46 1.39 1.19 1.24   Notes overdue; rec'd 1/15          1x miss enox     Date 10/7 10/11 10/20 11/24 12/7 1/4/22  3/4 3/11 4/21  7/8   Total WeeklyDose 52.5mg 55mg 55mg 55mg non- compliant 52.5mg non- compliant 52.5mg 50mg 52.5mg  52.5mg   INR 1.59 1.57 1.91 2.87   1.69  2.95 2.05 1.52  2.69   Notes  Zero GLV  rcv'd 11/29  recv'd 1/5   1x decr dose   zoloft initiation       Date 7/19 8/10 9/20 12/1 2/3 3/15  4/18 5/4  7/25 8/21   Total WeeklyDose 50mg 50mg 47.5 mg 47.5 mg  47.5 mg overdue 47.5mg 47.5 mg  overdue 47.5 mg 47.5 mg   INR 2.18 2.66 2.08 2.59 2.45 3.52  1.45 2.25  2.4 3.32   Notes    Due 10/4 rec 2/6 zoloft increase  Miss?   Rec 7/26 Rec  8/22     Date 8/31              Total WeeklyDose 47.5 mg              INR 2.12              Notes Rec'd 9/1                  Phone Interview:  Tablet Strength: 2.5 mg and 5 mg tablets  Patient Contact Info: [phone]  Lab Contact Info: [facility]    Patient Findings    Negatives: Signs/symptoms of thrombosis, Signs/symptoms of bleeding, Laboratory test error suspected, Change in health, Change in alcohol use, Change in activity,  Upcoming invasive procedure, Emergency department visit, Upcoming dental procedure, Missed doses, Extra doses, Change in medications, Change in diet/appetite, Hospital admission, Bruising, Other complaints   Comments: Patient could not recall if she takes a 7.5 mg dose on Saturday or if she takes it on Sunday but was confident that it was written down and that she has been consistent and compliant.       Plan:     1. INR was therapeutic yesterday at 2.12 (goal 1.5-2.5). Instructed Ms. Queen to continue warfarin 7.5mg oral daily except 5mg WedSat until recheck . Patient does not need refills at this time.  2. Repeat INR in 2 weeks, 9/14/23.  3. Verbal information provided over the phone. Deisi Queen RBV dosing instructions, expresses understanding by teach back, and has no further questions at this time.    Carlos Méndez, Pharmacy Intern  09/01/23  11:27 EDT     I, Paulette Gonzalez, PharmD, have reviewed the note in full and agree with the assessment and plan.  09/01/23  12:12 EDT

## 2023-09-19 LAB — INR PPP: 3.48

## 2023-09-20 ENCOUNTER — ANTICOAGULATION VISIT (OUTPATIENT)
Dept: PHARMACY | Facility: HOSPITAL | Age: 38
End: 2023-09-20

## 2023-09-20 DIAGNOSIS — Z95.2 HX OF AORTIC VALVE REPLACEMENT, MECHANICAL: Primary | ICD-10-CM

## 2023-09-20 NOTE — PROGRESS NOTES
Anticoagulation Clinic - Remote Progress Note  REMOTE LAB    Indication: mechanical AVR (On-X)  Referring Provider: Latoya (Last seen: 11/21 next appt: 12/2022)  Goal INR: 1.5-2.5  Current Drug Interactions: aspirin; melatonin    Diet: Brussel sprouts or equivalent 1x/week (9/20/23)  Alcohol: None  Tobacco: None  OTC Pain Medication: APAP PRN    INR History:  Date 11/21 12/19 1/24/20 2/20 3/13 4/30 5/28 6/19 7/16 9/3 11/4 11/27   Total WeeklyDose 40mg 40mg 40mg 40mg 40mg 40mg 40mg 40mg 40mg 40mg 40mg 40mg   INR 1.94 1.99 1.96 2.16 1.86 2.62 2.68 1.21 1.78 1.71 1.46 1.93   Notes  amox x7d     decr GLV? incr GLV   overdue overdue; rcvd 11/30     Date 1/14/21 2/19 4/7 5/13 5/28 6/9 7/14 8/30 9/13 9/20 10/1 10/4   Total WeeklyDose 40mg 40mg 40mg 40mg 40mg 40mg 40mg 40mg 40mg 42.5mg 37.5mg 40mg   INR 1.97 2.26 2.11 1.47 1.56 2.19 1.49 1.34 1.46 1.39 1.19 1.24   Notes overdue; rec'd 1/15          1x miss enox     Date 10/7 10/11 10/20 11/24 12/7 1/4/22  3/4 3/11 4/21  7/8   Total WeeklyDose 52.5mg 55mg 55mg 55mg non- compliant 52.5mg non- compliant 52.5mg 50mg 52.5mg  52.5mg   INR 1.59 1.57 1.91 2.87   1.69  2.95 2.05 1.52  2.69   Notes  Zero GLV  rcv'd 11/29  recv'd 1/5   1x decr dose   zoloft initiation       Date 7/19 8/10 9/20 12/1 2/3 3/15  4/18 5/4  7/25 8/21   Total WeeklyDose 50mg 50mg 47.5 mg 47.5 mg  47.5 mg overdue 47.5mg 47.5 mg  overdue 47.5 mg 47.5 mg   INR 2.18 2.66 2.08 2.59 2.45 3.52  1.45 2.25  2.4 3.32   Notes    Due 10/4 rec 2/6 zoloft increase  Miss?   Rec 7/26 Rec  8/22     Date 8/31 9/19             Total WeeklyDose 47.5 mg 47.5 mg              INR 2.12 3.48             Notes Rec'd 9/1 Rec'd 9/20  Wine                  Phone Interview:  Tablet Strength: 2.5 mg and 5 mg tablets  Patient Contact Info: [phone]  Lab Contact Info: [facility]    Patient Findings    Positives: Change in alcohol use   Negatives: Signs/symptoms of thrombosis, Signs/symptoms of bleeding, Laboratory test error  suspected, Change in health, Change in activity, Upcoming invasive procedure, Emergency department visit, Upcoming dental procedure, Missed doses, Extra doses, Change in medications, Change in diet/appetite, Hospital admission, Bruising, Other complaints   Comments: Ms. Queen state that she has ~2 glasses of wine this weekend which is not normal for her. She denies all the other above listed findings.       Plan:     1. INR was Supratherapeutic yesterday at 3.48 (goal 1.5-2.5). Instructed Ms. Queen to decrease this evening's dose to warfarin 2.5 mg and then continue warfarin 7.5mg oral daily except 5mg WedSat until recheck .  2. Repeat INR in 2 weeks, 10/3.  3. Verbal information provided over the phone. Deisi Queen RBV dosing instructions, expresses understanding by teach back, and has no further questions at this time.    Adriana Sun [facility]  09/20/23  08:24 EDT

## 2023-10-10 ENCOUNTER — ANTICOAGULATION VISIT (OUTPATIENT)
Dept: PHARMACY | Facility: HOSPITAL | Age: 38
End: 2023-10-10

## 2023-10-10 DIAGNOSIS — Z95.2 HX OF AORTIC VALVE REPLACEMENT, MECHANICAL: Primary | ICD-10-CM

## 2023-10-10 LAB — INR PPP: 2.29

## 2023-10-10 NOTE — PROGRESS NOTES
Anticoagulation Clinic - Remote Progress Note  REMOTE LAB    Indication: mechanical AVR (On-X)  Referring Provider: Latoya (Last seen: 11/21 next appt: 12/2022)  Goal INR: 1.5-2.5  Current Drug Interactions: aspirin; melatonin    Diet: Brussel sprouts or equivalent 1x/week (9/20/23)  Alcohol: None  Tobacco: None  OTC Pain Medication: APAP PRN    INR History:  Date 11/21 12/19 1/24/20 2/20 3/13 4/30 5/28 6/19 7/16 9/3 11/4 11/27   Total WeeklyDose 40mg 40mg 40mg 40mg 40mg 40mg 40mg 40mg 40mg 40mg 40mg 40mg   INR 1.94 1.99 1.96 2.16 1.86 2.62 2.68 1.21 1.78 1.71 1.46 1.93   Notes  amox x7d     decr GLV? incr GLV   overdue overdue; rcvd 11/30     Date 1/14/21 2/19 4/7 5/13 5/28 6/9 7/14 8/30 9/13 9/20 10/1 10/4   Total WeeklyDose 40mg 40mg 40mg 40mg 40mg 40mg 40mg 40mg 40mg 42.5mg 37.5mg 40mg   INR 1.97 2.26 2.11 1.47 1.56 2.19 1.49 1.34 1.46 1.39 1.19 1.24   Notes overdue; rec'd 1/15          1x miss enox     Date 10/7 10/11 10/20 11/24 12/7 1/4/22  3/4 3/11 4/21  7/8   Total WeeklyDose 52.5mg 55mg 55mg 55mg non- compliant 52.5mg non- compliant 52.5mg 50mg 52.5mg  52.5mg   INR 1.59 1.57 1.91 2.87   1.69  2.95 2.05 1.52  2.69   Notes  Zero GLV  rcv'd 11/29  recv'd 1/5   1x decr dose   zoloft initiation       Date 7/19 8/10 9/20 12/1 2/3 3/15  4/18 5/4  7/25 8/21   Total WeeklyDose 50mg 50mg 47.5 mg 47.5 mg  47.5 mg overdue 47.5mg 47.5 mg  overdue 47.5 mg 47.5 mg   INR 2.18 2.66 2.08 2.59 2.45 3.52  1.45 2.25  2.4 3.32   Notes    Due 10/4 rec 2/6 zoloft increase  Miss?   Rec 7/26 Rec  8/22     Date 8/31 9/19 10/10            Total WeeklyDose 47.5 mg 47.5 mg  47.5 mg             INR 2.12 3.48 2.29            Notes Rec'd 9/1 Rec'd 9/20  Wine                Phone Interview:  Tablet Strength: 2.5 mg and 5 mg tablets  Patient Contact Info: [phone]  Lab Contact Info: [facility]    Patient Findings  Negatives: Signs/symptoms of thrombosis, Signs/symptoms of bleeding, Laboratory test error suspected, Change in health,  Change in alcohol use, Change in activity, Upcoming invasive procedure, Emergency department visit, Upcoming dental procedure, Missed doses, Extra doses, Change in medications, Change in diet/appetite, Hospital admission, Bruising, Other complaints   Comments: Pt denies any changes and any concerns for bleeding or bruising. Verified last 7 day dosing.     Plan:     1. INR was Supratherapeutic at 2.29 (goal 1.5-2.5). Instructed Ms. Queen to continue warfarin 7.5mg oral daily except 5mg WedSat until recheck.  2. Repeat INR in 3 weeks, 10/31  3. Verbal information provided over the phone. Deisi Queen RBV dosing instructions, expresses understanding by teach back, and has no further questions at this time.    Shae Carter, Pharmacy Intern  10/10/23  14:45 EDT     I, Carmelita Morales, PharmD, have reviewed the note in full and agree with the assessment and plan.  10/10/23  16:09 EDT

## 2023-11-08 ENCOUNTER — ANTICOAGULATION VISIT (OUTPATIENT)
Dept: PHARMACY | Facility: HOSPITAL | Age: 38
End: 2023-11-08

## 2023-11-08 DIAGNOSIS — Z95.2 HX OF AORTIC VALVE REPLACEMENT, MECHANICAL: Primary | ICD-10-CM

## 2023-11-08 LAB — INR PPP: 1.77 (ref 1.5–2.5)

## 2023-11-08 NOTE — PROGRESS NOTES
Anticoagulation Clinic - Remote Progress Note  REMOTE LAB    Indication: mechanical AVR (On-X)  Referring Provider: Latoya (Last seen: 11/21 next appt: 12/2022)  Goal INR: 1.5-2.5  Current Drug Interactions: aspirin; melatonin    Diet: Brussel sprouts or equivalent 1x/week (9/20/23)  Alcohol: None  Tobacco: None  OTC Pain Medication: APAP PRN    INR History:  Date 11/21 12/19 1/24/20 2/20 3/13 4/30 5/28 6/19 7/16 9/3 11/4 11/27   Total WeeklyDose 40mg 40mg 40mg 40mg 40mg 40mg 40mg 40mg 40mg 40mg 40mg 40mg   INR 1.94 1.99 1.96 2.16 1.86 2.62 2.68 1.21 1.78 1.71 1.46 1.93   Notes  amox x7d     decr GLV? incr GLV   overdue overdue; rcvd 11/30     Date 1/14/21 2/19 4/7 5/13 5/28 6/9 7/14 8/30 9/13 9/20 10/1 10/4   Total WeeklyDose 40mg 40mg 40mg 40mg 40mg 40mg 40mg 40mg 40mg 42.5mg 37.5mg 40mg   INR 1.97 2.26 2.11 1.47 1.56 2.19 1.49 1.34 1.46 1.39 1.19 1.24   Notes overdue; rec'd 1/15          1x miss enox     Date 10/7 10/11 10/20 11/24 12/7 1/4/22  3/4 3/11 4/21  7/8   Total WeeklyDose 52.5mg 55mg 55mg 55mg non- compliant 52.5mg non- compliant 52.5mg 50mg 52.5mg  52.5mg   INR 1.59 1.57 1.91 2.87   1.69  2.95 2.05 1.52  2.69   Notes  Zero GLV  rcv'd 11/29  recv'd 1/5   1x decr dose   zoloft initiation       Date 7/19 8/10 9/20 12/1 2/3 3/15  4/18 5/4  7/25 8/21   Total WeeklyDose 50mg 50mg 47.5 mg 47.5 mg  47.5 mg overdue 47.5mg 47.5 mg  overdue 47.5 mg 47.5 mg   INR 2.18 2.66 2.08 2.59 2.45 3.52  1.45 2.25  2.4 3.32   Notes    Due 10/4 rec 2/6 zoloft increase  Miss?   Rec 7/26 Rec  8/22     Date 8/31 9/19 10/10 11/8           Total WeeklyDose 47.5 mg 47.5 mg  47.5 mg  47.5mg           INR 2.12 3.48 2.29 1.77           Notes Rec'd 9/1 Rec'd 9/20  Wine                Phone Interview:  Tablet Strength: 2.5 mg and 5 mg tablets  Patient Contact Info: [phone]  Lab Contact Info: [facility]    Patient Findings  Negatives: Signs/symptoms of thrombosis, Signs/symptoms of bleeding, Laboratory test error suspected,  Change in health, Change in alcohol use, Change in activity, Upcoming invasive procedure, Emergency department visit, Upcoming dental procedure, Missed doses, Extra doses, Change in medications, Change in diet/appetite, Hospital admission, Bruising, Other complaints   Comments: Ms Queen denies any changes at this time     Plan:     1. INR was Supratherapeutic at 2.29 (goal 1.5-2.5). Instructed Ms. Queen to continue warfarin 7.5mg oral daily except 5mg WedSat until recheck.  2. Repeat INR 12/6  3. Verbal information provided over the phone. Deisi Queen RBV dosing instructions, expresses understanding by teach back, and has no further questions at this time.    Bob Casiano, PharmD  11/8/2023  15:21 EST

## 2023-11-22 RX ORDER — WARFARIN SODIUM 2.5 MG/1
TABLET ORAL
Qty: 90 TABLET | Refills: 1 | Status: SHIPPED | OUTPATIENT
Start: 2023-11-22

## 2023-11-22 RX ORDER — WARFARIN SODIUM 5 MG/1
TABLET ORAL
Qty: 45 TABLET | Refills: 1 | Status: SHIPPED | OUTPATIENT
Start: 2023-11-22

## 2023-11-22 RX ORDER — WARFARIN SODIUM 5 MG/1
TABLET ORAL
Qty: 90 TABLET | Refills: 1 | Status: SHIPPED | OUTPATIENT
Start: 2023-11-22 | End: 2023-11-22

## 2023-12-04 ENCOUNTER — HOSPITAL ENCOUNTER (EMERGENCY)
Facility: HOSPITAL | Age: 38
Discharge: HOME | End: 2023-12-04
Attending: STUDENT IN AN ORGANIZED HEALTH CARE EDUCATION/TRAINING PROGRAM | Admitting: STUDENT IN AN ORGANIZED HEALTH CARE EDUCATION/TRAINING PROGRAM
Payer: COMMERCIAL

## 2023-12-04 ENCOUNTER — APPOINTMENT (OUTPATIENT)
Dept: ULTRASOUND IMAGING | Facility: HOSPITAL | Age: 38
End: 2023-12-04
Payer: COMMERCIAL

## 2023-12-04 VITALS
TEMPERATURE: 98.1 F | DIASTOLIC BLOOD PRESSURE: 94 MMHG | HEART RATE: 72 BPM | WEIGHT: 189 LBS | SYSTOLIC BLOOD PRESSURE: 149 MMHG | RESPIRATION RATE: 17 BRPM | HEIGHT: 62 IN | OXYGEN SATURATION: 98 % | BODY MASS INDEX: 34.78 KG/M2

## 2023-12-04 DIAGNOSIS — M79.89 SWELLING OF LEFT LOWER EXTREMITY: Primary | ICD-10-CM

## 2023-12-04 LAB
ALBUMIN SERPL-MCNC: 4.5 G/DL (ref 3.5–5.2)
ALBUMIN/GLOB SERPL: 1.6 G/DL
ALP SERPL-CCNC: 76 U/L (ref 39–117)
ALT SERPL W P-5'-P-CCNC: 11 U/L (ref 1–33)
ANION GAP SERPL CALCULATED.3IONS-SCNC: 10.4 MMOL/L (ref 5–15)
AST SERPL-CCNC: 17 U/L (ref 1–32)
BACTERIA UR QL AUTO: ABNORMAL /HPF
BASOPHILS # BLD AUTO: 0.03 10*3/MM3 (ref 0–0.2)
BASOPHILS NFR BLD AUTO: 0.5 % (ref 0–1.5)
BILIRUB SERPL-MCNC: 0.3 MG/DL (ref 0–1.2)
BILIRUB UR QL STRIP: NEGATIVE
BUN SERPL-MCNC: 13 MG/DL (ref 6–20)
BUN/CREAT SERPL: 13.1 (ref 7–25)
CALCIUM SPEC-SCNC: 9.2 MG/DL (ref 8.6–10.5)
CHLORIDE SERPL-SCNC: 102 MMOL/L (ref 98–107)
CLARITY UR: CLEAR
CO2 SERPL-SCNC: 23.6 MMOL/L (ref 22–29)
COLOR UR: YELLOW
CREAT SERPL-MCNC: 0.99 MG/DL (ref 0.57–1)
CRP SERPL-MCNC: 0.62 MG/DL (ref 0–0.5)
DEPRECATED RDW RBC AUTO: 46.7 FL (ref 37–54)
EGFRCR SERPLBLD CKD-EPI 2021: 74.5 ML/MIN/1.73
EOSINOPHIL # BLD AUTO: 0.12 10*3/MM3 (ref 0–0.4)
EOSINOPHIL NFR BLD AUTO: 2 % (ref 0.3–6.2)
ERYTHROCYTE [DISTWIDTH] IN BLOOD BY AUTOMATED COUNT: 14.9 % (ref 12.3–15.4)
GLOBULIN UR ELPH-MCNC: 2.8 GM/DL
GLUCOSE SERPL-MCNC: 95 MG/DL (ref 65–99)
GLUCOSE UR STRIP-MCNC: NEGATIVE MG/DL
HCT VFR BLD AUTO: 41 % (ref 34–46.6)
HGB BLD-MCNC: 12.8 G/DL (ref 12–15.9)
HGB UR QL STRIP.AUTO: ABNORMAL
HOLD SPECIMEN: NORMAL
HOLD SPECIMEN: NORMAL
HYALINE CASTS UR QL AUTO: ABNORMAL /LPF
IMM GRANULOCYTES # BLD AUTO: 0.01 10*3/MM3 (ref 0–0.05)
IMM GRANULOCYTES NFR BLD AUTO: 0.2 % (ref 0–0.5)
INR PPP: 1.33 (ref 0.9–1.1)
KETONES UR QL STRIP: NEGATIVE
LEUKOCYTE ESTERASE UR QL STRIP.AUTO: NEGATIVE
LYMPHOCYTES # BLD AUTO: 1.79 10*3/MM3 (ref 0.7–3.1)
LYMPHOCYTES NFR BLD AUTO: 30 % (ref 19.6–45.3)
MCH RBC QN AUTO: 26.9 PG (ref 26.6–33)
MCHC RBC AUTO-ENTMCNC: 31.2 G/DL (ref 31.5–35.7)
MCV RBC AUTO: 86.1 FL (ref 79–97)
MONOCYTES # BLD AUTO: 0.24 10*3/MM3 (ref 0.1–0.9)
MONOCYTES NFR BLD AUTO: 4 % (ref 5–12)
NEUTROPHILS NFR BLD AUTO: 3.78 10*3/MM3 (ref 1.7–7)
NEUTROPHILS NFR BLD AUTO: 63.3 % (ref 42.7–76)
NITRITE UR QL STRIP: NEGATIVE
NRBC BLD AUTO-RTO: 0 /100 WBC (ref 0–0.2)
PH UR STRIP.AUTO: 5.5 [PH] (ref 5–8)
PLATELET # BLD AUTO: 217 10*3/MM3 (ref 140–450)
PMV BLD AUTO: 9.5 FL (ref 6–12)
POTASSIUM SERPL-SCNC: 4.1 MMOL/L (ref 3.5–5.2)
PROT SERPL-MCNC: 7.3 G/DL (ref 6–8.5)
PROT UR QL STRIP: NEGATIVE
PROTHROMBIN TIME: 17 SECONDS (ref 12.1–14.7)
RBC # BLD AUTO: 4.76 10*6/MM3 (ref 3.77–5.28)
RBC # UR STRIP: ABNORMAL /HPF
REF LAB TEST METHOD: ABNORMAL
SODIUM SERPL-SCNC: 136 MMOL/L (ref 136–145)
SP GR UR STRIP: 1.01 (ref 1–1.03)
SQUAMOUS #/AREA URNS HPF: ABNORMAL /HPF
UROBILINOGEN UR QL STRIP: ABNORMAL
WBC # UR STRIP: ABNORMAL /HPF
WBC NRBC COR # BLD AUTO: 5.97 10*3/MM3 (ref 3.4–10.8)
WHOLE BLOOD HOLD COAG: NORMAL
WHOLE BLOOD HOLD SPECIMEN: NORMAL

## 2023-12-04 PROCEDURE — 86140 C-REACTIVE PROTEIN: CPT | Performed by: NURSE PRACTITIONER

## 2023-12-04 PROCEDURE — 36415 COLL VENOUS BLD VENIPUNCTURE: CPT

## 2023-12-04 PROCEDURE — 93971 EXTREMITY STUDY: CPT

## 2023-12-04 PROCEDURE — 85610 PROTHROMBIN TIME: CPT | Performed by: NURSE PRACTITIONER

## 2023-12-04 PROCEDURE — 93971 EXTREMITY STUDY: CPT | Performed by: RADIOLOGY

## 2023-12-04 PROCEDURE — 80053 COMPREHEN METABOLIC PANEL: CPT | Performed by: NURSE PRACTITIONER

## 2023-12-04 PROCEDURE — 81001 URINALYSIS AUTO W/SCOPE: CPT | Performed by: NURSE PRACTITIONER

## 2023-12-04 PROCEDURE — 85025 COMPLETE CBC W/AUTO DIFF WBC: CPT | Performed by: NURSE PRACTITIONER

## 2023-12-04 PROCEDURE — 99284 EMERGENCY DEPT VISIT MOD MDM: CPT

## 2023-12-04 NOTE — ED PROVIDER NOTES
Subjective   History of Present Illness  Patient is a 38 year old female with PMH significant for aortic insufficiency and stenosis- on coumadin. She presents to the ED for left lower leg pain and swelling. She reports she is concerned she could have a DVT. Denies any other significant complaints.        Review of Systems   Constitutional: Negative.  Negative for fever.   HENT: Negative.     Eyes: Negative.    Respiratory: Negative.     Cardiovascular: Negative.  Negative for chest pain.   Gastrointestinal: Negative.  Negative for abdominal pain.   Endocrine: Negative.    Genitourinary: Negative.  Negative for dysuria.   Musculoskeletal:         Positive for LLE pain and swelling.   Skin: Negative.    Allergic/Immunologic: Negative.    Neurological: Negative.    Hematological: Negative.    Psychiatric/Behavioral: Negative.     All other systems reviewed and are negative.      Past Medical History:   Diagnosis Date    Aortic insufficiency     Aortic stenosis     Atypical chest pain     Bicuspid aortic valve     Childhood asthma     childhood     Murmur     Palpitation        Allergies   Allergen Reactions    Flagyl [Metronidazole] Itching       Past Surgical History:   Procedure Laterality Date    CARDIAC CATHETERIZATION N/A 9/1/2016    Procedure: Right and Left Heart Cath;  Surgeon: Emy Hess MD;  Location:  [facility];  Service:     CORONARY ARTERY BYPASS GRAFT      TUBAL COAGULATION LAPAROSCOPIC Bilateral 8/18/2016    Procedure: TUBAL FALLOPE FILSHIE CLIPPING LAPAROSCOPIC;  Surgeon: Juan Jose Rojo DO;  Location:  [facility];  Service:        Family History   Problem Relation Age of Onset    Hypertension Mother     Hypertension Father        Social History     Socioeconomic History    Marital status:    Tobacco Use    Smoking status: Never    Smokeless tobacco: Never   Vaping Use    Vaping Use: Never used   Substance and Sexual Activity    Alcohol use: No    Drug use: No    Sexual  activity: Defer     Partners: Male           Objective   Physical Exam  Vitals and nursing note reviewed.   Constitutional:       General: She is not in acute distress.     Appearance: She is well-developed. She is not diaphoretic.   HENT:      Head: Normocephalic and atraumatic.      Right Ear: External ear normal.      Left Ear: External ear normal.      Nose: Nose normal.   Eyes:      Conjunctiva/sclera: Conjunctivae normal.      Pupils: Pupils are equal, round, and reactive to light.   Neck:      Vascular: No JVD.      Trachea: No tracheal deviation.   Cardiovascular:      Rate and Rhythm: Normal rate and regular rhythm.      Heart sounds: Normal heart sounds. No murmur heard.  Pulmonary:      Effort: Pulmonary effort is normal. No respiratory distress.      Breath sounds: Normal breath sounds. No wheezing.   Abdominal:      General: Bowel sounds are normal.      Palpations: Abdomen is soft.      Tenderness: There is no abdominal tenderness.   Musculoskeletal:         General: No deformity.      Cervical back: Normal range of motion and neck supple.      Left lower leg: Swelling present.   Skin:     General: Skin is warm and dry.      Capillary Refill: Capillary refill takes less than 2 seconds.      Coloration: Skin is not pale.      Findings: No erythema or rash.   Neurological:      General: No focal deficit present.      Mental Status: She is alert and oriented to person, place, and time. Mental status is at baseline.      Cranial Nerves: No cranial nerve deficit.   Psychiatric:         Mood and Affect: Mood normal.         Behavior: Behavior normal.         Thought Content: Thought content normal.         Judgment: Judgment normal.         Procedures       Results for orders placed or performed during the hospital encounter of 12/04/23   Comprehensive Metabolic Panel    Specimen: Arm, Right; Blood   Result Value Ref Range    Glucose 95 65 - 99 mg/dL    BUN 13 6 - 20 mg/dL    Creatinine 0.99 0.57 - 1.00  mg/dL    Sodium 136 136 - 145 mmol/L    Potassium 4.1 3.5 - 5.2 mmol/L    Chloride 102 98 - 107 mmol/L    CO2 23.6 22.0 - 29.0 mmol/L    Calcium 9.2 8.6 - 10.5 mg/dL    Total Protein 7.3 6.0 - 8.5 g/dL    Albumin 4.5 3.5 - 5.2 g/dL    ALT (SGPT) 11 1 - 33 U/L    AST (SGOT) 17 1 - 32 U/L    Alkaline Phosphatase 76 39 - 117 U/L    Total Bilirubin 0.3 0.0 - 1.2 mg/dL    Globulin 2.8 gm/dL    A/G Ratio 1.6 g/dL    BUN/Creatinine Ratio 13.1 7.0 - 25.0    Anion Gap 10.4 5.0 - 15.0 mmol/L    eGFR 74.5 >60.0 mL/min/1.73   Protime-INR    Specimen: Arm, Right; Blood   Result Value Ref Range    Protime 17.0 (H) 12.1 - 14.7 Seconds    INR 1.33 (H) 0.90 - 1.10   C-reactive Protein    Specimen: Arm, Right; Blood   Result Value Ref Range    C-Reactive Protein 0.62 (H) 0.00 - 0.50 mg/dL   CBC Auto Differential    Specimen: Arm, Right; Blood   Result Value Ref Range    WBC 5.97 3.40 - 10.80 10*3/mm3    RBC 4.76 3.77 - 5.28 10*6/mm3    Hemoglobin 12.8 12.0 - 15.9 g/dL    Hematocrit 41.0 34.0 - 46.6 %    MCV 86.1 79.0 - 97.0 fL    MCH 26.9 26.6 - 33.0 pg    MCHC 31.2 (L) 31.5 - 35.7 g/dL    RDW 14.9 12.3 - 15.4 %    RDW-SD 46.7 37.0 - 54.0 fl    MPV 9.5 6.0 - 12.0 fL    Platelets 217 140 - 450 10*3/mm3    Neutrophil % 63.3 42.7 - 76.0 %    Lymphocyte % 30.0 19.6 - 45.3 %    Monocyte % 4.0 (L) 5.0 - 12.0 %    Eosinophil % 2.0 0.3 - 6.2 %    Basophil % 0.5 0.0 - 1.5 %    Immature Grans % 0.2 0.0 - 0.5 %    Neutrophils, Absolute 3.78 1.70 - 7.00 10*3/mm3    Lymphocytes, Absolute 1.79 0.70 - 3.10 10*3/mm3    Monocytes, Absolute 0.24 0.10 - 0.90 10*3/mm3    Eosinophils, Absolute 0.12 0.00 - 0.40 10*3/mm3    Basophils, Absolute 0.03 0.00 - 0.20 10*3/mm3    Immature Grans, Absolute 0.01 0.00 - 0.05 10*3/mm3    nRBC 0.0 0.0 - 0.2 /100 WBC      US Venous Doppler Lower Extremity Right (duplex)   Final Result   No DVT in the right lower extremity on today's exam.        This report was finalized on 12/4/2023 5:33 PM by Dr. Domenico Rosales MD.                ED Course                                             Medical Decision Making  Amount and/or Complexity of Data Reviewed  Labs: ordered.        Final diagnoses:   Swelling of left lower extremity       ED Disposition  ED Disposition       ED Disposition   Discharge    Condition   Stable    Comment   --               Hans Mary  [address] [place] [address] [phone]    Call in 2 days           Medication List      No changes were made to your prescriptions during this visit.            Ashley Price, APRN  12/04/23 [id number]

## 2023-12-05 ENCOUNTER — TELEPHONE (OUTPATIENT)
Dept: CARDIOLOGY | Facility: CLINIC | Age: 38
End: 2023-12-05
Payer: COMMERCIAL

## 2023-12-05 DIAGNOSIS — Z95.2 HX OF AORTIC VALVE REPLACEMENT, MECHANICAL: ICD-10-CM

## 2023-12-05 DIAGNOSIS — Q23.1 AORTIC STENOSIS DUE TO BICUSPID AORTIC VALVE: Primary | ICD-10-CM

## 2023-12-05 DIAGNOSIS — Q23.0 AORTIC STENOSIS DUE TO BICUSPID AORTIC VALVE: Primary | ICD-10-CM

## 2023-12-05 DIAGNOSIS — Q23.1 BICUSPID AORTIC VALVE: ICD-10-CM

## 2023-12-05 NOTE — TELEPHONE ENCOUNTER
Patient left message stating she has some concerns and questions and went to ER for possible blood clot.     Attempted to call patient back, no answer, left message to call back.

## 2024-01-03 ENCOUNTER — ANTICOAGULATION VISIT (OUTPATIENT)
Dept: PHARMACY | Facility: HOSPITAL | Age: 39
End: 2024-01-03
Payer: COMMERCIAL

## 2024-01-03 DIAGNOSIS — Z95.2 HX OF AORTIC VALVE REPLACEMENT, MECHANICAL: Primary | ICD-10-CM

## 2024-01-03 LAB — INR PPP: 1.83

## 2024-01-03 NOTE — PROGRESS NOTES
Anticoagulation Clinic - Remote Progress Note  REMOTE LAB    Indication: mechanical AVR (On-X)  Referring Provider: Latoya (Last seen: 11/21 next appt: 12/2022)  Goal INR: 1.5-2.5  Current Drug Interactions: aspirin; melatonin    Diet: Brussel sprouts or equivalent 1x/week (9/20/23)  Alcohol: None  Tobacco: None  OTC Pain Medication: APAP PRN    INR History:  Date 11/21 12/19 1/24/20 2/20 3/13 4/30 5/28 6/19 7/16 9/3 11/4 11/27   Total WeeklyDose 40mg 40mg 40mg 40mg 40mg 40mg 40mg 40mg 40mg 40mg 40mg 40mg   INR 1.94 1.99 1.96 2.16 1.86 2.62 2.68 1.21 1.78 1.71 1.46 1.93   Notes  amox x7d     decr GLV? incr GLV   overdue overdue; rcvd 11/30     Date 1/14/21 2/19 4/7 5/13 5/28 6/9 7/14 8/30 9/13 9/20 10/1 10/4   Total WeeklyDose 40mg 40mg 40mg 40mg 40mg 40mg 40mg 40mg 40mg 42.5mg 37.5mg 40mg   INR 1.97 2.26 2.11 1.47 1.56 2.19 1.49 1.34 1.46 1.39 1.19 1.24   Notes overdue; rec'd 1/15          1x miss enox     Date 10/7 10/11 10/20 11/24 12/7 1/4/22  3/4 3/11 4/21  7/8   Total WeeklyDose 52.5mg 55mg 55mg 55mg non- compliant 52.5mg non- compliant 52.5mg 50mg 52.5mg  52.5mg   INR 1.59 1.57 1.91 2.87   1.69  2.95 2.05 1.52  2.69   Notes  Zero GLV  rcv'd 11/29  recv'd 1/5   1x decr dose   zoloft initiation       Date 7/19 8/10 9/20 12/1 2/3 3/15  4/18 5/4  7/25 8/21   Total WeeklyDose 50mg 50mg 47.5 mg 47.5 mg  47.5 mg overdue 47.5mg 47.5 mg  overdue 47.5 mg 47.5 mg   INR 2.18 2.66 2.08 2.59 2.45 3.52  1.45 2.25  2.4 3.32   Notes    Due 10/4 rec 2/6 zoloft increase  Miss?   Rec 7/26 Rec  8/22     Date 8/31 9/19 10/10 11/8 12/4 1/3         Total WeeklyDose 47.5 mg 47.5 mg  47.5 mg  47.5mg  47.5 mg         INR 2.12 3.48 2.29 1.77 1.33 1.83         Notes Rec'd 9/1 Rec'd 9/20  [facility]    ED-- clinic unaware            Phone Interview:  Tablet Strength: 2.5 mg and 5 mg tablets  Patient Contact Info: [phone]  Lab Contact Info: [facility]    Patient Findings    Positives: Emergency department visit   Negatives:  Signs/symptoms of thrombosis, Signs/symptoms of bleeding, Laboratory test error suspected, Change in health, Change in alcohol use, Change in activity, Upcoming invasive procedure, Upcoming dental procedure, Missed doses, Extra doses, Change in medications, Change in diet/appetite, Hospital admission, Bruising, Other complaints   Comments: Ed visit to r/o DVT 12/4 INR was subtherapeutic at that time     Plan:     1. INR is therapeutic at 1.83 (goal 1.5-2.5). Instructed Ms. Queen to continue warfarin 7.5mg oral daily except 5mg WedSat until recheck.  2. Repeat INR 1/31  3. Verbal information provided over the phone. Deisi Queen RBV dosing instructions, expresses understanding by teach back, and has no further questions at this time.    Paulette Gonzalez, PharmD.  01/03/24   13:53 EST

## 2024-02-15 LAB — INR PPP: 1.74

## 2024-02-16 ENCOUNTER — ANTICOAGULATION VISIT (OUTPATIENT)
Dept: PHARMACY | Facility: HOSPITAL | Age: 39
End: 2024-02-16
Payer: COMMERCIAL

## 2024-02-16 DIAGNOSIS — Z95.2 HX OF AORTIC VALVE REPLACEMENT, MECHANICAL: Primary | ICD-10-CM

## 2024-02-19 ENCOUNTER — HOSPITAL ENCOUNTER (OUTPATIENT)
Dept: CARDIOLOGY | Facility: HOSPITAL | Age: 39
Discharge: HOME | End: 2024-02-19
Admitting: INTERNAL MEDICINE
Payer: COMMERCIAL

## 2024-02-19 ENCOUNTER — OFFICE VISIT (OUTPATIENT)
Dept: CARDIOLOGY | Facility: CLINIC | Age: 39
End: 2024-02-19
Payer: COMMERCIAL

## 2024-02-19 VITALS
WEIGHT: 189 LBS | DIASTOLIC BLOOD PRESSURE: 92 MMHG | BODY MASS INDEX: 34.78 KG/M2 | HEIGHT: 62 IN | SYSTOLIC BLOOD PRESSURE: 156 MMHG

## 2024-02-19 VITALS
BODY MASS INDEX: 31.47 KG/M2 | HEIGHT: 62 IN | SYSTOLIC BLOOD PRESSURE: 132 MMHG | OXYGEN SATURATION: 98 % | WEIGHT: 171 LBS | HEART RATE: 72 BPM | DIASTOLIC BLOOD PRESSURE: 70 MMHG

## 2024-02-19 DIAGNOSIS — Z95.2 HX OF AORTIC VALVE REPLACEMENT, MECHANICAL: ICD-10-CM

## 2024-02-19 DIAGNOSIS — I35.0 NONRHEUMATIC AORTIC VALVE STENOSIS: Primary | ICD-10-CM

## 2024-02-19 DIAGNOSIS — Q23.1 BICUSPID AORTIC VALVE: ICD-10-CM

## 2024-02-19 DIAGNOSIS — Q23.1 AORTIC STENOSIS DUE TO BICUSPID AORTIC VALVE: ICD-10-CM

## 2024-02-19 DIAGNOSIS — Q23.0 AORTIC STENOSIS DUE TO BICUSPID AORTIC VALVE: ICD-10-CM

## 2024-02-19 LAB
BH CV ECHO MEAS - AO MAX PG: 21.6 MMHG
BH CV ECHO MEAS - AO MEAN PG: 11.5 MMHG
BH CV ECHO MEAS - AO ROOT DIAM: 2.9 CM
BH CV ECHO MEAS - AO V2 MAX: 232.5 CM/SEC
BH CV ECHO MEAS - AO V2 VTI: 44.8 CM
BH CV ECHO MEAS - AVA(I,D): 1.51 CM2
BH CV ECHO MEAS - EDV(CUBED): 64 ML
BH CV ECHO MEAS - EDV(MOD-SP2): 82 ML
BH CV ECHO MEAS - EDV(MOD-SP4): 98 ML
BH CV ECHO MEAS - EF(MOD-BP): 56 %
BH CV ECHO MEAS - EF(MOD-SP2): 52.4 %
BH CV ECHO MEAS - EF(MOD-SP4): 51 %
BH CV ECHO MEAS - ESV(CUBED): 19.5 ML
BH CV ECHO MEAS - ESV(MOD-SP2): 39 ML
BH CV ECHO MEAS - ESV(MOD-SP4): 48 ML
BH CV ECHO MEAS - FS: 32.8 %
BH CV ECHO MEAS - IVS/LVPW: 1.02 CM
BH CV ECHO MEAS - IVSD: 1.21 CM
BH CV ECHO MEAS - LA DIMENSION: 2.7 CM
BH CV ECHO MEAS - LV DIASTOLIC VOL/BSA (35-75): 52.5 CM2
BH CV ECHO MEAS - LV MASS(C)D: 165.5 GRAMS
BH CV ECHO MEAS - LV MAX PG: 4.9 MMHG
BH CV ECHO MEAS - LV MEAN PG: 3 MMHG
BH CV ECHO MEAS - LV SYSTOLIC VOL/BSA (12-30): 25.7 CM2
BH CV ECHO MEAS - LV V1 MAX: 111 CM/SEC
BH CV ECHO MEAS - LV V1 VTI: 21.5 CM
BH CV ECHO MEAS - LVIDD: 4 CM
BH CV ECHO MEAS - LVIDS: 2.7 CM
BH CV ECHO MEAS - LVOT AREA: 3.1 CM2
BH CV ECHO MEAS - LVOT DIAM: 2 CM
BH CV ECHO MEAS - LVPWD: 1.19 CM
BH CV ECHO MEAS - MR MAX PG: 109.9 MMHG
BH CV ECHO MEAS - MR MAX VEL: 523 CM/SEC
BH CV ECHO MEAS - MV A MAX VEL: 67.1 CM/SEC
BH CV ECHO MEAS - MV DEC SLOPE: 232 CM/SEC2
BH CV ECHO MEAS - MV DEC TIME: 0.18 SEC
BH CV ECHO MEAS - MV E MAX VEL: 104 CM/SEC
BH CV ECHO MEAS - MV E/A: 1.55
BH CV ECHO MEAS - MV MAX PG: 2.8 MMHG
BH CV ECHO MEAS - MV MEAN PG: 1 MMHG
BH CV ECHO MEAS - MV P1/2T: 105.9 MSEC
BH CV ECHO MEAS - MV V2 VTI: 25.5 CM
BH CV ECHO MEAS - MVA(P1/2T): 2.08 CM2
BH CV ECHO MEAS - MVA(VTI): 2.6 CM2
BH CV ECHO MEAS - PA ACC SLOPE: 716 CM/SEC2
BH CV ECHO MEAS - PA ACC TIME: 0.12 SEC
BH CV ECHO MEAS - PA V2 MAX: 116 CM/SEC
BH CV ECHO MEAS - PI END-D VEL: 73.9 CM/SEC
BH CV ECHO MEAS - RAP SYSTOLE: 3 MMHG
BH CV ECHO MEAS - RVSP: 29 MMHG
BH CV ECHO MEAS - SI(MOD-SP2): 23 ML/M2
BH CV ECHO MEAS - SI(MOD-SP4): 26.8 ML/M2
BH CV ECHO MEAS - SV(LVOT): 67.5 ML
BH CV ECHO MEAS - SV(MOD-SP2): 43 ML
BH CV ECHO MEAS - SV(MOD-SP4): 50 ML
BH CV ECHO MEAS - TAPSE (>1.6): 1.7 CM
BH CV ECHO MEAS - TR MAX PG: 26 MMHG
BH CV ECHO MEAS - TR MAX VEL: 231 CM/SEC
BH CV XLRA - RV BASE: 3 CM
BH CV XLRA - RV LENGTH: 7.6 CM
BH CV XLRA - RV MID: 2.4 CM
LEFT ATRIUM VOLUME INDEX: 28.9 ML/M2

## 2024-02-19 PROCEDURE — 99213 OFFICE O/P EST LOW 20 MIN: CPT | Performed by: INTERNAL MEDICINE

## 2024-02-19 PROCEDURE — 93306 TTE W/DOPPLER COMPLETE: CPT | Performed by: INTERNAL MEDICINE

## 2024-02-19 PROCEDURE — 93306 TTE W/DOPPLER COMPLETE: CPT

## 2024-02-19 RX ORDER — SERTRALINE HYDROCHLORIDE 100 MG/1
100 TABLET, FILM COATED ORAL DAILY
COMMUNITY

## 2024-02-19 NOTE — PROGRESS NOTES
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    PROBLEM LIST:   Bicuspid aortic valve with combined aortic stenosis/aortic insufficiency.  In 2005, remote onset of auscultated murmur by primary care physician with ELVIRA per Dr. Ortiz with bicuspid findings.   9/2016 #25 AVR On X Dacron tube  graft & root replacement  w coronary reimplantation-Dr Bazzi  periop tamponade requiring window  11/17 echo acceptable AVR , EF 60%  11/2020 echo: EF 60%, concentric LVH, normal function mechanical AV  2/24 echo EF greater than 55 normal mechanical AV function  Atypical chest pain.  On 4/16/2012, GXT stress test revealing 12.8 MET exercise. T-wave inversion in inferior leads and in leads 4 - leads 6 and no exercise induced arrhythmias.  Palpitations.   On 4/17/2012, 24-hour Holter monitor revealing heart rate ranging from  beats per minute with 56 PVCs, no pauses.  HLD  11/21/2019 lipids:   HDL 36   10/21 164/162/48/88  Childhood asthma.  G2, P3      CC:   Chief Complaint   Patient presents with    Nonrheumatic aortic valve stenosis       Allergies  Allergies   Allergen Reactions    Flagyl [Metronidazole] Itching       Current Medications    Current Outpatient Medications:     melatonin 5 MG tablet tablet, Take 1 tablet by mouth Daily., Disp: , Rfl:     Multiple Vitamins-Minerals (MULTI ADULT GUMMIES PO), Take  by mouth Daily., Disp: , Rfl:     ondansetron (ZOFRAN) 8 MG tablet, Take  by mouth Every 8 (Eight) Hours., Disp: , Rfl:     sertraline (ZOLOFT) 100 MG tablet, Take 1 tablet by mouth Daily., Disp: , Rfl:     warfarin (COUMADIN) 2.5 MG tablet, TAKE 1 TABLET BY MOUTH DAILY AS DIRECTED BY THE ANTICOAGULATION CLINIC, Disp: 90 tablet, Rfl: 1    warfarin  "(COUMADIN) 5 MG tablet, TAKE 1 TO ONE AND A HALF (1 & 1/2) TABLETS BY MOUTH EVERY DAY AS DIRECTED BY THE ANTICOAGULATION CLINIC, Disp: 45 tablet, Rfl: 1      History of Present Illness   Deisi Queen is a 38 y.o. year old female here for follow up.  No new cardiac issues.  She continues to walk when she can.  Work is become more of an issue      OBJECTIVE:  Vitals:    02/19/24 1047   BP: 132/70   BP Location: Right arm   Patient Position: Sitting   Cuff Size: Adult   Pulse: 72   SpO2: 98%   Weight: 77.6 kg (171 lb)   Height: 157.5 cm (62\")     Body mass index is 31.28 kg/m².    Constitutional:       Appearance: Healthy appearance. Not in distress.   Neck:      Vascular: No JVR. JVD normal.   Pulmonary:      Effort: Pulmonary effort is normal.      Breath sounds: Normal breath sounds. No wheezing. No rhonchi. No rales.   Chest:      Chest wall: Not tender to palpatation.   Cardiovascular:      PMI at left midclavicular line. Normal rate. Regular rhythm. Normal S1. Normal S2.       Murmurs: There is no murmur.      No gallop.  Systolic ejection click. No rub.   Pulses:     Intact distal pulses.   Edema:     Peripheral edema absent.   Abdominal:      General: Bowel sounds are normal.      Palpations: Abdomen is soft.      Tenderness: There is no abdominal tenderness.   Musculoskeletal: Normal range of motion.         General: No tenderness. Skin:     General: Skin is warm and dry.   Neurological:      General: No focal deficit present.      Mental Status: Alert and oriented to person, place and time.         Diagnostic Data:  Procedures      ASSESSMENT:   Diagnosis Plan   1. Nonrheumatic aortic valve stenosis              PLAN:  Bicuspid aortic valve status post mechanical AVR acceptable function per echocardiogram asymptomatic continue yearly follow-up echocardiogram  next year          Niels Klein MD, [facility]  "

## 2024-04-08 ENCOUNTER — ANTICOAGULATION VISIT (OUTPATIENT)
Dept: PHARMACY | Facility: HOSPITAL | Age: 39
End: 2024-04-08
Payer: COMMERCIAL

## 2024-04-08 DIAGNOSIS — Z95.2 HX OF AORTIC VALVE REPLACEMENT, MECHANICAL: Primary | ICD-10-CM

## 2024-04-08 LAB — INR PPP: 1.1

## 2024-04-08 RX ORDER — WARFARIN SODIUM 2.5 MG/1
TABLET ORAL
Qty: 90 TABLET | Refills: 1 | Status: SHIPPED | OUTPATIENT
Start: 2024-04-08

## 2024-04-08 RX ORDER — WARFARIN SODIUM 5 MG/1
TABLET ORAL
Qty: 90 TABLET | Refills: 1 | Status: SHIPPED | OUTPATIENT
Start: 2024-04-08

## 2024-04-08 NOTE — PROGRESS NOTES
Anticoagulation Clinic - Remote Progress Note  REMOTE LAB    Indication: mechanical AVR (On-X)  Referring Provider: Latoya (Last seen: 11/21 next appt: 2/19/24)  Goal INR: 1.5-2.5  Current Drug Interactions: aspirin; melatonin    Diet: Brussel sprouts or equivalent 1x/week (9/20/23)  Alcohol: None  Tobacco: None  OTC Pain Medication: APAP PRN    INR History:  Date 11/21 12/19 1/24/20 2/20 3/13 4/30 5/28 6/19 7/16 9/3 11/4 11/27   Total WeeklyDose 40mg 40mg 40mg 40mg 40mg 40mg 40mg 40mg 40mg 40mg 40mg 40mg   INR 1.94 1.99 1.96 2.16 1.86 2.62 2.68 1.21 1.78 1.71 1.46 1.93   Notes  amox x7d     decr GLV? incr GLV   overdue overdue; rcvd 11/30     Date 1/14/21 2/19 4/7 5/13 5/28 6/9 7/14 8/30 9/13 9/20 10/1 10/4   Total WeeklyDose 40mg 40mg 40mg 40mg 40mg 40mg 40mg 40mg 40mg 42.5mg 37.5mg 40mg   INR 1.97 2.26 2.11 1.47 1.56 2.19 1.49 1.34 1.46 1.39 1.19 1.24   Notes overdue; rec'd 1/15          1x miss enox     Date 10/7 10/11 10/20 11/24 12/7 1/4/22  3/4 3/11 4/21  7/8   Total WeeklyDose 52.5mg 55mg 55mg 55mg non- compliant 52.5mg non- compliant 52.5mg 50mg 52.5mg  52.5mg   INR 1.59 1.57 1.91 2.87   1.69  2.95 2.05 1.52  2.69   Notes  Zero GLV  rcv'd 11/29  recv'd 1/5   1x decr dose   zoloft initiation       Date 7/19 8/10 9/20 12/1 2/3 3/15  4/18 5/4  7/25 8/21   Total WeeklyDose 50mg 50mg 47.5 mg 47.5 mg  47.5 mg overdue 47.5mg 47.5 mg  overdue 47.5 mg 47.5 mg   INR 2.18 2.66 2.08 2.59 2.45 3.52  1.45 2.25  2.4 3.32   Notes    Due 10/4 rec 2/6 zoloft increase  Miss?   Rec 7/26 Rec  8/22     Date 8/31 9/19 10/10 11/8 12/4 1/3 2/15 4/8       Total WeeklyDose 47.5 mg 47.5 mg  47.5 mg  47.5mg  47.5 mg 47.5 mg 35 mg       INR 2.12 3.48 2.29 1.77 1.33 1.83 1.74 1.17       Notes Rec'd 9/1 Rec'd 9/20  [facility]    ED-- clinic unaware  Rec'd 2/15 Missed doses          Phone Interview:  Tablet Strength: 2.5 mg and 5 mg tablets  Patient Contact Info: [phone]  Lab Contact Info: [facility]    Patient Findings    Positives:  Missed doses   Negatives: Signs/symptoms of thrombosis, Signs/symptoms of bleeding, Laboratory test error suspected, Change in health, Change in alcohol use, Change in activity, Upcoming invasive procedure, Emergency department visit, Upcoming dental procedure, Extra doses, Change in medications, Change in diet/appetite, Hospital admission, Bruising, Other complaints   Comments: Patient reports she completely ran out of warfarin and missed Sat/Sun dose this past weekend. She reports all other findings are negative and verified dosing.     Plan:     1. INR is subtherapeutic today at 1.17 (goal 1.5-2.5). Instructed Ms. Queen to BOOST warfarin dose tonight and tomorrow to 10 mg then resume warfarin 7.5 mg daily except 5 mg Sun, Wed until recheck.   2. Refill for both 5 mg and 2.5 mg warfarin tablets sent this encounter.   3. Repeat INR Thursday 4/11/24. Patient agreeable to plan.   4. Verbal information provided over the phone. Deisi Queen RBV dosing instructions, expresses understanding by teach back, and has no further questions at this time.    Alondra Gilmore, PharmD  04/08/24   15:03 EDT

## 2024-04-12 ENCOUNTER — ANTICOAGULATION VISIT (OUTPATIENT)
Dept: PHARMACY | Facility: HOSPITAL | Age: 39
End: 2024-04-12
Payer: COMMERCIAL

## 2024-04-12 DIAGNOSIS — Z95.2 HX OF AORTIC VALVE REPLACEMENT, MECHANICAL: Primary | ICD-10-CM

## 2024-04-12 LAB — INR PPP: 1.54

## 2024-04-12 NOTE — PROGRESS NOTES
Anticoagulation Clinic - Remote Progress Note  REMOTE LAB    Indication: mechanical AVR (On-X)  Referring Provider: Latoya (Last seen: 11/21 next appt: 2/19/24)  Goal INR: 1.5-2.5  Current Drug Interactions: aspirin; melatonin    Diet: Brussel sprouts or equivalent 1x/week (9/20/23)  Alcohol: None  Tobacco: None  OTC Pain Medication: APAP PRN    INR History:  Date 11/21 12/19 1/24/20 2/20 3/13 4/30 5/28 6/19 7/16 9/3 11/4 11/27   Total WeeklyDose 40mg 40mg 40mg 40mg 40mg 40mg 40mg 40mg 40mg 40mg 40mg 40mg   INR 1.94 1.99 1.96 2.16 1.86 2.62 2.68 1.21 1.78 1.71 1.46 1.93   Notes  amox x7d     decr GLV? incr GLV   overdue overdue; rcvd 11/30     Date 1/14/21 2/19 4/7 5/13 5/28 6/9 7/14 8/30 9/13 9/20 10/1 10/4   Total WeeklyDose 40mg 40mg 40mg 40mg 40mg 40mg 40mg 40mg 40mg 42.5mg 37.5mg 40mg   INR 1.97 2.26 2.11 1.47 1.56 2.19 1.49 1.34 1.46 1.39 1.19 1.24   Notes overdue; rec'd 1/15          1x miss enox     Date 10/7 10/11 10/20 11/24 12/7 1/4/22  3/4 3/11 4/21  7/8   Total WeeklyDose 52.5mg 55mg 55mg 55mg non- compliant 52.5mg non- compliant 52.5mg 50mg 52.5mg  52.5mg   INR 1.59 1.57 1.91 2.87   1.69  2.95 2.05 1.52  2.69   Notes  Zero GLV  rcv'd 11/29  recv'd 1/5   1x decr dose   zoloft initiation       Date 7/19 8/10 9/20 12/1 2/3 3/15  4/18 5/4  7/25 8/21   Total WeeklyDose 50mg 50mg 47.5 mg 47.5 mg  47.5 mg overdue 47.5mg 47.5 mg  overdue 47.5 mg 47.5 mg   INR 2.18 2.66 2.08 2.59 2.45 3.52  1.45 2.25  2.4 3.32   Notes    Due 10/4 rec 2/6 zoloft increase  Miss?   Rec 7/26 Rec  8/22     Date 8/31 9/19 10/10 11/8 12/4 1/3 2/15 4/8 4/12      Total WeeklyDose 47.5 mg 47.5 mg  47.5 mg  47.5mg  47.5 mg 47.5 mg 35 mg 40 mg      INR 2.12 3.48 2.29 1.77 1.33 1.83 1.74 1.17 1.54      Notes Rec'd 9/1 Rec'd 9/20  [facility]    ED-- clinic unaware  Rec'd 2/15 Missed doses  Boost x 2        Phone Interview:  Tablet Strength: 2.5 mg and 5 mg tablets  Patient Contact Info: [phone]  Lab Contact Info: [facility]    Patient  Findings    Negatives: Signs/symptoms of thrombosis, Signs/symptoms of bleeding, Laboratory test error suspected, Change in health, Change in alcohol use, Change in activity, Upcoming invasive procedure, Emergency department visit, Upcoming dental procedure, Missed doses, Extra doses, Change in medications, Change in diet/appetite, Hospital admission, Bruising, Other complaints   Comments: Nothing has changed since visit       Plan:     1. INR is therapeutic today at 1.54 (goal 1.5-2.5). Instructed Ms. Queen to resume warfarin dosing regimen 5 mg every Sun, Wed; 7.5 mg all other days until recheck.   2. Repeat INR 4/19/24. Patient agreeable to plan.   3. Verbal information provided over the phone. Deisi Queen RBV dosing instructions, expresses understanding by teach back, and has no further questions at this time.    Celia Rawls, PharmD  4/12/2024  15:06 EDT

## 2024-05-07 LAB — INR PPP: 1.37

## 2024-05-08 ENCOUNTER — ANTICOAGULATION VISIT (OUTPATIENT)
Dept: PHARMACY | Facility: HOSPITAL | Age: 39
End: 2024-05-08
Payer: COMMERCIAL

## 2024-05-08 DIAGNOSIS — Z95.2 HX OF AORTIC VALVE REPLACEMENT, MECHANICAL: Primary | ICD-10-CM

## 2024-06-11 LAB — INR PPP: 1.68

## 2024-06-12 ENCOUNTER — ANTICOAGULATION VISIT (OUTPATIENT)
Dept: PHARMACY | Facility: HOSPITAL | Age: 39
End: 2024-06-12
Payer: COMMERCIAL

## 2024-06-12 DIAGNOSIS — Z95.2 HX OF AORTIC VALVE REPLACEMENT, MECHANICAL: Primary | ICD-10-CM

## 2024-06-12 NOTE — PROGRESS NOTES
Anticoagulation Clinic - Remote Progress Note  REMOTE LAB    Indication: mechanical AVR (On-X)  Referring Provider: Latoya (Last seen: 11/21 next appt: 2/19/24)  Goal INR: 1.5-2.5  Current Drug Interactions: aspirin; melatonin    Diet: Brussel sprouts or equivalent 1x/week (9/20/23)  Alcohol: None  Tobacco: None  OTC Pain Medication: APAP PRN    INR History:  Date 11/21 12/19 1/24/20 2/20 3/13 4/30 5/28 6/19 7/16 9/3 11/4 11/27   Total WeeklyDose 40mg 40mg 40mg 40mg 40mg 40mg 40mg 40mg 40mg 40mg 40mg 40mg   INR 1.94 1.99 1.96 2.16 1.86 2.62 2.68 1.21 1.78 1.71 1.46 1.93   Notes  amox x7d     decr GLV? incr GLV   overdue overdue; rcvd 11/30     Date 1/14/21 2/19 4/7 5/13 5/28 6/9 7/14 8/30 9/13 9/20 10/1 10/4   Total WeeklyDose 40mg 40mg 40mg 40mg 40mg 40mg 40mg 40mg 40mg 42.5mg 37.5mg 40mg   INR 1.97 2.26 2.11 1.47 1.56 2.19 1.49 1.34 1.46 1.39 1.19 1.24   Notes overdue; rec'd 1/15          1x miss enox     Date 10/7 10/11 10/20 11/24 12/7 1/4/22  3/4 3/11 4/21  7/8   Total WeeklyDose 52.5mg 55mg 55mg 55mg non- compliant 52.5mg non- compliant 52.5mg 50mg 52.5mg  52.5mg   INR 1.59 1.57 1.91 2.87   1.69  2.95 2.05 1.52  2.69   Notes  Zero GLV  rcv'd 11/29  recv'd 1/5   1x decr dose   zoloft initiation       Date 7/19 8/10 9/20 12/1 2/3 3/15  4/18 5/4  7/25 8/21   Total WeeklyDose 50mg 50mg 47.5 mg 47.5 mg  47.5 mg overdue 47.5mg 47.5 mg  overdue 47.5 mg 47.5 mg   INR 2.18 2.66 2.08 2.59 2.45 3.52  1.45 2.25  2.4 3.32   Notes    Due 10/4 rec 2/6 zoloft increase  Miss?   Rec 7/26 Rec  8/22     Date 8/31 9/19 10/10 11/8 12/4 1/3 2/15 4/8 4/12 5/7 6/11    Total Weekly Dose 47.5 mg 47.5 mg  47.5 mg  47.5mg  47.5 mg 47.5 mg 35 mg 40 mg 47.5 mg 47.5 mg    INR 2.12 3.48 2.29 1.77 1.33 1.83 1.74 1.17 1.54 1.37 1.68    Notes Rec'd 9/1 Rec'd 9/20  [facility]    ED-- clinic unaware  Rec'd 2/15 Missed doses  Boost x 2 Rec'd 5/8 Rec'd 6/12      Phone Interview:  Tablet Strength: 2.5 mg and 5 mg tablets  Patient Contact Info:  [phone]  Lab Contact Info:  [facility]      Patient Findings:  Negatives: Signs/symptoms of thrombosis, Signs/symptoms of bleeding, Laboratory test error suspected, Change in health, Change in alcohol use, Change in activity, Upcoming invasive procedure, Emergency department visit, Upcoming dental procedure, Missed doses, Extra doses, Change in medications, Change in diet/appetite, Hospital admission, Bruising, Other complaints   Comments: All findings negative per Ms. Queen       Plan:     1. INR is therapeutic yesterday at 1.68 (goal 1.5-2.5) results received today 6/12. Instructed Ms. Queen to continue prior dose of warfarin 5 mg every Sun, Wed; and warfarin 7.5 mg all other days until recheck.   2. Repeat INR in 4 weeks ~7/9.  3. Verbal information provided over the phone. Deisi MIN Queen RBV dosing instructions, expresses understanding by teach back, and has no further questions at this time.    Deven Munguia, PharmD, BCPS  6/12/2024  09:21 EDT

## 2024-07-30 ENCOUNTER — TELEPHONE (OUTPATIENT)
Dept: PHARMACY | Facility: HOSPITAL | Age: 39
End: 2024-07-30

## 2024-08-01 ENCOUNTER — ANTICOAGULATION VISIT (OUTPATIENT)
Dept: PHARMACY | Facility: HOSPITAL | Age: 39
End: 2024-08-01
Payer: COMMERCIAL

## 2024-08-01 DIAGNOSIS — Z95.2 HX OF AORTIC VALVE REPLACEMENT, MECHANICAL: Primary | ICD-10-CM

## 2024-08-01 LAB — INR PPP: 2.23

## 2024-08-01 NOTE — PROGRESS NOTES
Anticoagulation Clinic - Remote Progress Note  REMOTE LAB    Indication: mechanical AVR (On-X)  Referring Provider: Latoya (Last seen: 11/21 next appt: 2/19/24)  Goal INR: 1.5-2.5  Current Drug Interactions: aspirin; melatonin    Diet: Brussel sprouts or equivalent 1x/week (9/20/23)  Alcohol: None  Tobacco: None  OTC Pain Medication: APAP PRN    INR History:  Date 11/21 12/19 1/24/20 2/20 3/13 4/30 5/28 6/19 7/16 9/3 11/4 11/27   Total WeeklyDose 40mg 40mg 40mg 40mg 40mg 40mg 40mg 40mg 40mg 40mg 40mg 40mg   INR 1.94 1.99 1.96 2.16 1.86 2.62 2.68 1.21 1.78 1.71 1.46 1.93   Notes  amox x7d     decr GLV? incr GLV   overdue overdue; rcvd 11/30     Date 1/14/21 2/19 4/7 5/13 5/28 6/9 7/14 8/30 9/13 9/20 10/1 10/4   Total WeeklyDose 40mg 40mg 40mg 40mg 40mg 40mg 40mg 40mg 40mg 42.5mg 37.5mg 40mg   INR 1.97 2.26 2.11 1.47 1.56 2.19 1.49 1.34 1.46 1.39 1.19 1.24   Notes overdue; rec'd 1/15          1x miss enox     Date 10/7 10/11 10/20 11/24 12/7 1/4/22  3/4 3/11 4/21  7/8   Total WeeklyDose 52.5mg 55mg 55mg 55mg non- compliant 52.5mg non- compliant 52.5mg 50mg 52.5mg  52.5mg   INR 1.59 1.57 1.91 2.87   1.69  2.95 2.05 1.52  2.69   Notes  Zero GLV  rcv'd 11/29  recv'd 1/5   1x decr dose   zoloft initiation       Date 7/19 8/10 9/20 12/1 2/3 3/15  4/18 5/4  7/25 8/21   Total WeeklyDose 50mg 50mg 47.5 mg 47.5 mg  47.5 mg overdue 47.5mg 47.5 mg  overdue 47.5 mg 47.5 mg   INR 2.18 2.66 2.08 2.59 2.45 3.52  1.45 2.25  2.4 3.32   Notes    Due 10/4 rec 2/6 zoloft increase  Miss?   Rec 7/26 Rec  8/22     Date 8/31 9/19 10/10 11/8 12/4 1/3 2/15 4/8 4/12 5/7 6/11 7/31/24   Total Weekly Dose 47.5 mg 47.5 mg  47.5 mg  47.5mg  47.5 mg 47.5 mg 35 mg 40 mg 47.5 mg 47.5 mg 47.5 mg   INR 2.12 3.48 2.29 1.77 1.33 1.83 1.74 1.17 1.54 1.37 1.68 2.23   Notes Rec'd 9/1 Rec'd 9/20  [facility]    ED-- clinic unaware  Rec'd 2/15 Missed doses  Boost x 2 Rec'd 5/8 Rec'd 6/12 Rec'd 8/1     Date               Total WeeklyDose               INR                Notes                 Phone Interview:  Tablet Strength: 2.5 mg and 5 mg tablets  Patient Contact Info: [phone]  Lab Contact Info: [facility]    Patient Findings  Positives: Change in medications   Negatives: Signs/symptoms of thrombosis, Signs/symptoms of bleeding, Laboratory test error suspected, Change in health, Change in alcohol use, Change in activity, Upcoming invasive procedure, Emergency department visit, Upcoming dental procedure, Missed doses, Extra doses, Change in diet/appetite, Hospital admission, Bruising, Other complaints   Comments: 7/29/24  Started Celexa 2 mg once a day ( potential DDI Antidepressants with Antiplatelet Effects may enhance the anticoagulant effect of Vitamin K Antagonists )    All other findings negative per patient     Plan:     1. INR was therapeutic yesterday at 2.23 (goal 1.5-2.5)  Instructed Ms. Queen to continue warfarin 7.5 mg oral daily except  5 mg on SunWed until recheck.   2. Repeat INR in 4 weeks 8/28/24  3. Verbal information provided over the phone. Deisi Queen RBV dosing instructions, expresses understanding by teach back, and has no further questions at this time.    Lalito Robles, Pharmacy Technician  8/1/2024  15:19 EDT    I, Alondra Gilmore, [facility], have reviewed the note in full and agree with the assessment and plan.  08/01/24  15:55 EDT

## 2024-10-30 ENCOUNTER — TELEPHONE (OUTPATIENT)
Dept: PHARMACY | Facility: HOSPITAL | Age: 39
End: 2024-10-30

## 2024-10-30 NOTE — TELEPHONE ENCOUNTER
Called patient informing her she is overdue for an INR check. LVM in patient's voicemail.    Amy Leonard CPhT   13:54 EDT 10/30/2024

## 2024-11-05 ENCOUNTER — ANTICOAGULATION VISIT (OUTPATIENT)
Dept: PHARMACY | Facility: HOSPITAL | Age: 39
End: 2024-11-05
Payer: COMMERCIAL

## 2024-11-05 DIAGNOSIS — Z95.2 HX OF AORTIC VALVE REPLACEMENT, MECHANICAL: Primary | ICD-10-CM

## 2024-11-05 LAB — INR PPP: 1.18

## 2024-11-05 RX ORDER — CITALOPRAM HYDROBROMIDE 20 MG/1
20 TABLET ORAL EVERY MORNING
COMMUNITY
Start: 2024-10-02

## 2024-11-05 NOTE — PROGRESS NOTES
Anticoagulation Clinic - Remote Progress Note  REMOTE LAB    Indication: mechanical AVR (On-X)  Referring Provider: Latoya (Last seen: 11/21 next appt: 2/19/24)  Goal INR: 1.5-2.5  Current Drug Interactions: aspirin; melatonin    Diet: Brussel sprouts or equivalent 1x/week (9/20/23)  Alcohol: None  Tobacco: None  OTC Pain Medication: APAP PRN    INR History:  Date 11/21 12/19 1/24/20 2/20 3/13 4/30 5/28 6/19 7/16 9/3 11/4 11/27   Total WeeklyDose 40mg 40mg 40mg 40mg 40mg 40mg 40mg 40mg 40mg 40mg 40mg 40mg   INR 1.94 1.99 1.96 2.16 1.86 2.62 2.68 1.21 1.78 1.71 1.46 1.93   Notes  amox x7d     decr GLV? incr GLV   overdue overdue; rcvd 11/30     Date 1/14/21 2/19 4/7 5/13 5/28 6/9 7/14 8/30 9/13 9/20 10/1 10/4   Total WeeklyDose 40mg 40mg 40mg 40mg 40mg 40mg 40mg 40mg 40mg 42.5mg 37.5mg 40mg   INR 1.97 2.26 2.11 1.47 1.56 2.19 1.49 1.34 1.46 1.39 1.19 1.24   Notes overdue; rec'd 1/15          1x miss enox     Date 10/7 10/11 10/20 11/24 12/7 1/4/22  3/4 3/11 4/21  7/8   Total WeeklyDose 52.5mg 55mg 55mg 55mg non- compliant 52.5mg non- compliant 52.5mg 50mg 52.5mg  52.5mg   INR 1.59 1.57 1.91 2.87   1.69  2.95 2.05 1.52  2.69   Notes  Zero GLV  rcv'd 11/29  recv'd 1/5   1x decr dose   zoloft initiation       Date 7/19 8/10 9/20 12/1 2/3 3/15  4/18 5/4  7/25 8/21   Total WeeklyDose 50mg 50mg 47.5 mg 47.5 mg  47.5 mg overdue 47.5mg 47.5 mg  overdue 47.5 mg 47.5 mg   INR 2.18 2.66 2.08 2.59 2.45 3.52  1.45 2.25  2.4 3.32   Notes    Due 10/4 rec 2/6 zoloft increase  Miss?   Rec 7/26 Rec  8/22     Date 8/31 9/19 10/10 11/8 12/4 1/3 2/15 4/8 4/12 5/7 6/11 7/31/24   Total Weekly Dose 47.5 mg 47.5 mg  47.5 mg  47.5mg  47.5 mg 47.5 mg 35 mg 40 mg 47.5 mg 47.5 mg 47.5 mg   INR 2.12 3.48 2.29 1.77 1.33 1.83 1.74 1.17 1.54 1.37 1.68 2.23   Notes Rec'd 9/1 Rec'd 9/20  [facility]    ED-- clinic unaware  Rec'd 2/15 Missed doses  Boost x 2 Rec'd 5/8 Rec'd 6/12 Rec'd 8/1     Date 11/5              Total WeeklyDose 32.5mg               INR  1.18              Notes                 Phone Interview:  Tablet Strength: 2.5 mg and 5 mg tablets  Patient Contact Info: [phone]  Lab Contact Info: [facility]      Patient Findings:  Positives: Missed doses   Negatives: Signs/symptoms of thrombosis, Signs/symptoms of bleeding, Laboratory test error suspected, Change in health, Change in alcohol use, Change in activity, Upcoming invasive procedure, Emergency department visit, Upcoming dental procedure, Extra doses, Change in medications, Change in diet/appetite, Hospital admission, Bruising, Other complaints   Comments: Patient reports missing 2 doses last week, states she normally tries taking warfarin as recommended at last visit.  Denies all other findings.       Plan:     1. INR was subtherapeutic yesterday at 1.18 (goal 1.5-2.5)  Instructed Ms. Queen to increase today's dose to 10mg, then continue warfarin 7.5mg oral daily except  5mg on SunWed until recheck. Patient missed 2 doses last week which explains subtherapeutic INR, do not want to be too aggressive with boosted dose as patient was mostly in range on regimen previously. Patient had not checked INR in 3 mos but denies all findings, no s/s of bleeding or clotting.  2. Repeat INR in 1 week 11/12/24  3. Verbal information provided over the phone. Deisi Queen RBV dosing instructions, expresses understanding by teach back, and has no further questions at this time.    Aiyana El, PharmD  11/5/2024  08:11 EST

## 2024-11-15 ENCOUNTER — ANTICOAGULATION VISIT (OUTPATIENT)
Dept: PHARMACY | Facility: HOSPITAL | Age: 39
End: 2024-11-15
Payer: COMMERCIAL

## 2024-11-15 DIAGNOSIS — Z95.2 HX OF AORTIC VALVE REPLACEMENT, MECHANICAL: Primary | ICD-10-CM

## 2024-11-15 LAB — INR PPP: 1.25

## 2024-11-20 RX ORDER — WARFARIN SODIUM 2.5 MG/1
TABLET ORAL
Qty: 30 TABLET | Refills: 1 | Status: SHIPPED | OUTPATIENT
Start: 2024-11-20

## 2024-11-20 RX ORDER — WARFARIN SODIUM 5 MG/1
TABLET ORAL
Qty: 30 TABLET | Refills: 1 | Status: SHIPPED | OUTPATIENT
Start: 2024-11-20 | End: 2024-11-21 | Stop reason: SDUPTHER

## 2024-11-21 RX ORDER — WARFARIN SODIUM 5 MG/1
TABLET ORAL
Qty: 30 TABLET | Refills: 1 | Status: SHIPPED | OUTPATIENT
Start: 2024-11-21

## 2025-01-22 LAB — INR PPP: 1.6

## 2025-01-31 RX ORDER — WARFARIN SODIUM 2.5 MG/1
TABLET ORAL
Qty: 14 TABLET | Refills: 1 | Status: SHIPPED | OUTPATIENT
Start: 2025-01-31

## 2025-01-31 NOTE — TELEPHONE ENCOUNTER
Patient overdue for INR. 2 week supply sent to pharmacy until patient can recheck and assess proper dosing.     Kenna Douglass, PharmD  1/31/2025  15:24 EST

## 2025-02-10 ENCOUNTER — ANTICOAGULATION VISIT (OUTPATIENT)
Dept: PHARMACY | Facility: HOSPITAL | Age: 40
End: 2025-02-10
Payer: COMMERCIAL

## 2025-02-10 DIAGNOSIS — Z95.2 HX OF AORTIC VALVE REPLACEMENT, MECHANICAL: Primary | ICD-10-CM

## 2025-02-10 RX ORDER — WARFARIN SODIUM 5 MG/1
TABLET ORAL
Qty: 30 TABLET | Refills: 2 | Status: SHIPPED | OUTPATIENT
Start: 2025-02-10

## 2025-02-10 RX ORDER — WARFARIN SODIUM 2.5 MG/1
TABLET ORAL
Qty: 30 TABLET | Refills: 2 | Status: SHIPPED | OUTPATIENT
Start: 2025-02-10

## 2025-02-10 NOTE — PROGRESS NOTES
Anticoagulation Clinic - Remote Progress Note  REMOTE LAB    Indication: mechanical AVR (On-X)  Referring Provider: Latoya (Last seen: 11/21 next appt: 2/19/24)  Goal INR: 1.5-2.5  Current Drug Interactions: aspirin; melatonin    Diet: Brussel sprouts or equivalent 1x/week (9/20/23)  Alcohol: None  Tobacco: None  OTC Pain Medication: APAP PRN    INR History:  Date 11/21 12/19 1/24/20 2/20 3/13 4/30 5/28 6/19 7/16 9/3 11/4 11/27   Total WeeklyDose 40mg 40mg 40mg 40mg 40mg 40mg 40mg 40mg 40mg 40mg 40mg 40mg   INR 1.94 1.99 1.96 2.16 1.86 2.62 2.68 1.21 1.78 1.71 1.46 1.93   Notes  amox x7d     decr GLV? incr GLV   overdue overdue; rcvd 11/30     Date 1/14/21 2/19 4/7 5/13 5/28 6/9 7/14 8/30 9/13 9/20 10/1 10/4   Total WeeklyDose 40mg 40mg 40mg 40mg 40mg 40mg 40mg 40mg 40mg 42.5mg 37.5mg 40mg   INR 1.97 2.26 2.11 1.47 1.56 2.19 1.49 1.34 1.46 1.39 1.19 1.24   Notes overdue; rec'd 1/15          1x miss enox     Date 10/7 10/11 10/20 11/24 12/7 1/4/22  3/4 3/11 4/21  7/8   Total WeeklyDose 52.5mg 55mg 55mg 55mg non- compliant 52.5mg non- compliant 52.5mg 50mg 52.5mg  52.5mg   INR 1.59 1.57 1.91 2.87   1.69  2.95 2.05 1.52  2.69   Notes  Zero GLV  rcv'd 11/29  recv'd 1/5   1x decr dose   zoloft initiation       Date 7/19 8/10 9/20 12/1 2/3 3/15  4/18 5/4  7/25 8/21   Total WeeklyDose 50mg 50mg 47.5 mg 47.5 mg  47.5 mg overdue 47.5mg 47.5 mg  overdue 47.5 mg 47.5 mg   INR 2.18 2.66 2.08 2.59 2.45 3.52  1.45 2.25  2.4 3.32   Notes    Due 10/4 rec 2/6 zoloft increase  Miss?   Rec 7/26 Rec  8/22     Date 8/31 9/19 10/10 11/8 12/4 1/3 2/15 4/8 4/12 5/7 6/11 7/31/24   Total Weekly Dose 47.5 mg 47.5 mg  47.5 mg  47.5mg  47.5 mg 47.5 mg 35 mg 40 mg 47.5 mg 47.5 mg 47.5 mg   INR 2.12 3.48 2.29 1.77 1.33 1.83 1.74 1.17 1.54 1.37 1.68 2.23   Notes Rec'd 9/1 Rec'd 9/20  [facility]    ED-- clinic unaware  Rec'd 2/15 Missed doses  Boost x 2 Rec'd 5/8 Rec'd 6/12 Rec'd 8/1     Date 11/5 11/15 1/22            Total WeeklyDose 32.5mg  47.5mg  47.5 mg             INR 1.18 1.25 1.6            Notes   Rec 2/10              Phone Interview:  Tablet Strength: 2.5 mg and 5 mg tablets  Patient Contact Info: [phone]  Lab Contact Info: [facility]      Patient Findings:  Negatives: Signs/symptoms of thrombosis, Signs/symptoms of bleeding, Laboratory test error suspected, Change in health, Change in alcohol use, Change in activity, Upcoming invasive procedure, Emergency department visit, Upcoming dental procedure, Missed doses, Extra doses, Change in medications, Change in diet/appetite, Hospital admission, Bruising, Other complaints   Comments: All findings negative per patient.         Plan:     1. INR was therapeutic on 1/22 at 1.6 (goal 1.5-2.5)  Instructed Ms. Queen to continue warfarin 7.5mg oral daily except  5mg on Wed only until recheck. Patient called in for warfarin refill today, asked patient to have INR checked since we had not received INR since 11/15, and a 14-day Rx was sent to patients pharmacy 2 weeks ago pending updated INR. Called [facility] who confirmed patient has had INR checked twice since last documented INR, they will fax to us now for charting purposes. Will send in new Rx today. Also asked patient to please let us know if she does not hear from us after INR checks, as we have not received labs for her in 2 months.   2. Repeat INR on 2/19/25  3. Verbal information provided over the phone. Deisi Queen RBV dosing instructions, expresses understanding by teach back, and has no further questions at this time.    Aiyana El, PharmD  2/10/2025  14:04 EST

## 2025-03-26 LAB — INR PPP: 2.22

## 2025-03-27 ENCOUNTER — ANTICOAGULATION VISIT (OUTPATIENT)
Dept: PHARMACY | Facility: HOSPITAL | Age: 40
End: 2025-03-27
Payer: COMMERCIAL

## 2025-03-27 DIAGNOSIS — Z95.2 HX OF AORTIC VALVE REPLACEMENT, MECHANICAL: Primary | ICD-10-CM

## 2025-03-27 NOTE — PROGRESS NOTES
Anticoagulation Clinic - Remote Progress Note  REMOTE LAB    Indication: mechanical AVR (On-X)  Referring Provider: Latoya (Last seen: 11/21 next appt: 2/19/24)  Goal INR: 1.5-2.5  Current Drug Interactions: aspirin; melatonin    Diet: Brussel sprouts or equivalent 1x/week (9/20/23)  Alcohol: None  Tobacco: None  OTC Pain Medication: APAP PRN    INR History:  Date 11/21 12/19 1/24/20 2/20 3/13 4/30 5/28 6/19 7/16 9/3 11/4 11/27   Total WeeklyDose 40mg 40mg 40mg 40mg 40mg 40mg 40mg 40mg 40mg 40mg 40mg 40mg   INR 1.94 1.99 1.96 2.16 1.86 2.62 2.68 1.21 1.78 1.71 1.46 1.93   Notes  amox x7d     decr GLV? incr GLV   overdue overdue; rcvd 11/30     Date 1/14/21 2/19 4/7 5/13 5/28 6/9 7/14 8/30 9/13 9/20 10/1 10/4   Total WeeklyDose 40mg 40mg 40mg 40mg 40mg 40mg 40mg 40mg 40mg 42.5mg 37.5mg 40mg   INR 1.97 2.26 2.11 1.47 1.56 2.19 1.49 1.34 1.46 1.39 1.19 1.24   Notes overdue; rec'd 1/15          1x miss enox     Date 10/7 10/11 10/20 11/24 12/7 1/4/22  3/4 3/11 4/21  7/8   Total WeeklyDose 52.5mg 55mg 55mg 55mg non- compliant 52.5mg non- compliant 52.5mg 50mg 52.5mg  52.5mg   INR 1.59 1.57 1.91 2.87   1.69  2.95 2.05 1.52  2.69   Notes  Zero GLV  rcv'd 11/29  recv'd 1/5   1x decr dose   zoloft initiation       Date 7/19 8/10 9/20 12/1 2/3 3/15  4/18 5/4  7/25 8/21   Total WeeklyDose 50mg 50mg 47.5 mg 47.5 mg  47.5 mg overdue 47.5mg 47.5 mg  overdue 47.5 mg 47.5 mg   INR 2.18 2.66 2.08 2.59 2.45 3.52  1.45 2.25  2.4 3.32   Notes    Due 10/4 rec 2/6 zoloft increase  Miss?   Rec 7/26 Rec  8/22     Date 8/31 9/19 10/10 11/8 12/4 1/3 2/15 4/8 4/12 5/7 6/11 7/31/24   Total Weekly Dose 47.5 mg 47.5 mg  47.5 mg  47.5mg  47.5 mg 47.5 mg 35 mg 40 mg 47.5 mg 47.5 mg 47.5 mg   INR 2.12 3.48 2.29 1.77 1.33 1.83 1.74 1.17 1.54 1.37 1.68 2.23   Notes Rec'd 9/1 Rec'd 9/20  [facility]    ED-- clinic unaware  Rec'd 2/15 Missed doses  Boost x 2 Rec'd 5/8 Rec'd 6/12 Rec'd 8/1     Date 11/5 11/15 1/22 3/26           Total WeeklyDose 32.5mg   47.5mg 47.5 mg  47.5 mg           INR 1.18 1.25 1.6 2.22           Notes   Rec 2/10 Rec'd 3/27             Phone Interview:  Tablet Strength: 2.5 mg and 5 mg tablets  Patient Contact Info: [phone]  Lab Contact Info: [facility]    Patient Findings    Negatives: Signs/symptoms of thrombosis, Signs/symptoms of bleeding, Laboratory test error suspected, Change in health, Change in alcohol use, Change in activity, Upcoming invasive procedure, Emergency department visit, Upcoming dental procedure, Missed doses, Extra doses, Change in medications, Change in diet/appetite, Hospital admission, Bruising, Other complaints   Comments: Patient states she went to the lab last month.Per [facility] the last INR was 1/22. All other findings negative per patient.       Plan:     1. INR was therapeutic at 2.22 (goal 1.5-2.5)  Instructed Ms. Queen to continue warfarin 7.5mg oral daily except 5mg on Wed/Sun until recheck.   2. Repeat INR in four weeks on 4/23/25  3. Verbal information provided over the phone. Deisi Queen RBV dosing instructions, expresses understanding by teach back, and has no further questions at this time.    Kranthi Keith   [facility]  3/27/2025 09:41 EDT    I, Kenna Douglass, PharmD, have reviewed the note in full and agree with the assessment and plan.  03/27/25  11:45 EDT

## 2025-04-28 ENCOUNTER — ANTICOAGULATION VISIT (OUTPATIENT)
Dept: PHARMACY | Facility: HOSPITAL | Age: 40
End: 2025-04-28
Payer: COMMERCIAL

## 2025-04-28 DIAGNOSIS — Z95.2 HX OF AORTIC VALVE REPLACEMENT, MECHANICAL: Primary | ICD-10-CM

## 2025-04-28 LAB — INR PPP: 2.02

## 2025-04-28 NOTE — PROGRESS NOTES
Anticoagulation Clinic - Remote Progress Note  REMOTE LAB    Indication: mechanical AVR (On-X)  Referring Provider: Latoya (Last seen: 11/21 next appt: 2/19/24)  Goal INR: 1.5-2.5  Current Drug Interactions: aspirin; melatonin    Diet: Brussel sprouts or equivalent 1x/week (9/20/23)  Alcohol: None  Tobacco: None  OTC Pain Medication: APAP PRN    INR History:  Date 11/21 12/19 1/24/20 2/20 3/13 4/30 5/28 6/19 7/16 9/3 11/4 11/27   Total WeeklyDose 40mg 40mg 40mg 40mg 40mg 40mg 40mg 40mg 40mg 40mg 40mg 40mg   INR 1.94 1.99 1.96 2.16 1.86 2.62 2.68 1.21 1.78 1.71 1.46 1.93   Notes  amox x7d     decr GLV? incr GLV   overdue overdue; rcvd 11/30     Date 1/14/21 2/19 4/7 5/13 5/28 6/9 7/14 8/30 9/13 9/20 10/1 10/4   Total WeeklyDose 40mg 40mg 40mg 40mg 40mg 40mg 40mg 40mg 40mg 42.5mg 37.5mg 40mg   INR 1.97 2.26 2.11 1.47 1.56 2.19 1.49 1.34 1.46 1.39 1.19 1.24   Notes overdue; rec'd 1/15          1x miss enox     Date 10/7 10/11 10/20 11/24 12/7 1/4/22  3/4 3/11 4/21  7/8   Total WeeklyDose 52.5mg 55mg 55mg 55mg non- compliant 52.5mg non- compliant 52.5mg 50mg 52.5mg  52.5mg   INR 1.59 1.57 1.91 2.87   1.69  2.95 2.05 1.52  2.69   Notes  Zero GLV  rcv'd 11/29  recv'd 1/5   1x decr dose   zoloft initiation       Date 7/19 8/10 9/20 12/1 2/3 3/15  4/18 5/4  7/25 8/21   Total WeeklyDose 50mg 50mg 47.5 mg 47.5 mg  47.5 mg overdue 47.5mg 47.5 mg  overdue 47.5 mg 47.5 mg   INR 2.18 2.66 2.08 2.59 2.45 3.52  1.45 2.25  2.4 3.32   Notes    Due 10/4 rec 2/6 zoloft increase  Miss?   Rec 7/26 Rec  8/22     Date 8/31 9/19 10/10 11/8 12/4 1/3 2/15 4/8 4/12 5/7 6/11 7/31/24   Total Weekly Dose 47.5 mg 47.5 mg  47.5 mg  47.5mg  47.5 mg 47.5 mg 35 mg 40 mg 47.5 mg 47.5 mg 47.5 mg   INR 2.12 3.48 2.29 1.77 1.33 1.83 1.74 1.17 1.54 1.37 1.68 2.23   Notes Rec'd 9/1 Rec'd 9/20  [facility]    ED-- clinic unaware  Rec'd 2/15 Missed doses  Boost x 2 Rec'd 5/8 Rec'd 6/12 Rec'd 8/1     Date 11/5 11/15 1/22 3/26 4/28          Total WeeklyDose 32.5mg   47.5mg 47.5 mg  47.5 mg           INR 1.18 1.25 1.6 2.22 2.02          Notes   Rec 2/10 Rec'd 3/27             Phone Interview:  Tablet Strength: 2.5 mg and 5 mg tablets  Patient Contact Info: [phone]  Lab Contact Info:  [facility]    UNABLE TO GET IN CONTACT WITH THE PATIENT. PLEASE DISREGARD THE FOLLOWING PLAN UNTIL ABLE TO GET IN CONTACT WITH PATIENT/ PATIENT REPRESENTATIVE. LV 4.28.25, 4/30        Plan:     INR is therapeutic today at 2.02 (goal 1.5-2.5). Instructed Ms. Queen to continue warfarin 7.5 mg daily except 5 mg Wed/Sun until recheck.   Repeat INR 5.27.25  Verbal information provided over the phone. Deisi MIN Bret RBV dosing instructions, expresses understanding by teach back, and has no further questions at this time.

## 2025-05-15 ENCOUNTER — TRANSCRIBE ORDERS (OUTPATIENT)
Dept: ADMINISTRATIVE | Facility: HOSPITAL | Age: 40
End: 2025-05-15
Payer: COMMERCIAL

## 2025-05-15 DIAGNOSIS — Z12.31 VISIT FOR SCREENING MAMMOGRAM: Primary | ICD-10-CM

## 2025-05-21 ENCOUNTER — OFFICE VISIT (OUTPATIENT)
Dept: CARDIOLOGY | Facility: CLINIC | Age: 40
End: 2025-05-21
Payer: COMMERCIAL

## 2025-05-21 VITALS
HEIGHT: 62 IN | BODY MASS INDEX: 33.86 KG/M2 | DIASTOLIC BLOOD PRESSURE: 84 MMHG | HEART RATE: 78 BPM | WEIGHT: 184 LBS | OXYGEN SATURATION: 98 % | SYSTOLIC BLOOD PRESSURE: 126 MMHG

## 2025-05-21 DIAGNOSIS — Q23.81 BICUSPID AORTIC VALVE: Primary | ICD-10-CM

## 2025-05-21 PROCEDURE — 99213 OFFICE O/P EST LOW 20 MIN: CPT | Performed by: INTERNAL MEDICINE

## 2025-05-21 RX ORDER — TIRZEPATIDE 2.5 MG/.5ML
2.5 INJECTION, SOLUTION SUBCUTANEOUS WEEKLY
COMMUNITY
Start: 2025-04-24

## 2025-05-21 NOTE — PROGRESS NOTES
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    PROBLEM LIST:   Bicuspid aortic valve with combined aortic stenosis/aortic insufficiency.  In 2005, remote onset of auscultated murmur by primary care physician with ELVIRA per Dr. Ortiz with bicuspid findings.   9/2016 #25 AVR On X Dacron tube  graft & root replacement  w coronary reimplantation-Dr Bazzi  periop tamponade requiring window  11/17 echo acceptable AVR , EF 60%  11/2020 echo: EF 60%, concentric LVH, normal function mechanical AV  2/24 echo EF greater than 55 normal mechanical AV function  Atypical chest pain.  On 4/16/2012, GXT stress test revealing 12.8 MET exercise. T-wave inversion in inferior leads and in leads 4 - leads 6 and no exercise induced arrhythmias.  Palpitations.   On 4/17/2012, 24-hour Holter monitor revealing heart rate ranging from  beats per minute with 56 PVCs, no pauses.  HLD  11/21/2019 lipids:   HDL 36   10/21 164/162/48/88  Childhood asthma.  G2, P3      CC:   Chief Complaint   Patient presents with    Nonrheumatic aortic valve stenosis       Allergies  Allergies   Allergen Reactions    Flagyl [Metronidazole] Itching       Current Medications    Current Outpatient Medications:     citalopram (CeleXA) 20 MG tablet, Take 1 tablet by mouth Every Morning., Disp: , Rfl:     melatonin 5 MG tablet tablet, Take 1 tablet by mouth Daily., Disp: , Rfl:     Multiple Vitamins-Minerals (MULTI ADULT GUMMIES PO), Take  by mouth Daily., Disp: , Rfl:     ondansetron (ZOFRAN) 8 MG tablet, Take  by mouth Every 8 (Eight) Hours., Disp: , Rfl:     warfarin (COUMADIN) 2.5 MG tablet, TAKE ONE TABLET BY MOUTH EVERY DAY (OR AS DIRECTED BY ANTICOAGULATION CLINIC)., Disp: 30 tablet, Rfl: 2     "warfarin (COUMADIN) 5 MG tablet, TAKE 1 TABLET BY MOUTH EVERY DAY (OR AS DIRECTED BY ANTICOAGULATION CLINIC), Disp: 30 tablet, Rfl: 2    Zepbound 2.5 MG/0.5ML solution auto-injector, Inject 0.5 mL under the skin into the appropriate area as directed 1 (One) Time Per Week., Disp: , Rfl:       History of Present Illness   Deisi Queen is a 39 y.o. year old female here for follow up.    Notes some increased fatigue.  States she gets adequate rest.  She recently had serologies as per PCP that reportedly normal    OBJECTIVE:  Vitals:    05/21/25 1034   BP: 126/84   BP Location: Right arm   Patient Position: Sitting   Cuff Size: Adult   Pulse: 78   SpO2: 98%   Weight: 83.5 kg (184 lb)   Height: 157.5 cm (62\")     Body mass index is 33.65 kg/m².    Constitutional:       Appearance: Healthy appearance. Not in distress.   Neck:      Vascular: No JVR. JVD normal.   Pulmonary:      Effort: Pulmonary effort is normal.      Breath sounds: Normal breath sounds. No wheezing. No rhonchi. No rales.   Chest:      Chest wall: Not tender to palpatation.   Cardiovascular:      PMI at left midclavicular line. Normal rate. Regular rhythm. Normal S1. Normal S2.       Murmurs: There is no murmur.      No gallop.  Systolic ejection click. No rub.   Pulses:     Intact distal pulses.   Edema:     Peripheral edema absent.   Abdominal:      General: Bowel sounds are normal.      Palpations: Abdomen is soft.      Tenderness: There is no abdominal tenderness.   Musculoskeletal: Normal range of motion.         General: No tenderness. Skin:     General: Skin is warm and dry.   Neurological:      General: No focal deficit present.      Mental Status: Alert and oriented to person, place and time.         Diagnostic Data:  Procedures      ASSESSMENT:   Diagnosis Plan   1. Bicuspid aortic valve                PLAN:  Bicuspid aortic valve status post mechanical AVR acceptable function per echocardiogram asymptomatic continue yearly follow-up " echocardiogram  next year          Niels Klein MD, FACC

## 2025-05-23 ENCOUNTER — HOSPITAL ENCOUNTER (OUTPATIENT)
Dept: MAMMOGRAPHY | Facility: HOSPITAL | Age: 40
Discharge: HOME | End: 2025-05-23
Admitting: NURSE PRACTITIONER
Payer: COMMERCIAL

## 2025-05-23 DIAGNOSIS — Z12.31 VISIT FOR SCREENING MAMMOGRAM: ICD-10-CM

## 2025-05-23 PROCEDURE — 77067 SCR MAMMO BI INCL CAD: CPT

## 2025-05-23 PROCEDURE — 77063 BREAST TOMOSYNTHESIS BI: CPT

## 2025-06-11 LAB — INR PPP: 1.32

## 2025-06-12 ENCOUNTER — ANTICOAGULATION VISIT (OUTPATIENT)
Dept: PHARMACY | Facility: HOSPITAL | Age: 40
End: 2025-06-12
Payer: COMMERCIAL

## 2025-06-12 DIAGNOSIS — Z95.2 HX OF AORTIC VALVE REPLACEMENT, MECHANICAL: Primary | ICD-10-CM

## 2025-06-12 NOTE — PROGRESS NOTES
Anticoagulation Clinic - Remote Progress Note  REMOTE LAB     Indication: mechanical AVR (On-X)  Referring Provider: Latoya (Last seen: 11/21 next appt: 2/19/24)  Goal INR: 1.5-2.5  Current Drug Interactions: aspirin; melatonin     Diet: Brussel sprouts or equivalent 1x/week (9/20/23)  Alcohol: None  Tobacco: None  OTC Pain Medication: APAP PRN     INR History:  Date 11/21 12/19 1/24/20 2/20 3/13 4/30 5/28 6/19 7/16 9/3 11/4 11/27   Total WeeklyDose 40mg 40mg 40mg 40mg 40mg 40mg 40mg 40mg 40mg 40mg 40mg 40mg   INR 1.94 1.99 1.96 2.16 1.86 2.62 2.68 1.21 1.78 1.71 1.46 1.93   Notes   amox x7d         decr GLV? incr GLV     overdue overdue; rcvd 11/30      Date 1/14/21 2/19 4/7 5/13 5/28 6/9 7/14 8/30 9/13 9/20 10/1 10/4   Total WeeklyDose 40mg 40mg 40mg 40mg 40mg 40mg 40mg 40mg 40mg 42.5mg 37.5mg 40mg   INR 1.97 2.26 2.11 1.47 1.56 2.19 1.49 1.34 1.46 1.39 1.19 1.24   Notes overdue; rec'd 1/15                   1x miss enox      Date 10/7 10/11 10/20 11/24 12/7 1/4/22   3/4 3/11 4/21   7/8   Total WeeklyDose 52.5mg 55mg 55mg 55mg non- compliant 52.5mg non- compliant 52.5mg 50mg 52.5mg   52.5mg   INR 1.59 1.57 1.91 2.87   1.69   2.95 2.05 1.52   2.69   Notes   Zero GLV   rcv'd 11/29   recv'd 1/5     1x decr dose     zoloft initiation         Date 7/19 8/10 9/20 12/1 2/3 3/15   4/18 5/4   7/25 8/21   Total WeeklyDose 50mg 50mg 47.5 mg 47.5 mg   47.5 mg overdue 47.5mg 47.5 mg  overdue 47.5 mg 47.5 mg   INR 2.18 2.66 2.08 2.59 2.45 3.52   1.45 2.25   2.4 3.32   Notes       Due 10/4 rec 2/6 zoloft increase   Miss?     Rec 7/26 Rec  8/22      Date 8/31 9/19 10/10 11/8 12/4 1/3 2/15 4/8 4/12 5/7 6/11 7/31/24   Total Weekly Dose 47.5 mg 47.5 mg  47.5 mg  47.5mg   47.5 mg 47.5 mg 35 mg 40 mg 47.5 mg 47.5 mg 47.5 mg   INR 2.12 3.48 2.29 1.77 1.33 1.83 1.74 1.17 1.54 1.37 1.68 2.23   Notes Rec'd 9/1 Rec'd 9/20  [facility]      ED-- clinic unaware   Rec'd 2/15 Missed doses  Boost x 2 Rec'd 5/8 Rec'd 6/12 Rec'd 8/1      Date 11/5  11/15 1/22 3/26 4/28  6/11               Total WeeklyDose 32.5mg  47.5mg 47.5 mg  47.5 mg  47.5 mg  47.5 mg                 INR 1.18 1.25 1.6 2.22 2.02  1.32               Notes     Rec 2/10 Rec'd 3/27                      Phone Interview:  Tablet Strength: 2.5 mg and 5 mg tablets  Patient Contact Info: [phone]  Lab Contact Info: [facility]      Patient Findings:  Positives: Change in medications, Change in diet/appetite   Negatives: Signs/symptoms of thrombosis, Signs/symptoms of bleeding, Laboratory test error suspected, Change in health, Change in alcohol use, Change in activity, Upcoming invasive procedure, Emergency department visit, Upcoming dental procedure, Missed doses, Extra doses, Hospital admission, Bruising, Other complaints   Comments: Zepbound dose got increased, appetite has been less, has lost ~10 lb. All other findings negative.        Plan:     INR is subtherapeutic today at 1.32 (goal 1.5-2.5). Instructed Ms. Queen to take warfarin 7.5 mg this Sunday, then continue warfarin 7.5 mg daily except 5 mg Wed/Sun until recheck. Patient has lost weight with GLP1 therapy and has had reduced appetite, but both of these things typically increase INR. Will boost dose x 1 until recheck.   Repeat INR 7.10.25  Verbal information provided over the phone. Deisi Queen RBV dosing instructions, expresses understanding by teach back, and has no further questions at this time.      Aiyana El, PharmD   6/12/2025  09:30 EDT

## 2025-06-30 ENCOUNTER — HOSPITAL ENCOUNTER (OUTPATIENT)
Dept: MAMMOGRAPHY | Facility: HOSPITAL | Age: 40
Discharge: HOME | End: 2025-06-30
Payer: COMMERCIAL

## 2025-06-30 ENCOUNTER — HOSPITAL ENCOUNTER (OUTPATIENT)
Dept: ULTRASOUND IMAGING | Facility: HOSPITAL | Age: 40
Discharge: HOME | End: 2025-06-30
Payer: COMMERCIAL

## 2025-06-30 DIAGNOSIS — R92.8 ABNORMAL MAMMOGRAM: ICD-10-CM

## 2025-06-30 PROCEDURE — 76642 ULTRASOUND BREAST LIMITED: CPT

## 2025-06-30 PROCEDURE — 77065 DX MAMMO INCL CAD UNI: CPT

## 2025-06-30 PROCEDURE — G0279 TOMOSYNTHESIS, MAMMO: HCPCS

## 2025-07-30 ENCOUNTER — TELEPHONE (OUTPATIENT)
Dept: PHARMACY | Facility: HOSPITAL | Age: 40
End: 2025-07-30

## 2025-08-16 ENCOUNTER — APPOINTMENT (OUTPATIENT)
Dept: CT IMAGING | Facility: HOSPITAL | Age: 40
End: 2025-08-16
Payer: COMMERCIAL

## 2025-08-16 ENCOUNTER — APPOINTMENT (OUTPATIENT)
Dept: GENERAL RADIOLOGY | Facility: HOSPITAL | Age: 40
End: 2025-08-16
Payer: COMMERCIAL

## 2025-08-16 ENCOUNTER — HOSPITAL ENCOUNTER (EMERGENCY)
Facility: HOSPITAL | Age: 40
Discharge: HOME | End: 2025-08-16
Payer: COMMERCIAL

## 2025-08-16 VITALS
OXYGEN SATURATION: 99 % | BODY MASS INDEX: 33.86 KG/M2 | DIASTOLIC BLOOD PRESSURE: 85 MMHG | RESPIRATION RATE: 18 BRPM | SYSTOLIC BLOOD PRESSURE: 131 MMHG | HEIGHT: 62 IN | TEMPERATURE: 98.3 F | WEIGHT: 184 LBS | HEART RATE: 57 BPM

## 2025-08-16 DIAGNOSIS — R07.89 OTHER CHEST PAIN: Primary | ICD-10-CM

## 2025-08-16 LAB
ALBUMIN SERPL-MCNC: 4.3 G/DL (ref 3.5–5.2)
ALBUMIN/GLOB SERPL: 1.5 G/DL
ALP SERPL-CCNC: 94 U/L (ref 39–117)
ALT SERPL W P-5'-P-CCNC: 10 U/L (ref 1–33)
ANION GAP SERPL CALCULATED.3IONS-SCNC: 12.1 MMOL/L (ref 5–15)
AST SERPL-CCNC: 27 U/L (ref 1–32)
BASOPHILS # BLD AUTO: 0.04 10*3/MM3 (ref 0–0.2)
BASOPHILS NFR BLD AUTO: 0.5 % (ref 0–1.5)
BILIRUB SERPL-MCNC: 0.4 MG/DL (ref 0–1.2)
BUN SERPL-MCNC: 8.9 MG/DL (ref 6–20)
BUN/CREAT SERPL: 10 (ref 7–25)
CALCIUM SPEC-SCNC: 8.8 MG/DL (ref 8.6–10.5)
CHLORIDE SERPL-SCNC: 103 MMOL/L (ref 98–107)
CO2 SERPL-SCNC: 21.9 MMOL/L (ref 22–29)
CREAT SERPL-MCNC: 0.89 MG/DL (ref 0.57–1)
DEPRECATED RDW RBC AUTO: 45.3 FL (ref 37–54)
EGFRCR SERPLBLD CKD-EPI 2021: 83.7 ML/MIN/1.73
EOSINOPHIL # BLD AUTO: 0.1 10*3/MM3 (ref 0–0.4)
EOSINOPHIL NFR BLD AUTO: 1.2 % (ref 0.3–6.2)
ERYTHROCYTE [DISTWIDTH] IN BLOOD BY AUTOMATED COUNT: 15.1 % (ref 12.3–15.4)
GEN 5 1HR TROPONIN T REFLEX: 6 NG/L
GLOBULIN UR ELPH-MCNC: 2.9 GM/DL
GLUCOSE SERPL-MCNC: 97 MG/DL (ref 65–99)
HCT VFR BLD AUTO: 40.8 % (ref 34–46.6)
HGB BLD-MCNC: 12.9 G/DL (ref 12–15.9)
HOLD SPECIMEN: NORMAL
HOLD SPECIMEN: NORMAL
IMM GRANULOCYTES # BLD AUTO: 0.02 10*3/MM3 (ref 0–0.05)
IMM GRANULOCYTES NFR BLD AUTO: 0.2 % (ref 0–0.5)
LIPASE SERPL-CCNC: 39 U/L (ref 13–60)
LYMPHOCYTES # BLD AUTO: 1.41 10*3/MM3 (ref 0.7–3.1)
LYMPHOCYTES NFR BLD AUTO: 16.9 % (ref 19.6–45.3)
MCH RBC QN AUTO: 26.3 PG (ref 26.6–33)
MCHC RBC AUTO-ENTMCNC: 31.6 G/DL (ref 31.5–35.7)
MCV RBC AUTO: 83.1 FL (ref 79–97)
MONOCYTES # BLD AUTO: 0.34 10*3/MM3 (ref 0.1–0.9)
MONOCYTES NFR BLD AUTO: 4.1 % (ref 5–12)
NEUTROPHILS NFR BLD AUTO: 6.43 10*3/MM3 (ref 1.7–7)
NEUTROPHILS NFR BLD AUTO: 77.1 % (ref 42.7–76)
NRBC BLD AUTO-RTO: 0 /100 WBC (ref 0–0.2)
PLATELET # BLD AUTO: 203 10*3/MM3 (ref 140–450)
PMV BLD AUTO: 9.7 FL (ref 6–12)
POTASSIUM SERPL-SCNC: 4.3 MMOL/L (ref 3.5–5.2)
PROT SERPL-MCNC: 7.2 G/DL (ref 6–8.5)
QT INTERVAL: 378 MS
QTC INTERVAL: 427 MS
RBC # BLD AUTO: 4.91 10*6/MM3 (ref 3.77–5.28)
SODIUM SERPL-SCNC: 137 MMOL/L (ref 136–145)
TROPONIN T NUMERIC DELTA: NORMAL
TROPONIN T SERPL HS-MCNC: <6 NG/L
WBC NRBC COR # BLD AUTO: 8.34 10*3/MM3 (ref 3.4–10.8)
WHOLE BLOOD HOLD COAG: NORMAL
WHOLE BLOOD HOLD SPECIMEN: NORMAL

## 2025-08-16 PROCEDURE — 99285 EMERGENCY DEPT VISIT HI MDM: CPT

## 2025-08-16 PROCEDURE — 71275 CT ANGIOGRAPHY CHEST: CPT | Performed by: RADIOLOGY

## 2025-08-16 PROCEDURE — 84484 ASSAY OF TROPONIN QUANT: CPT

## 2025-08-16 PROCEDURE — 36415 COLL VENOUS BLD VENIPUNCTURE: CPT

## 2025-08-16 PROCEDURE — 71045 X-RAY EXAM CHEST 1 VIEW: CPT

## 2025-08-16 PROCEDURE — 80053 COMPREHEN METABOLIC PANEL: CPT

## 2025-08-16 PROCEDURE — 83690 ASSAY OF LIPASE: CPT

## 2025-08-16 PROCEDURE — 93005 ELECTROCARDIOGRAM TRACING: CPT

## 2025-08-16 PROCEDURE — 71275 CT ANGIOGRAPHY CHEST: CPT

## 2025-08-16 PROCEDURE — 85025 COMPLETE CBC W/AUTO DIFF WBC: CPT

## 2025-08-16 PROCEDURE — 71045 X-RAY EXAM CHEST 1 VIEW: CPT | Performed by: RADIOLOGY

## 2025-08-16 PROCEDURE — 25510000001 IOPAMIDOL PER 1 ML

## 2025-08-16 RX ORDER — SODIUM CHLORIDE 0.9 % (FLUSH) 0.9 %
10 SYRINGE (ML) INJECTION AS NEEDED
Status: DISCONTINUED | OUTPATIENT
Start: 2025-08-16 | End: 2025-08-16 | Stop reason: HOSPADM

## 2025-08-16 RX ORDER — WARFARIN SODIUM 2.5 MG/1
TABLET ORAL
Qty: 30 TABLET | Refills: 0 | Status: SHIPPED | OUTPATIENT
Start: 2025-08-16

## 2025-08-16 RX ORDER — ASPIRIN 81 MG/1
324 TABLET, CHEWABLE ORAL ONCE
Status: COMPLETED | OUTPATIENT
Start: 2025-08-16 | End: 2025-08-16

## 2025-08-16 RX ORDER — IOPAMIDOL 755 MG/ML
100 INJECTION, SOLUTION INTRAVASCULAR
Status: COMPLETED | OUTPATIENT
Start: 2025-08-16 | End: 2025-08-16

## 2025-08-16 RX ORDER — PANTOPRAZOLE SODIUM 40 MG/1
40 TABLET, DELAYED RELEASE ORAL ONCE
Status: COMPLETED | OUTPATIENT
Start: 2025-08-16 | End: 2025-08-16

## 2025-08-16 RX ORDER — PANTOPRAZOLE SODIUM 40 MG/1
40 TABLET, DELAYED RELEASE ORAL DAILY
Qty: 30 TABLET | Refills: 0 | Status: SHIPPED | OUTPATIENT
Start: 2025-08-16 | End: 2025-09-15

## 2025-08-16 RX ADMIN — IOPAMIDOL 85 ML: 755 INJECTION, SOLUTION INTRAVENOUS at 17:34

## 2025-08-16 RX ADMIN — ASPIRIN 81 MG CHEWABLE TABLET 324 MG: 81 TABLET CHEWABLE at 12:11

## 2025-08-16 RX ADMIN — PANTOPRAZOLE SODIUM 40 MG: 40 TABLET, DELAYED RELEASE ORAL at 18:16

## 2025-08-18 ENCOUNTER — TELEPHONE (OUTPATIENT)
Dept: CARDIOLOGY | Facility: CLINIC | Age: 40
End: 2025-08-18
Payer: COMMERCIAL

## 2025-08-18 RX ORDER — WARFARIN SODIUM 5 MG/1
5 TABLET ORAL DAILY
Qty: 30 TABLET | Refills: 6 | Status: SHIPPED | OUTPATIENT
Start: 2025-08-18

## 2025-08-22 ENCOUNTER — TRANSCRIBE ORDERS (OUTPATIENT)
Dept: ADMINISTRATIVE | Facility: HOSPITAL | Age: 40
End: 2025-08-22
Payer: COMMERCIAL

## 2025-08-22 DIAGNOSIS — R10.9 ABDOMINAL PAIN, UNSPECIFIED ABDOMINAL LOCATION: Primary | ICD-10-CM
